# Patient Record
Sex: FEMALE | Race: WHITE | NOT HISPANIC OR LATINO | Employment: UNEMPLOYED | URBAN - METROPOLITAN AREA
[De-identification: names, ages, dates, MRNs, and addresses within clinical notes are randomized per-mention and may not be internally consistent; named-entity substitution may affect disease eponyms.]

---

## 2017-03-03 ENCOUNTER — HOSPITAL ENCOUNTER (EMERGENCY)
Facility: MEDICAL CENTER | Age: 32
End: 2017-03-03
Attending: EMERGENCY MEDICINE
Payer: MEDICAID

## 2017-03-03 VITALS
SYSTOLIC BLOOD PRESSURE: 124 MMHG | DIASTOLIC BLOOD PRESSURE: 80 MMHG | RESPIRATION RATE: 18 BRPM | HEIGHT: 65 IN | BODY MASS INDEX: 38.86 KG/M2 | HEART RATE: 74 BPM | TEMPERATURE: 97.2 F | WEIGHT: 233.25 LBS | OXYGEN SATURATION: 94 %

## 2017-03-03 DIAGNOSIS — F41.1 ANXIETY REACTION: ICD-10-CM

## 2017-03-03 DIAGNOSIS — R20.0 FACIAL NUMBNESS: ICD-10-CM

## 2017-03-03 LAB
ANION GAP SERPL CALC-SCNC: 7 MMOL/L (ref 0–11.9)
BASOPHILS # BLD AUTO: 0.7 % (ref 0–1.8)
BASOPHILS # BLD: 0.05 K/UL (ref 0–0.12)
BUN SERPL-MCNC: 10 MG/DL (ref 8–22)
CALCIUM SERPL-MCNC: 9 MG/DL (ref 8.5–10.5)
CHLORIDE SERPL-SCNC: 103 MMOL/L (ref 96–112)
CO2 SERPL-SCNC: 27 MMOL/L (ref 20–33)
CREAT SERPL-MCNC: 0.82 MG/DL (ref 0.5–1.4)
EOSINOPHIL # BLD AUTO: 0.11 K/UL (ref 0–0.51)
EOSINOPHIL NFR BLD: 1.5 % (ref 0–6.9)
ERYTHROCYTE [DISTWIDTH] IN BLOOD BY AUTOMATED COUNT: 43.1 FL (ref 35.9–50)
GFR SERPL CREATININE-BSD FRML MDRD: >60 ML/MIN/1.73 M 2
GLUCOSE SERPL-MCNC: 97 MG/DL (ref 65–99)
HCT VFR BLD AUTO: 41.9 % (ref 37–47)
HGB BLD-MCNC: 13.8 G/DL (ref 12–16)
IMM GRANULOCYTES # BLD AUTO: 0.01 K/UL (ref 0–0.11)
IMM GRANULOCYTES NFR BLD AUTO: 0.1 % (ref 0–0.9)
LYMPHOCYTES # BLD AUTO: 2.45 K/UL (ref 1–4.8)
LYMPHOCYTES NFR BLD: 33.8 % (ref 22–41)
MCH RBC QN AUTO: 30.2 PG (ref 27–33)
MCHC RBC AUTO-ENTMCNC: 32.9 G/DL (ref 33.6–35)
MCV RBC AUTO: 91.7 FL (ref 81.4–97.8)
MONOCYTES # BLD AUTO: 0.33 K/UL (ref 0–0.85)
MONOCYTES NFR BLD AUTO: 4.6 % (ref 0–13.4)
NEUTROPHILS # BLD AUTO: 4.3 K/UL (ref 2–7.15)
NEUTROPHILS NFR BLD: 59.3 % (ref 44–72)
NRBC # BLD AUTO: 0 K/UL
NRBC BLD AUTO-RTO: 0 /100 WBC
PLATELET # BLD AUTO: 209 K/UL (ref 164–446)
PMV BLD AUTO: 9.1 FL (ref 9–12.9)
POTASSIUM SERPL-SCNC: 3.9 MMOL/L (ref 3.6–5.5)
RBC # BLD AUTO: 4.57 M/UL (ref 4.2–5.4)
SODIUM SERPL-SCNC: 137 MMOL/L (ref 135–145)
WBC # BLD AUTO: 7.3 K/UL (ref 4.8–10.8)

## 2017-03-03 PROCEDURE — 85025 COMPLETE CBC W/AUTO DIFF WBC: CPT

## 2017-03-03 PROCEDURE — 36415 COLL VENOUS BLD VENIPUNCTURE: CPT

## 2017-03-03 PROCEDURE — 80048 BASIC METABOLIC PNL TOTAL CA: CPT

## 2017-03-03 PROCEDURE — 99284 EMERGENCY DEPT VISIT MOD MDM: CPT

## 2017-03-03 ASSESSMENT — PAIN SCALES - GENERAL: PAINLEVEL_OUTOF10: 0

## 2017-03-03 ASSESSMENT — LIFESTYLE VARIABLES: DO YOU DRINK ALCOHOL: NO

## 2017-03-03 NOTE — ED AVS SNAPSHOT
Myntra Access Code: [email or web address]  Expires: 4/2/2017  6:49 PM    Myntra  A secure, online tool to manage your health information     [employer]’s Myntra® is a secure, online tool that connects you to your personalized health information from the privacy of your home -- day or night - making it very easy for you to manage your healthcare. Once the activation process is completed, you can even access your medical information using the Myntra jonnie, which is available for free in the Apple Jonnie store or Google Play store.     Myntra provides the following levels of access (as shown below):   My Chart Features   [facility] Primary Care Doctor [facility]  Specialists [facility]  Urgent  Care Non-[facility]  Primary Care  Doctor   Email your healthcare team securely and privately 24/7 X X X X   Manage appointments: schedule your next appointment; view details of past/upcoming appointments X      Request prescription refills. X      View recent personal medical records, including lab and immunizations X X X X   View health record, including health history, allergies, medications X X X X   Read reports about your outpatient visits, procedures, consult and ER notes X X X X   See your discharge summary, which is a recap of your hospital and/or ER visit that includes your diagnosis, lab results, and care plan. X X       How to register for Myntra:  1. Go to  [email or web address].  2. Click on the Sign Up Now box, which takes you to the New Member Sign Up page. You will need to provide the following information:  a. Enter your Myntra Access Code exactly as it appears at the top of this page. (You will not need to use this code after you’ve completed the sign-up process. If you do not sign up before the expiration date, you must request a new code.)   b. Enter your date of birth.   c. Enter your home email address.   d. Click Submit, and follow the next screen’s instructions.  3. Create a Myntra ID. This will be your Myntra  login ID and cannot be changed, so think of one that is secure and easy to remember.  4. Create a [employer] password. You can change your password at any time.  5. Enter your Password Reset Question and Answer. This can be used at a later time if you forget your password.   6. Enter your e-mail address. This allows you to receive e-mail notifications when new information is available in [employer].  7. Click Sign Up. You can now view your health information.    For assistance activating your [employer] account, call [phone]

## 2017-03-03 NOTE — ED AVS SNAPSHOT
3/3/2017          Yasemin Bradley  [address]  [place] [address]    Dear Yasemin:    [facility] wants to ensure your discharge home is safe and you or your loved ones have had all your questions answered regarding your care after you leave the hospital.    You may receive a telephone call within two days of your discharge.  This call is to make certain you understand your discharge instructions as well as ensure we provided you with the best care possible during your stay with us.     The call will only last approximately 3-5 minutes and will be done by a nurse.    Once again, we want to ensure your discharge home is safe and that you have a clear understanding of any next steps in your care.  If you have any questions or concerns, please do not hesitate to contact us, we are here for you.  Thank you for choosing [facility] for your healthcare needs.    Sincerely,    Amandeep Lockwood    [facility]

## 2017-03-04 NOTE — DISCHARGE INSTRUCTIONS
Return immediately to the Emergency Department if you experience continuing or worsening symptoms or if you develop any new or worsening symptoms including but not limited to fever, chest pain, difficulty breathing, any numbness/weakness/tingling, abdominal pain or any other concerning symptoms.    Normal Exam, Adult  You were seen and examined today in our facility. Our caregiver found nothing wrong on the exam. If testing was done such as lab work or x-rays, they did not indicate enough wrong to suggest that treatment should be given. The caregiver then must decide after testing is finished if your concern is a physical problem or illness that needs treatment. Today no treatable problem was found. Even if reassurance was given, if you feel you are getting worse when you get home make sure you call back or return to our department.  For the protection of your privacy, test results can not be given over the phone. Make sure you receive the results of your test. Ask as to how these results are to be obtained if you have not been informed. It is your responsibility to obtain your test results.  Your condition can change over time. Sometimes it takes more than one visit to determine the cause of your symptoms. It is important that you monitor your condition for any changes.  SEEK IMMEDIATE MEDICAL CARE IF:  · You develop an oral temperature above 102° F (38.9° C), which lasts for more than 2 days, not controlled by medications. Only take over-the-counter or prescription medicines for pain, discomfort, or fever as directed by your caregiver.   · You develop a loss of appetite or start throwing up (vomiting).   · You develop a rash, cough, belly (abdominal) pain, earache, headache, or develop pain in neck, muscles, or joints.   · The problem or problems which brought you to our facility become worse or are a cause of more concern.   If we have told you today your exam and tests are normal, and a short while later you  feel this is not right, please seek medical attention so you may be rechecked.  Document Released: 04/01/2002 Document Revised: 03/11/2013 Document Reviewed: 07/24/2009  [employer]® Patient Information ©2013 [employer], [employer].

## 2017-03-04 NOTE — ED PROVIDER NOTES
"ED Provider Note    CHIEF COMPLAINT  Chief Complaint   Patient presents with   • Other     mouth and throat numbness after drinking enid tea from [employer], then started drinking water and feels better but L side of mouth feels numb       HPI  Yasemin Bradley is a 31 y.o. female who presents for evaluation of resolving mouth and throat \"numbness\" that's described as a burning sensation. She apparently had just began drinking some kind of a enid tea drink from [employer] when the symptoms began. She then stopped at the nearest [employer] and grabbed some water and reports that the symptoms improved significantly. She never had similar symptoms like this. She denies any swelling of her face or tongue. No shortness of breath or cough. No rash. She states that an area of her left cheek has decreased sensation compared to the other side. No other focal weakness or numbness or tingling. No other complaints. She reports that she began to feel anxious after the onset of the symptoms. She also notes that the symptoms were initiated by a metallic taste in her mouth only when she exhaled. The patient admits to vaping but states this never happens when she vapes.     REVIEW OF SYSTEMS  Negative for fever, rash, chest pain, dyspnea, abdominal pain, nausea, vomiting, diarrhea, headache, back pain. All other systems are negative.     PAST MEDICAL HISTORY  Past Medical History   Diagnosis Date   • PVC's (premature ventricular contractions)    • Dysmenorrhea    • ASTHMA    • Ovarian cyst    • Pregnancy    • PID (pelvic inflammatory disease)    • Narcotic abuse        FAMILY HISTORY  No family history on file.    SOCIAL HISTORY  Social History   Substance Use Topics   • Smoking status: Former Smoker -- 0.50 packs/day     Types: Cigarettes   • Smokeless tobacco: Not on file      Comment: quit 4wks ago   • Alcohol Use: No       SURGICAL HISTORY  Past Surgical History   Procedure Laterality Date   • Other        T & A  age 14  " "  • Primary c section  12/19/2010     Performed by CHARU FIELDS at LABOR AND DELIVERY       CURRENT MEDICATIONS  I personally reviewed the medication list in the charting documentation.     ALLERGIES  No Known Allergies    MEDICAL RECORD  I have reviewed patient's medical record and pertinent results are listed above.      PHYSICAL EXAM  VITAL SIGNS: /59 mmHg  Pulse 92  Temp(Src) 36.2 °C (97.2 °F) (Temporal)  Resp 16  Ht 1.651 m (5' 5\")  Wt 105.8 kg (233 lb 4 oz)  BMI 38.81 kg/m2  SpO2 98%   Constitutional: Well appearing patient in no acute distress.  Not toxic, nor ill in appearance.  HENT: Mucus membranes moist.    Eyes: No scleral icterus. Normal conjunctiva   Neck: Supple, comfortable, nonpainful range of motion.   Cardiovascular: Regular heart rate and rhythm.   Thorax & Lungs: Chest is nontender.  Lungs are clear to auscultation with good air movement bilaterally.  No wheeze, rhonchi, nor rales.   Skin: Warm, dry. No rash appreciated  Extremities/Musculoskeletal: No sign of trauma. No asymmetric calf tenderness, erythema or edema. Normal range of motion   Neurologic: AAOx4, Cranial nerves II-XII grossly intact except for subjectively decreased sensation involving a small area over the left cheek, PERRLA, EOMI, speech is normal, normal and symmetric motor and sensory functions upper and lower extremities bilaterally, gait is normal   Psychiatric: Normal affect appropriate for the clinical situation.    DIAGNOSTIC STUDIES / PROCEDURES    LABS  Results for orders placed or performed during the hospital encounter of 03/03/17   CBC WITH DIFFERENTIAL   Result Value Ref Range    WBC 7.3 4.8 - 10.8 K/uL    RBC 4.57 4.20 - 5.40 M/uL    Hemoglobin 13.8 12.0 - 16.0 g/dL    Hematocrit 41.9 37.0 - 47.0 %    MCV 91.7 81.4 - 97.8 fL    MCH 30.2 27.0 - 33.0 pg    MCHC 32.9 (L) 33.6 - 35.0 g/dL    RDW 43.1 35.9 - 50.0 fL    Platelet Count 209 164 - 446 K/uL    MPV 9.1 9.0 - 12.9 fL    Neutrophils-Polys 59.30 " 44.00 - 72.00 %    Lymphocytes 33.80 22.00 - 41.00 %    Monocytes 4.60 0.00 - 13.40 %    Eosinophils 1.50 0.00 - 6.90 %    Basophils 0.70 0.00 - 1.80 %    Immature Granulocytes 0.10 0.00 - 0.90 %    Nucleated RBC 0.00 /100 WBC    Neutrophils (Absolute) 4.30 2.00 - 7.15 K/uL    Lymphs (Absolute) 2.45 1.00 - 4.80 K/uL    Monos (Absolute) 0.33 0.00 - 0.85 K/uL    Eos (Absolute) 0.11 0.00 - 0.51 K/uL    Baso (Absolute) 0.05 0.00 - 0.12 K/uL    Immature Granulocytes (abs) 0.01 0.00 - 0.11 K/uL    NRBC (Absolute) 0.00 K/uL   BASIC METABOLIC PANEL   Result Value Ref Range    Sodium 137 135 - 145 mmol/L    Potassium 3.9 3.6 - 5.5 mmol/L    Chloride 103 96 - 112 mmol/L    Co2 27 20 - 33 mmol/L    Glucose 97 65 - 99 mg/dL    Bun 10 8 - 22 mg/dL    Creatinine 0.82 0.50 - 1.40 mg/dL    Calcium 9.0 8.5 - 10.5 mg/dL    Anion Gap 7.0 0.0 - 11.9   ESTIMATED GFR   Result Value Ref Range    GFR If African American >60 >60 mL/min/1.73 m 2    GFR If Non African American >60 >60 mL/min/1.73 m 2         COURSE & MEDICAL DECISION MAKING  I have reviewed any medical record information, laboratory studies and radiographic results as noted above.    Encounter Summary: This is a 31 y.o. female with a rather bizarre constellation of symptoms that began after drinking a enid tea drink, she had now resolved burning in her mouth and throat followed by decreased sensation over a small non-dermatomal region of her left cheek. No other focal neurologic complaints or findings, this certainly does not represent an acute CVA. The patient does report feeling anxious after this began, perhaps that accounts for some of the symptoms. In any event her exam is otherwise unremarkable, she looks quite well, will check basic blood work and if all is unremarkable she doesn't develop any new symptoms during her observation period here in the emergency department she'll be discharged to follow-up with her primary care provider.      DISPOSITION: Discharge home in  stable condition      FINAL IMPRESSION  1. Facial numbness    2. Anxiety reaction           This dictation was created using voice recognition software. The accuracy of the dictation is limited to the abilities of the software. I expect there may be some errors of grammar and possibly content. The nursing notes were reviewed and certain aspects of this information were incorporated into this note.    Electronically signed by: Rudolph Mosley, 3/3/2017 5:40 PM

## 2017-07-07 ENCOUNTER — APPOINTMENT (OUTPATIENT)
Dept: ADMISSIONS | Facility: MEDICAL CENTER | Age: 32
End: 2017-07-07
Attending: SPECIALIST
Payer: MEDICAID

## 2017-07-07 RX ORDER — OXYCODONE HYDROCHLORIDE 20 MG/1
1 TABLET ORAL EVERY 4 HOURS
COMMUNITY
End: 2019-11-13

## 2017-07-07 RX ORDER — OXYCODONE HCL 20 MG/1
20 TABLET, FILM COATED, EXTENDED RELEASE ORAL EVERY 12 HOURS
COMMUNITY
End: 2019-11-13

## 2017-07-07 RX ORDER — IBUPROFEN 800 MG/1
800 TABLET ORAL DAILY
COMMUNITY
End: 2019-11-13

## 2017-07-07 RX ORDER — GABAPENTIN 300 MG/1
300 CAPSULE ORAL 2 TIMES DAILY
COMMUNITY
End: 2019-11-13

## 2017-07-07 RX ORDER — ONDANSETRON 4 MG/1
4 TABLET, ORALLY DISINTEGRATING ORAL
COMMUNITY
End: 2019-11-13

## 2017-07-07 RX ORDER — ACETAMINOPHEN 500 MG
500-1000 TABLET ORAL
COMMUNITY
End: 2019-11-13

## 2017-07-10 ENCOUNTER — HOSPITAL ENCOUNTER (OUTPATIENT)
Facility: MEDICAL CENTER | Age: 32
End: 2017-07-10
Attending: SPECIALIST | Admitting: SPECIALIST
Payer: MEDICAID

## 2017-07-10 VITALS
DIASTOLIC BLOOD PRESSURE: 69 MMHG | RESPIRATION RATE: 16 BRPM | BODY MASS INDEX: 36.8 KG/M2 | HEIGHT: 65 IN | WEIGHT: 220.9 LBS | OXYGEN SATURATION: 95 % | SYSTOLIC BLOOD PRESSURE: 104 MMHG | TEMPERATURE: 97.9 F | HEART RATE: 59 BPM

## 2017-07-10 PROBLEM — N92.0 EXCESSIVE OR FREQUENT MENSTRUATION: Status: ACTIVE | Noted: 2017-07-10

## 2017-07-10 LAB
B-HCG FREE SERPL-ACNC: <5 MIU/ML
IHCGL IHCGL: NEGATIVE MIU/ML

## 2017-07-10 PROCEDURE — 160002 HCHG RECOVERY MINUTES (STAT): Performed by: SPECIALIST

## 2017-07-10 PROCEDURE — 88305 TISSUE EXAM BY PATHOLOGIST: CPT

## 2017-07-10 PROCEDURE — 500448 HCHG DRESSING, TELFA 3X4: Performed by: SPECIALIST

## 2017-07-10 PROCEDURE — 160009 HCHG ANES TIME/MIN: Performed by: SPECIALIST

## 2017-07-10 PROCEDURE — 501688 HCHG UTERINE MANIPULATOR RUMI: Performed by: SPECIALIST

## 2017-07-10 PROCEDURE — 160041 HCHG SURGERY MINUTES - EA ADDL 1 MIN LEVEL 4: Performed by: SPECIALIST

## 2017-07-10 PROCEDURE — 501577 HCHG TROCAR, STEP 11MM: Performed by: SPECIALIST

## 2017-07-10 PROCEDURE — 700101 HCHG RX REV CODE 250

## 2017-07-10 PROCEDURE — A9270 NON-COVERED ITEM OR SERVICE: HCPCS

## 2017-07-10 PROCEDURE — 501838 HCHG SUTURE GENERAL: Performed by: SPECIALIST

## 2017-07-10 PROCEDURE — 160035 HCHG PACU - 1ST 60 MINS PHASE I: Performed by: SPECIALIST

## 2017-07-10 PROCEDURE — 500886 HCHG PACK, LAPAROSCOPY: Performed by: SPECIALIST

## 2017-07-10 PROCEDURE — 700111 HCHG RX REV CODE 636 W/ 250 OVERRIDE (IP)

## 2017-07-10 PROCEDURE — 501666 HCHG TUBING, HYDRO THERMABLATOR: Performed by: SPECIALIST

## 2017-07-10 PROCEDURE — 502240 HCHG MISC OR SUPPLY RC 0272: Performed by: SPECIALIST

## 2017-07-10 PROCEDURE — 500854 HCHG NEEDLE, INSUFFLATION FOR STEP: Performed by: SPECIALIST

## 2017-07-10 PROCEDURE — 160036 HCHG PACU - EA ADDL 30 MINS PHASE I: Performed by: SPECIALIST

## 2017-07-10 PROCEDURE — 160029 HCHG SURGERY MINUTES - 1ST 30 MINS LEVEL 4: Performed by: SPECIALIST

## 2017-07-10 PROCEDURE — 84702 CHORIONIC GONADOTROPIN TEST: CPT

## 2017-07-10 PROCEDURE — 700102 HCHG RX REV CODE 250 W/ 637 OVERRIDE(OP)

## 2017-07-10 PROCEDURE — 160048 HCHG OR STATISTICAL LEVEL 1-5: Performed by: SPECIALIST

## 2017-07-10 RX ORDER — ONDANSETRON 2 MG/ML
4 INJECTION INTRAMUSCULAR; INTRAVENOUS EVERY 6 HOURS PRN
Status: DISCONTINUED | OUTPATIENT
Start: 2017-07-10 | End: 2017-07-10 | Stop reason: HOSPADM

## 2017-07-10 RX ORDER — KETOROLAC TROMETHAMINE 30 MG/ML
INJECTION, SOLUTION INTRAMUSCULAR; INTRAVENOUS
Status: COMPLETED
Start: 2017-07-10 | End: 2017-07-10

## 2017-07-10 RX ORDER — SODIUM CHLORIDE, SODIUM LACTATE, POTASSIUM CHLORIDE, CALCIUM CHLORIDE 600; 310; 30; 20 MG/100ML; MG/100ML; MG/100ML; MG/100ML
INJECTION, SOLUTION INTRAVENOUS CONTINUOUS
Status: DISCONTINUED | OUTPATIENT
Start: 2017-07-10 | End: 2017-07-10 | Stop reason: HOSPADM

## 2017-07-10 RX ORDER — OXYCODONE HCL 5 MG/5 ML
SOLUTION, ORAL ORAL
Status: COMPLETED
Start: 2017-07-10 | End: 2017-07-10

## 2017-07-10 RX ADMIN — HYDROMORPHONE HYDROCHLORIDE 0.4 MG: 1 INJECTION, SOLUTION INTRAMUSCULAR; INTRAVENOUS; SUBCUTANEOUS at 18:09

## 2017-07-10 RX ADMIN — HYDROMORPHONE HYDROCHLORIDE 0.3 MG: 1 INJECTION, SOLUTION INTRAMUSCULAR; INTRAVENOUS; SUBCUTANEOUS at 18:53

## 2017-07-10 RX ADMIN — OXYCODONE HYDROCHLORIDE 10 MG: 5 SOLUTION ORAL at 17:35

## 2017-07-10 RX ADMIN — KETOROLAC TROMETHAMINE 30 MG: 30 INJECTION, SOLUTION INTRAMUSCULAR at 18:11

## 2017-07-10 RX ADMIN — HYDROMORPHONE HYDROCHLORIDE 0.3 MG: 1 INJECTION, SOLUTION INTRAMUSCULAR; INTRAVENOUS; SUBCUTANEOUS at 18:54

## 2017-07-10 RX ADMIN — FENTANYL CITRATE 50 MCG: 50 INJECTION, SOLUTION INTRAMUSCULAR; INTRAVENOUS at 17:37

## 2017-07-10 ASSESSMENT — PAIN SCALES - GENERAL
PAINLEVEL_OUTOF10: 5
PAINLEVEL_OUTOF10: 8
PAINLEVEL_OUTOF10: 8
PAINLEVEL_OUTOF10: 5

## 2017-07-10 NOTE — H&P
Yasemin Bradley          YOB: 1985  Date of today's surgery: Monday, July 10, 2017  Facility: [facility]    ID: The patient is a very pleasant 32 year old primipara (para-1, and Yasemin has had one previous  section).    Chief Complaint: The patient complains of menorrhagia accompanied by dysmenorrhea.    History of Present Illness: The patient has for at least months had menorrhagia and dysmenorrhea.  She says that she has frequent and at times very heavy vaginal bleeding.  On May 17, 2017 I performed a transvaginal pelvic ultrasound (indication: menorrhagia) in the office using the [employer] ultrasound machine in this ultrasound revealed that the uterus was anteverted and abnormal dimensions and as the uterus was scanned in sagittal and perisagittal as well as transverse planes no evidence of any congenital uterine anomaly was seen and no evidence of uterine fibroids was seen and the endometrial stripe appear to be somewhat smooth and regular and measured 5.91 millimeters in thickness here the cervix appear normal and within the right adnexa was a well-defined round to oval echolucent area consistent with a functional or physiologic cyst of the right ovary which measured 29.9 millimeters by 21.6 millimeters by 31.5 millimeters.  The left ovary cannot be identified.  I discussed with her different options including the option of endometrial ablation.  Of note she tells me that she is definitely decided that she never wants to become pregnant again and that she never wants to have any more children ever again.  She would like for us to proceed with hysteroscopy with hydrothermal endometrial ablation and laparoscopic bilateral tubal ligation.  I have discussed with the patient and explained to the patient in detail and at length what hysteroscopy with hydrothermal endometrial ablation at laparoscopy with laparoscopic bilateral tubal ligation is and what hysteroscopy with  hydrothermal endometrial ablation and laparoscopy with laparoscopic bilateral tubal ligation involves, and I have discussed with her and explained to her in detail and at length the risks and benefits and alternatives of hysteroscopy with hysteroscopic the hydrothermal endometrial ablation at laparoscopy with laparoscopic bilateral tubal ligation.  After our discussions and after answering her questions she told me that she very much wishes for us to proceed with hysteroscopy with hysteroscopic hydrothermal endometrial ablation and laparoscopy with laparoscopic bilateral tubal ligation.    Past Medical History: The patient tells me that she has occasional PVCs and PACs.  She says that otherwise she has no other medical illnesses or conditions.    Past Surgical History: The patient has had a  section.  She has had a laparoscopy.  She has had lumbar surgery and lumbar nerve ablation.    Medications: The patient sees pain management and says that pain management prescribes many of the medications that she takes, including:  oxycodone 20 milligrams, in the patient says that she takes 6 of these per day.  Neurontin, 300 milligrams twice per day.  Zofran 4 milligrams orally as needed.  Ibuprofen 800 milligrams orally as needed.  Tylenol, about 1000 milligrams every other day.    Allergies: The patient says that she has no known drug allergies.    Social History: The patient says that she quit smoking about 3 years ago.  She says that she has not consumed any alcoholic beverages since May 2008.  She says she does not use any recreational drugs except for occasionally smoking marijuana.    Review of Systems  General: The patient denies any fevers, chills, sweats.  She says that she has been suffering from fatigue.  She says she has been unable to lose weight.  She says that she feels that she has no energy.  Pulmonary: The patient denies any coughing, wheezing, chest pain, shortness of breath.  Cardiovascular: The  patient denies any palpitations, dyspnea, chest pain.  Gastrointestinal: The patient says that she does have occasional nausea and she denies any vomiting, diarrhea, constipation, hematochezia, melena, history of hepatitis, history of jaundice.  Genitourinary: The patient complains of menorrhagia and dysmenorrhea  Musculoskeletal: The patient complains of low back pain.   Neurological: No headaches or syncope or seizures.     Physical Exam:   Vital Signs: The patient's vital signs are stable and she is afebrile.  General: The patient appears well developed and well nourished and relaxed and alert and comfortable and in no apparent distress.    HEENT :  Normo-cephalic, atraumatic, pupils equal, round, reactive to light and accommodation, extra ocular motions intact, pharynx clear; there is no thyromegaly. There is no cervical lymphadenopathy.  Chest: Heart regular rate and rhythm, with no murmurs or rubs or gallops; the lungs are clear to auscultation bilaterally.  Abdomen: The abdomen is soft and flat and non-tender and non-distended. There is no hepatomegaly. There is no splenomegaly.  There is a Pfannenstiel scar.  Pelvic: Speculum exam reveals no vulvar or vaginal or cervical lesions and no cervical or vaginal discharge for the vulva and vaginal mucosa appear well estrogenized.  Bimanual exam reveals that the uterus is anteverted and reveals no evidence of cervical motion tenderness and no evidence of uterine enlargement and no evidence of any tenderness to palpation of the uterine corpus and no evidence of any adnexal masses or tenderness either on the right or the left.  Extremities: No clubbing or cyanosis or edema.   Neurological: non-focal.     Assessment:   1.)  Menorrhagia (menometrorrhagia).  2.)  Dysmenorrhea  3.)  The patient has decided that she never wishes to become pregnant again and that she wishes to not have any more children.    Plan:   We will proceed with hysteroscopy with hysteroscopic  hydrothermal endometrial ablation, along with laparoscopy with laparoscopic bilateral tubal ligation.  I have discussed with the patient and explained to the patient in detail and at length what hysteroscopy with hysteroscopic hydrothermal endometrial ablation, along with laparoscopy with laparoscopic bilateral tubal ligation is and what hysteroscopy with hysteroscopic hydrothermal endometrial ablation, along with laparoscopy with laparoscopic bilateral tubal ligation involves, and I have discussed with her and explained to her in detail and at length the risks and benefits and alternatives of hysteroscopy with hysteroscopic hydrothermal endometrial ablation, along with laparoscopy with laparoscopic bilateral tubal ligation, and after our discussions and after answering her questions she told me that she very much wishes for us to proceed with hysteroscopy with hysteroscopic hydrothermal endometrial ablation, along with laparoscopy with laparoscopic bilateral tubal ligation.            ________________________  Jericho Du M.D.

## 2017-07-10 NOTE — OR NURSING
Patient allergies and NPO status verified, home medication reconciliation completed and belongings secured. Patient verbalizes understanding of pain scale, expected course of stay and plan of care. Surgical site verified with patient. . IV access established.   Nara Peraza

## 2017-07-10 NOTE — IP AVS SNAPSHOT
" Home Care Instructions                                                                                                                Name:Yasemin Bradley  Medical Record Number:[id number]  CSN: [id number]    YOB: 1985   Age: 32 y.o.  Sex: female  HT:1.651 m (5' 5\") WT: 100.2 kg (220 lb 14.4 oz)          Admit Date: 7/10/2017     Discharge Date:   Today's Date: 7/10/2017  Attending Doctor:  Jericho Du M.D.                  Allergies:  Review of patient's allergies indicates no known allergies.                Discharge Instructions         ACTIVITY: Rest and take it easy for the first 24 hours.  A responsible adult is recommended to remain with you during that time.  It is normal to feel sleepy.  We encourage you to not do anything that requires balance, judgment or coordination.    MILD FLU-LIKE SYMPTOMS ARE NORMAL. YOU MAY EXPERIENCE GENERALIZED MUSCLE ACHES, THROAT IRRITATION, HEADACHE AND/OR SOME NAUSEA.    FOR 24 HOURS DO NOT:  Drive, operate machinery or run household appliances.  Drink beer or alcoholic beverages.   Make important decisions or sign legal documents.    SPECIAL INSTRUCTIONS: *See Attached Instruction Sheet regarding Hysteroscopy    DIET: To avoid nausea, slowly advance diet as tolerated, avoiding spicy or greasy foods for the first day.  Add more substantial food to your diet according to your physician's instructions.  Babies can be fed formula or breast milk as soon as they are hungry.  INCREASE FLUIDS AND FIBER TO AVOID CONSTIPATION.    SURGICAL DRESSING/BATHING: May shower tomorrow, but no hot tubs, baths or swimming until approved by your physician.      FOLLOW-UP APPOINTMENT:  A follow-up appointment should be arranged with your doctor, call to schedule.    You should CALL YOUR PHYSICIAN if you develop:  Fever greater than 101 degrees F.  Pain not relieved by medication, or persistent nausea or vomiting.  Excessive bleeding (blood soaking through dressing) or unexpected " drainage from the wound.  Extreme redness or swelling around the incision site, drainage of pus or foul smelling drainage.  Inability to urinate or empty your bladder within 8 hours.  Problems with breathing or chest pain.    You should call 911 if you develop problems with breathing or chest pain.  If you are unable to contact your doctor or surgical center, you should go to the nearest emergency room or urgent care center.  Physician's telephone #: *Dr Du [phone]    If any questions arise, call your doctor.  If your doctor is not available, please feel free to call the Surgical Center at [phone].  The Center is open Monday through Friday from 7AM to 7PM.  You can also call the [employer] HOTLINE open 24 hours/day, 7 days/week and speak to a nurse at [phone], or toll free at [phone].    A registered nurse may call you a few days after your surgery to see how you are doing after your procedure.    MEDICATIONS: Resume taking daily medication.  Take prescribed pain medication with food.  If no medication is prescribed, you may take non-aspirin pain medication if needed.  PAIN MEDICATION CAN BE VERY CONSTIPATING.  Take a stool softener or laxative such as senokot, pericolace, or milk of magnesia if needed.    Prescription given for *Has at home*.  Last pain medication given at 5:30 Pm, next dose due at 9:30 Pm.      If your physician has prescribed pain medication that includes Acetaminophen (Tylenol), do not take additional Acetaminophen (Tylenol) while taking the prescribed medication.    Depression / Suicide Risk    As you are discharged from this [facility] facility, it is important to learn how to keep safe from harming yourself.    Recognize the warning signs:  · Abrupt changes in personality, positive or negative- including increase in energy   · Giving away possessions  · Change in eating patterns- significant weight changes-  positive or negative  · Change in sleeping patterns- unable  to sleep or sleeping all the time   · Unwillingness or inability to communicate  · Depression  · Unusual sadness, discouragement and loneliness  · Talk of wanting to die  · Neglect of personal appearance   · Rebelliousness- reckless behavior  · Withdrawal from people/activities they love  · Confusion- inability to concentrate     If you or a loved one observes any of these behaviors or has concerns about self-harm, here's what you can do:  · Talk about it- your feelings and reasons for harming yourself  · Remove any means that you might use to hurt yourself (examples: pills, rope, extension cords, firearm)  · Get professional help from the community (Mental Health, Substance Abuse, psychological counseling)  · Do not be alone:Call your Safe Contact- someone whom you trust who will be there for you.  · Call your local CRISIS HOTLINE 856-6990 or 316-237-7088  · Call your local Children's Mobile Crisis Response Team [place] (642) 871-4854 or www.OfferSavvy  · Call the toll free National Suicide Prevention Hotlines   · National Suicide Prevention Lifeline 176-843-KNBH (0613)  · National Hope Line Network 800-SUICIDE (015-9636)       Medication List      CONTINUE taking these medications        Instructions    Morning Afternoon Evening Bedtime    acetaminophen 500 MG Tabs   Commonly known as:  TYLENOL        Take 500-1,000 mg by mouth 1 time daily as needed.   Dose:  500-1000 mg                        gabapentin 300 MG Caps   Commonly known as:  NEURONTIN        Take 300 mg by mouth 2 Times a Day.   Dose:  300 mg                        ibuprofen 800 MG Tabs   Commonly known as:  MOTRIN        Take 800 mg by mouth every day.   Dose:  800 mg                        MIRALAX PO        Take  by mouth every day.                        ondansetron 4 MG Tbdp   Commonly known as:  ZOFRAN ODT        Take 4 mg by mouth 1 time daily as needed.   Dose:  4 mg                        * oxyCODONE CR 20 MG T12a   Commonly known  as:  OXYCONTIN        Take 20 mg by mouth every 12 hours.   Dose:  20 mg                        * Oxycodone HCl 20 MG Tabs        Take 1 Tab by mouth every 4 hours.   Dose:  1 Tab                        SENOKOT PO        Take  by mouth. Uses a special senna product daily or as needed                        * Notice:  This list has 2 medication(s) that are the same as other medications prescribed for you. Read the directions carefully, and ask your doctor or other care provider to review them with you.            Medication Information     Above and/or attached are the medications your physician expects you to take upon discharge. Review all of your home medications and newly ordered medications with your doctor and/or pharmacist. Follow medication instructions as directed by your doctor and/or pharmacist. Please keep your medication list with you and share with your physician. Update the information when medications are discontinued, doses are changed, or new medications (including over-the-counter products) are added; and carry medication information at all times in the event of emergency situations.        Resources     Quit Smoking / Tobacco Use:    I understand the use of any tobacco products increases my chance of suffering from future heart disease or stroke and could cause other illnesses which may shorten my life. Quitting the use of tobacco products is the single most important thing I can do to improve my health. For further information on smoking / tobacco cessation call a Toll Free Quit Line at 1-331.181.7483 (*[employer] [place]) or 1-179.651.6676 (American Lung Association) or you can access the web based program at www.lungusa.org.    [place] Tobacco Users Help Line:  (336) 347-8430       Toll Free: 1-318.575.4218  Quit Tobacco Program [facility] ([phone]    Crisis Hotline:    National Crisis Hotline:  5-592-OGSQMNP or 1-623.204.7820    [place] Crisis Hotline:     9-045-808-8433 or 131-583-8932    Discharge Survey:   Thank you for choosing [facility]. We hope we did everything we could to make your hospital stay a pleasant one. You may be receiving a survey and we would appreciate your time and participation in answering the questions. Your input is very valuable to us in our efforts to improve our service to our patients and their families.            Signatures     My signature on this form indicates that:    1. I acknowledge receipt and understanding of these Home Care Instruction.  2. My questions regarding this information have been answered to my satisfaction.  3. I have formulated a plan with my discharge nurse to obtain my prescribed medications for home.    __________________________________      __________________________________                   Patient Signature                                 Guardian/Responsible Adult Signature      __________________________________                 __________       ________                       Nurse Signature                                               Date                 Time

## 2017-07-10 NOTE — IP AVS SNAPSHOT
7/10/2017    Yasemin Michaela Mirna  [address]  [place] [address]    Dear Yasemin:    [facility] wants to ensure your discharge home is safe and you or your loved ones have had all of your questions answered regarding your care after you leave the hospital.    Below is a list of resources and contact information should you have any questions regarding your hospital stay, follow-up instructions, or active medical symptoms.    Questions or Concerns Regarding… Contact   Medical Questions Related to Your Discharge  (7 days a week, 8am-5pm) Contact a Nurse Care Coordinator   [phone]   Medical Questions Not Related to Your Discharge  (24 hours a day / 7 days a week)  Contact the Nurse Health Line   [phone]    Medications or Discharge Instructions Refer to your discharge packet   or contact your [facility] Primary Care Provider   [phone]   Follow-up Appointment(s) Schedule your appointment via Storyvine   or contact Scheduling [phone]   Billing Review your statement via [employer]  or contact Billing [phone]   Medical Records Review your records via [employer]vine   or contact Medical Records [phone]     You may receive a telephone call within two days of discharge. This call is to make certain you understand your discharge instructions and have the opportunity to have any questions answered. You can also easily access your medical information, test results and upcoming appointments via the [employer]vine free online health management tool. You can learn more and sign up at [employer]/Storyvine. For assistance setting up your Storyvine account, please call [phone].    Once again, we want to ensure your discharge home is safe and that you have a clear understanding of any next steps in your care. If you have any questions or concerns, please do not hesitate to contact us, we are here for you. Thank you for choosing [facility] for your healthcare needs.    Sincerely,    Your [facility] Healthcare Team

## 2017-07-11 NOTE — OR NURSING
1723 Received from OR.  Abdomen soft, band aid CDI.  Masha pad dry.  Oral air way in place with equal breath sounds noted      1735 Iv pain medication given see mar. 8/10    1737 Oral pain medication given see mar.  8/10    1809 Iv pain medication given    1811 Toradol     1853 iv pain medication given see mar.      1915 Pt voided with out difficulty.  Minimal bleeding noted on masha pad..  Steady gait.     1940 Called Dr. Du Pt c/o UTI and Dr. Du called in a RX to [facility]    1945 Dc to car via [employer].  Pt has all belongings.

## 2017-07-11 NOTE — DISCHARGE INSTRUCTIONS
ACTIVITY: Rest and take it easy for the first 24 hours.  A responsible adult is recommended to remain with you during that time.  It is normal to feel sleepy.  We encourage you to not do anything that requires balance, judgment or coordination.    MILD FLU-LIKE SYMPTOMS ARE NORMAL. YOU MAY EXPERIENCE GENERALIZED MUSCLE ACHES, THROAT IRRITATION, HEADACHE AND/OR SOME NAUSEA.    FOR 24 HOURS DO NOT:  Drive, operate machinery or run household appliances.  Drink beer or alcoholic beverages.   Make important decisions or sign legal documents.    SPECIAL INSTRUCTIONS: *See Attached Instruction Sheet regarding Hysteroscopy    DIET: To avoid nausea, slowly advance diet as tolerated, avoiding spicy or greasy foods for the first day.  Add more substantial food to your diet according to your physician's instructions.  Babies can be fed formula or breast milk as soon as they are hungry.  INCREASE FLUIDS AND FIBER TO AVOID CONSTIPATION.    SURGICAL DRESSING/BATHING: May shower tomorrow, but no hot tubs, baths or swimming until approved by your physician.      FOLLOW-UP APPOINTMENT:  A follow-up appointment should be arranged with your doctor, call to schedule.    You should CALL YOUR PHYSICIAN if you develop:  Fever greater than 101 degrees F.  Pain not relieved by medication, or persistent nausea or vomiting.  Excessive bleeding (blood soaking through dressing) or unexpected drainage from the wound.  Extreme redness or swelling around the incision site, drainage of pus or foul smelling drainage.  Inability to urinate or empty your bladder within 8 hours.  Problems with breathing or chest pain.    You should call 911 if you develop problems with breathing or chest pain.  If you are unable to contact your doctor or surgical center, you should go to the nearest emergency room or urgent care center.  Physician's telephone #: *Dr Du [phone]    If any questions arise, call your doctor.  If your doctor is not available, please  feel free to call the Surgical Center at [phone].  The Center is open Monday through Friday from 7AM to 7PM.  You can also call the HEALTH HOTLINE open 24 hours/day, 7 days/week and speak to a nurse at [phone], or toll free at [phone].    A registered nurse may call you a few days after your surgery to see how you are doing after your procedure.    MEDICATIONS: Resume taking daily medication.  Take prescribed pain medication with food.  If no medication is prescribed, you may take non-aspirin pain medication if needed.  PAIN MEDICATION CAN BE VERY CONSTIPATING.  Take a stool softener or laxative such as senokot, pericolace, or milk of magnesia if needed.    Prescription given for *Has at home*.  Last pain medication given at 5:30 Pm, next dose due at 9:30 Pm.      If your physician has prescribed pain medication that includes Acetaminophen (Tylenol), do not take additional Acetaminophen (Tylenol) while taking the prescribed medication.    Depression / Suicide Risk    As you are discharged from this [facility] facility, it is important to learn how to keep safe from harming yourself.    Recognize the warning signs:  · Abrupt changes in personality, positive or negative- including increase in energy   · Giving away possessions  · Change in eating patterns- significant weight changes-  positive or negative  · Change in sleeping patterns- unable to sleep or sleeping all the time   · Unwillingness or inability to communicate  · Depression  · Unusual sadness, discouragement and loneliness  · Talk of wanting to die  · Neglect of personal appearance   · Rebelliousness- reckless behavior  · Withdrawal from people/activities they love  · Confusion- inability to concentrate     If you or a loved one observes any of these behaviors or has concerns about self-harm, here's what you can do:  · Talk about it- your feelings and reasons for harming yourself  · Remove any means that you might use to hurt  yourself (examples: pills, rope, extension cords, firearm)  · Get professional help from the community (Mental Health, Substance Abuse, psychological counseling)  · Do not be alone:Call your Safe Contact- someone whom you trust who will be there for you.  · Call your local CRISIS HOTLINE 749-6997 or 106-105-9321  · Call your local Children's Mobile Crisis Response Team [place] (115) 931-7811 or www.LearnBoost  · Call the toll free National Suicide Prevention Hotlines   · National Suicide Prevention Lifeline 337-503-FJBY (3669)  · National Hope Line Network 800-SUICIDE (738-4694)

## 2017-07-11 NOTE — OR SURGEON
Immediate Post-Operative Note      PreOp Diagnosis:   1.)  Menorrhagia (menometrorrhagia).  2.)  Dysmenorrhea  3.)  The patient has decided that she never wishes to become pregnant again and that she wishes to not have any more children.    PostOp Diagnosis:   1.)  Menorrhagia (menometrorrhagia).  2.)  Dysmenorrhea  3.)  The patient has decided that she never wishes to become pregnant again and that she wishes to not have any more children.    Procedure(s):  HYSTEROSCOPY THERMAL ABLATION endometrial  - Wound Class: Clean Contaminated  TUBAL COAGULATION LAPAROSCOPIC BILATERAL - Wound Class: Clean Contaminated  DILATION AND CURETTAGE - Wound Class: Clean Contaminated    Surgeon(s):  Jericho Du M.D.    Anesthesiologist/Type of Anesthesia:  Anesthesiologist: Jamie Armas M.D./General    Surgical Staff:  Circulator: Teresa Chen R.N.  Relief Scrub: Felicia Zendejas  Scrub Person: Arthur Barrett    Specimen:   Endometrial curetting    Estimated Blood Loss:   Less than 30 cc's     Findings:   There is a small left ovarian cyst which when incised and drained yields clear to straw colored fluid.   Otherwise both ovaries are normal.   Both Fallopian tubes are normal.   The uterus appears normal.   At hysteroscopy excellent views of the intrauterine cavity are obtained and during hysteroscopy no evidence of endometrial polyps is seen and no evidence of submucosal fibroids is seen and no evidence of any congenital uterine anomaly is seen.   When hysteroscopy is continued following endometrial ablation the entire intrauterine cavity is found to be nicely and thoroughly ablated.     Complications:   None.         7/10/2017 5:25 PM Jericho Du

## 2017-07-28 NOTE — OP REPORT
Dictated Postoperative Note    Date of procedure:    Monday, July 10, 2017    Preoperative diagnosis:   1.)  Menorrhagia (menometrorrhagia).  2.)  Dysmenorrhea  3.)  The patient has decided that she never wishes to become pregnant again and that she wishes to not have any more children.    Postoperative diagnosis:  1.)  Menorrhagia (menometrorrhagia).  2.)  Dysmenorrhea  3.)  The patient has decided that she never wishes to become pregnant again and that she wishes to not have any more children.    Procedure:  1.) Laparoscopy with laparoscopic bilateral tubal ligation.  2.) Hysteroscopy, D&C, and hydrothermal endometrial ablation.    Surgeon:  Jericho Du M.D.    Anesthesia:  General anesthesia (general endotracheal tube anesthesia).    Anesthesiologist:   Jamie Armas M.D.    Findings:  At laparoscopy a small left ovarian cyst is seen and when this small left ovarian cyst is incised and drained clear to straw-colored fluid is seen.  Otherwise both ovaries are normal.  Both fallopian tubes are normal.  At laparoscopy the uterus appears normal.  At hysteroscopy excellent views of the entire intrauterine cavity are obtained, and during hysteroscopy no evidence of endometrial polyps is seen and no evidence of any submucosal fibroids is seen and no evidence of any congenital uterine anomaly is seen.  When hysteroscopy is continued following hydrothermal endometrial ablation, the entire intrauterine cavity is well visualized and found to be nicely and thoroughly ablated.    Specimens:   Endometrial curettings    Complications:   None    Estimated blood loss:  Less than 30 mL    Description of procedure:   After the appropriate consents had been obtained the patient is taken to the operating room and given general anesthesia. She was prepped and draped in the dorsal lithotomy position and the bladder was emptied with a catheter. A speculum exam is performed and reveals that there are no vulvar or vaginal or  cervical lesions. The cervix is well visualized. The cervix appears to be somewhat nulliparous. The anterior aspect of the cervix is grasped with a single-tooth tenaculum. The cervix was dilated with Hanks and then Heger dilators. The uterus is sounded to approximately 8 cm. An 8 cm Sri uterine manipulator was then advanced through the endocervical canal into the intrauterine cavity, and the balloon at the tip of the Sri uterine manipulator is inflated with a few cc of water. The single-tooth tenaculum was removed from the cervix. A gauze sponges then placed in the vaginal vault posterior to the Sri uterine manipulator and left in place as the speculum was then removed. The 's gloves are then changed. Next, attention is directed to the abdomen where a small (approximately 1-1-1/2 cm) horizontal infraumbilical incision is made with a scalpel. A Veress needle was advanced through this incision into the peritoneal cavity, and proper placement into the peritoneal cavity is then verified with the Dooley hanging drop technique. The peritoneal cavity is then insufflated with approximately 2-3 L of carbon dioxide gas. The Veress needle is removed and a 10-12 mm port was then introduced through the infraumbilical incision into the peritoneal cavity utilizing the VersaStep trocar system. The central portion of this port is removed and the 10 mm 0° laparoscope was inserted through the remaining sleeve and proper entry into the peritoneal cavity is verified visually with the laparoscope. The patient was placed in Trendelenburg position. A 5 mm port was placed suprapubically under direct laparoscopic visualization utilizing the VersaStep trocar system. The uterus is mobilized with the uterine manipulator and findings are as noted above and pictures are taken. The Kleppinger bipolar forceps were placed through the suprapubic port and used to grasp the right fallopian tube. The right fallopian tube was clearly  identified and followed to its distal fimbriated end. A picture of the right fallopian tube is taken. After the distal fimbriated end of the right fallopian tube had been clearly identified a portion of the right fallopian tube in the mid ampullary region is grasped with a Kleppinger bipolar forceps and thoroughly cauterized, and thorough cauterization is assured with the use of the acoustic ammeter. Adjacent areas of this region of the right fallopian tube were also similarly thoroughly cauterized, again with thorough cauterization assured each time with the use of the acoustic ammeter. A picture of the right fallopian tube was then taken. Next attention is directed to the left fallopian tube. The left fallopian tube was clearly identified and followed to its distal fimbriated end and a picture of the left fallopian tube was then taken. After the distal fimbriated end of the left fallopian tube and the clearly identified the left fallopian tube is grasped in its mid-ampullary region with the Kleppinger bipolar forceps, and this area of the left fallopian tube is thoroughly cauterized and thorough cauterization is assured with the use of the acoustic ammeter. Adjacent areas of this region of the left fallopian tube are also similarly thoroughly cauterized, again with thorough cauterization assured each time with the use of the acoustic ammeter. A picture of the left fallopian tube was then taken. The patient is then taken out of Trendelenburg position. There is allowed to evacuate the peritoneal cavity as the laparoscope was removed and then both ports are removed. The fascia underneath the infraumbilical incision is identified with the use of S retractors and reapproximated with a placement of a simple interrupted suture using Vicryl. Skin incisions were reapproximated with the placement of multiple interrupted buried sutures of 4-0 Monocryl placed in the dermis.  The patient is placed in lithotomy position using  the yellow fin stirrups. The Sri uterine manipulator is removed and the vaginal gauze sponges removed. A speculum exam is performed and the cervix is visualized. The anterior aspect of the cervix is grasped with a single-tooth tenaculum and the cervix is again dilated to a Heger #8. The hysteroscope was advanced through the endocervical canal into the intrauterine cavity and hysteroscopy is performed. The entire intrauterine cavity was well visualized. Pictures were taken. The hysteroscope was removed. A sharp curette is introduced into the intrauterine cavity and the entire intrauterine cavity is curetted and curettings are submitted on a Telfa pad. The sharp curette is removed and the hysteroscope was reinserted through the endocervical canal into the intrauterine cavity and hysteroscopy is repeated. Next, hydrothermal endometrial ablation is performed. After the usual warmup period of 90 seconds, hydrothermal endometrial ablation is performed and continued for 10 minutes. Then, after the usual 90 second cooldown period, hysteroscopy is continued and pictures are taken. The hysteroscope was removed. The single-tooth tenaculum was removed from the cervix. Pressure was placed against the cervix with a gauze sponge. The cervix is examined and some bleeding is seen coming from the site of attachment of the single-tooth tenaculum and this is controlled with the application of silver nitrate. The cervix and then examined and this time no bleeding was seen coming from the cervix, either from the site of attachment of the single-tooth tenaculum or from the external cervical os. The speculum is then removed. The procedure is terminated with final lap and needle counts reported to be correct ×2 at the end of the procedure. The patient tolerated the procedure well and is then sent to post anesthesia recovery in stable condition.  It should be noted that this dictation was created with the use of voice recognition software  (Dragon naturally speaking). Although efforts have been made to assure accuracy, it is still possible that there is good have been made with voice recognition. It should also be noted that this dictation was composed with the use of voice recognition software because the hospital dictation system is still down.    ________________________  Jericho Du M.D.

## 2019-11-13 ENCOUNTER — HOSPITAL ENCOUNTER (OUTPATIENT)
Facility: MEDICAL CENTER | Age: 34
End: 2019-11-14
Attending: EMERGENCY MEDICINE | Admitting: HOSPITALIST
Payer: MEDICAID

## 2019-11-13 ENCOUNTER — APPOINTMENT (OUTPATIENT)
Dept: RADIOLOGY | Facility: MEDICAL CENTER | Age: 34
End: 2019-11-13
Attending: EMERGENCY MEDICINE
Payer: MEDICAID

## 2019-11-13 DIAGNOSIS — K52.9 COLITIS, ACUTE: ICD-10-CM

## 2019-11-13 LAB
ALBUMIN SERPL BCP-MCNC: 4.5 G/DL (ref 3.2–4.9)
ALBUMIN/GLOB SERPL: 1.4 G/DL
ALP SERPL-CCNC: 65 U/L (ref 30–99)
ALT SERPL-CCNC: 15 U/L (ref 2–50)
ANION GAP SERPL CALC-SCNC: 15 MMOL/L (ref 0–11.9)
APPEARANCE UR: CLEAR
AST SERPL-CCNC: 17 U/L (ref 12–45)
BASOPHILS # BLD AUTO: 0.4 % (ref 0–1.8)
BASOPHILS # BLD: 0.05 K/UL (ref 0–0.12)
BILIRUB SERPL-MCNC: 0.9 MG/DL (ref 0.1–1.5)
BILIRUB UR QL STRIP.AUTO: NEGATIVE
BUN SERPL-MCNC: 11 MG/DL (ref 8–22)
CALCIUM SERPL-MCNC: 9.8 MG/DL (ref 8.4–10.2)
CHLORIDE SERPL-SCNC: 102 MMOL/L (ref 96–112)
CO2 SERPL-SCNC: 21 MMOL/L (ref 20–33)
COLOR UR: YELLOW
CREAT SERPL-MCNC: 0.62 MG/DL (ref 0.5–1.4)
EOSINOPHIL # BLD AUTO: 0.07 K/UL (ref 0–0.51)
EOSINOPHIL NFR BLD: 0.6 % (ref 0–6.9)
ERYTHROCYTE [DISTWIDTH] IN BLOOD BY AUTOMATED COUNT: 42.2 FL (ref 35.9–50)
GLOBULIN SER CALC-MCNC: 3.2 G/DL (ref 1.9–3.5)
GLUCOSE SERPL-MCNC: 94 MG/DL (ref 65–99)
GLUCOSE UR STRIP.AUTO-MCNC: NEGATIVE MG/DL
HCG SERPL QL: NEGATIVE
HCT VFR BLD AUTO: 47.3 % (ref 37–47)
HGB BLD-MCNC: 16 G/DL (ref 12–16)
IMM GRANULOCYTES # BLD AUTO: 0.05 K/UL (ref 0–0.11)
IMM GRANULOCYTES NFR BLD AUTO: 0.4 % (ref 0–0.9)
KETONES UR STRIP.AUTO-MCNC: NEGATIVE MG/DL
LACTATE BLD-SCNC: 1 MMOL/L (ref 0.5–2)
LEUKOCYTE ESTERASE UR QL STRIP.AUTO: NEGATIVE
LYMPHOCYTES # BLD AUTO: 1.92 K/UL (ref 1–4.8)
LYMPHOCYTES NFR BLD: 15.5 % (ref 22–41)
MCH RBC QN AUTO: 30.5 PG (ref 27–33)
MCHC RBC AUTO-ENTMCNC: 33.8 G/DL (ref 33.6–35)
MCV RBC AUTO: 90.3 FL (ref 81.4–97.8)
MICRO URNS: NORMAL
MONOCYTES # BLD AUTO: 0.54 K/UL (ref 0–0.85)
MONOCYTES NFR BLD AUTO: 4.4 % (ref 0–13.4)
NEUTROPHILS # BLD AUTO: 9.78 K/UL (ref 2–7.15)
NEUTROPHILS NFR BLD: 78.7 % (ref 44–72)
NITRITE UR QL STRIP.AUTO: NEGATIVE
NRBC # BLD AUTO: 0 K/UL
NRBC BLD-RTO: 0 /100 WBC
PH UR STRIP.AUTO: 6.5 [PH] (ref 5–8)
PLATELET # BLD AUTO: 226 K/UL (ref 164–446)
PMV BLD AUTO: 9.4 FL (ref 9–12.9)
POTASSIUM SERPL-SCNC: 4 MMOL/L (ref 3.6–5.5)
PROT SERPL-MCNC: 7.7 G/DL (ref 6–8.2)
PROT UR QL STRIP: NEGATIVE MG/DL
RBC # BLD AUTO: 5.24 M/UL (ref 4.2–5.4)
RBC UR QL AUTO: NEGATIVE
SODIUM SERPL-SCNC: 138 MMOL/L (ref 135–145)
SP GR UR STRIP.AUTO: 1.01
WBC # BLD AUTO: 12.4 K/UL (ref 4.8–10.8)

## 2019-11-13 PROCEDURE — 700117 HCHG RX CONTRAST REV CODE 255: Performed by: EMERGENCY MEDICINE

## 2019-11-13 PROCEDURE — 84703 CHORIONIC GONADOTROPIN ASSAY: CPT

## 2019-11-13 PROCEDURE — 74177 CT ABD & PELVIS W/CONTRAST: CPT

## 2019-11-13 PROCEDURE — 700105 HCHG RX REV CODE 258: Performed by: EMERGENCY MEDICINE

## 2019-11-13 PROCEDURE — A9270 NON-COVERED ITEM OR SERVICE: HCPCS | Performed by: EMERGENCY MEDICINE

## 2019-11-13 PROCEDURE — 700111 HCHG RX REV CODE 636 W/ 250 OVERRIDE (IP): Performed by: EMERGENCY MEDICINE

## 2019-11-13 PROCEDURE — G0378 HOSPITAL OBSERVATION PER HR: HCPCS

## 2019-11-13 PROCEDURE — 85025 COMPLETE CBC W/AUTO DIFF WBC: CPT

## 2019-11-13 PROCEDURE — 36415 COLL VENOUS BLD VENIPUNCTURE: CPT

## 2019-11-13 PROCEDURE — 700102 HCHG RX REV CODE 250 W/ 637 OVERRIDE(OP): Performed by: EMERGENCY MEDICINE

## 2019-11-13 PROCEDURE — 96375 TX/PRO/DX INJ NEW DRUG ADDON: CPT

## 2019-11-13 PROCEDURE — A9270 NON-COVERED ITEM OR SERVICE: HCPCS | Performed by: HOSPITALIST

## 2019-11-13 PROCEDURE — 700111 HCHG RX REV CODE 636 W/ 250 OVERRIDE (IP): Performed by: HOSPITALIST

## 2019-11-13 PROCEDURE — 96365 THER/PROPH/DIAG IV INF INIT: CPT | Mod: XU

## 2019-11-13 PROCEDURE — 96376 TX/PRO/DX INJ SAME DRUG ADON: CPT

## 2019-11-13 PROCEDURE — 81003 URINALYSIS AUTO W/O SCOPE: CPT

## 2019-11-13 PROCEDURE — 83605 ASSAY OF LACTIC ACID: CPT

## 2019-11-13 PROCEDURE — 700105 HCHG RX REV CODE 258: Performed by: HOSPITALIST

## 2019-11-13 PROCEDURE — 99219 PR INITIAL OBSERVATION CARE,LEVL II: CPT | Performed by: HOSPITALIST

## 2019-11-13 PROCEDURE — 99285 EMERGENCY DEPT VISIT HI MDM: CPT

## 2019-11-13 PROCEDURE — 700102 HCHG RX REV CODE 250 W/ 637 OVERRIDE(OP): Performed by: HOSPITALIST

## 2019-11-13 PROCEDURE — 80053 COMPREHEN METABOLIC PANEL: CPT

## 2019-11-13 RX ORDER — SODIUM CHLORIDE 9 MG/ML
1000 INJECTION, SOLUTION INTRAVENOUS ONCE
Status: COMPLETED | OUTPATIENT
Start: 2019-11-13 | End: 2019-11-13

## 2019-11-13 RX ORDER — POLYETHYLENE GLYCOL 3350 17 G/17G
1 POWDER, FOR SOLUTION ORAL
Status: DISCONTINUED | OUTPATIENT
Start: 2019-11-13 | End: 2019-11-14 | Stop reason: HOSPADM

## 2019-11-13 RX ORDER — PROMETHAZINE HYDROCHLORIDE 25 MG/1
12.5-25 SUPPOSITORY RECTAL EVERY 4 HOURS PRN
Status: DISCONTINUED | OUTPATIENT
Start: 2019-11-13 | End: 2019-11-14 | Stop reason: HOSPADM

## 2019-11-13 RX ORDER — ONDANSETRON 4 MG/1
4 TABLET, ORALLY DISINTEGRATING ORAL EVERY 4 HOURS PRN
Status: DISCONTINUED | OUTPATIENT
Start: 2019-11-13 | End: 2019-11-14 | Stop reason: HOSPADM

## 2019-11-13 RX ORDER — OXYCODONE HYDROCHLORIDE 5 MG/1
5 TABLET ORAL
Status: DISCONTINUED | OUTPATIENT
Start: 2019-11-13 | End: 2019-11-14 | Stop reason: HOSPADM

## 2019-11-13 RX ORDER — IBUPROFEN 200 MG
800 TABLET ORAL EVERY 6 HOURS PRN
COMMUNITY
End: 2020-07-09

## 2019-11-13 RX ORDER — MORPHINE SULFATE 4 MG/ML
4 INJECTION, SOLUTION INTRAMUSCULAR; INTRAVENOUS ONCE
Status: COMPLETED | OUTPATIENT
Start: 2019-11-13 | End: 2019-11-13

## 2019-11-13 RX ORDER — ACETAMINOPHEN 325 MG/1
650 TABLET ORAL EVERY 6 HOURS PRN
Status: DISCONTINUED | OUTPATIENT
Start: 2019-11-13 | End: 2019-11-14 | Stop reason: HOSPADM

## 2019-11-13 RX ORDER — HYDROMORPHONE HYDROCHLORIDE 1 MG/ML
0.5 INJECTION, SOLUTION INTRAMUSCULAR; INTRAVENOUS; SUBCUTANEOUS
Status: DISCONTINUED | OUTPATIENT
Start: 2019-11-13 | End: 2019-11-14 | Stop reason: HOSPADM

## 2019-11-13 RX ORDER — SODIUM CHLORIDE, SODIUM LACTATE, POTASSIUM CHLORIDE, CALCIUM CHLORIDE 600; 310; 30; 20 MG/100ML; MG/100ML; MG/100ML; MG/100ML
INJECTION, SOLUTION INTRAVENOUS CONTINUOUS
Status: DISCONTINUED | OUTPATIENT
Start: 2019-11-13 | End: 2019-11-14 | Stop reason: HOSPADM

## 2019-11-13 RX ORDER — PROMETHAZINE HYDROCHLORIDE 25 MG/1
12.5-25 TABLET ORAL EVERY 4 HOURS PRN
Status: DISCONTINUED | OUTPATIENT
Start: 2019-11-13 | End: 2019-11-14 | Stop reason: HOSPADM

## 2019-11-13 RX ORDER — ACETAMINOPHEN 500 MG
1000 TABLET ORAL EVERY 6 HOURS PRN
COMMUNITY
End: 2020-07-09

## 2019-11-13 RX ORDER — AMOXICILLIN 250 MG
2 CAPSULE ORAL 2 TIMES DAILY
Status: DISCONTINUED | OUTPATIENT
Start: 2019-11-13 | End: 2019-11-14 | Stop reason: HOSPADM

## 2019-11-13 RX ORDER — PROCHLORPERAZINE EDISYLATE 5 MG/ML
5-10 INJECTION INTRAMUSCULAR; INTRAVENOUS EVERY 4 HOURS PRN
Status: DISCONTINUED | OUTPATIENT
Start: 2019-11-13 | End: 2019-11-14 | Stop reason: HOSPADM

## 2019-11-13 RX ORDER — ONDANSETRON 2 MG/ML
4 INJECTION INTRAMUSCULAR; INTRAVENOUS EVERY 4 HOURS PRN
Status: DISCONTINUED | OUTPATIENT
Start: 2019-11-13 | End: 2019-11-14 | Stop reason: HOSPADM

## 2019-11-13 RX ORDER — DIAZEPAM 5 MG/ML
5 INJECTION, SOLUTION INTRAMUSCULAR; INTRAVENOUS EVERY 6 HOURS PRN
Status: DISCONTINUED | OUTPATIENT
Start: 2019-11-13 | End: 2019-11-14 | Stop reason: HOSPADM

## 2019-11-13 RX ORDER — BISACODYL 10 MG
10 SUPPOSITORY, RECTAL RECTAL
Status: DISCONTINUED | OUTPATIENT
Start: 2019-11-13 | End: 2019-11-14 | Stop reason: HOSPADM

## 2019-11-13 RX ORDER — ONDANSETRON 2 MG/ML
4 INJECTION INTRAMUSCULAR; INTRAVENOUS ONCE
Status: COMPLETED | OUTPATIENT
Start: 2019-11-13 | End: 2019-11-13

## 2019-11-13 RX ORDER — OXYCODONE HYDROCHLORIDE 5 MG/1
10 TABLET ORAL
Status: DISCONTINUED | OUTPATIENT
Start: 2019-11-13 | End: 2019-11-14 | Stop reason: HOSPADM

## 2019-11-13 RX ADMIN — DIAZEPAM 5 MG: 5 INJECTION, SOLUTION INTRAMUSCULAR; INTRAVENOUS at 20:41

## 2019-11-13 RX ADMIN — HYDROMORPHONE HYDROCHLORIDE 0.5 MG: 1 INJECTION, SOLUTION INTRAMUSCULAR; INTRAVENOUS; SUBCUTANEOUS at 19:27

## 2019-11-13 RX ADMIN — IOHEXOL 100 ML: 350 INJECTION, SOLUTION INTRAVENOUS at 14:11

## 2019-11-13 RX ADMIN — MORPHINE SULFATE 4 MG: 4 INJECTION INTRAVENOUS at 13:29

## 2019-11-13 RX ADMIN — SODIUM CHLORIDE 1000 ML: 9 INJECTION, SOLUTION INTRAVENOUS at 13:10

## 2019-11-13 RX ADMIN — MORPHINE SULFATE 4 MG: 4 INJECTION INTRAVENOUS at 15:48

## 2019-11-13 RX ADMIN — LIDOCAINE HYDROCHLORIDE 30 ML: 20 SOLUTION OROPHARYNGEAL at 13:40

## 2019-11-13 RX ADMIN — ONDANSETRON 4 MG: 2 INJECTION INTRAMUSCULAR; INTRAVENOUS at 13:09

## 2019-11-13 RX ADMIN — HYDROMORPHONE HYDROCHLORIDE 0.5 MG: 1 INJECTION, SOLUTION INTRAMUSCULAR; INTRAVENOUS; SUBCUTANEOUS at 23:41

## 2019-11-13 RX ADMIN — CEFTRIAXONE SODIUM 2 G: 2 INJECTION, POWDER, FOR SOLUTION INTRAMUSCULAR; INTRAVENOUS at 15:21

## 2019-11-13 RX ADMIN — SODIUM CHLORIDE, POTASSIUM CHLORIDE, SODIUM LACTATE AND CALCIUM CHLORIDE: 600; 310; 30; 20 INJECTION, SOLUTION INTRAVENOUS at 18:15

## 2019-11-13 RX ADMIN — SODIUM CHLORIDE 1000 ML: 9 INJECTION, SOLUTION INTRAVENOUS at 17:21

## 2019-11-13 RX ADMIN — ACETAMINOPHEN 650 MG: 325 TABLET, FILM COATED ORAL at 20:40

## 2019-11-13 ASSESSMENT — LIFESTYLE VARIABLES
HAVE PEOPLE ANNOYED YOU BY CRITICIZING YOUR DRINKING: NO
TOTAL SCORE: 0
TOTAL SCORE: 0
AVERAGE NUMBER OF DAYS PER WEEK YOU HAVE A DRINK CONTAINING ALCOHOL: 0
HOW MANY TIMES IN THE PAST YEAR HAVE YOU HAD 5 OR MORE DRINKS IN A DAY: 0
ALCOHOL_USE: NO
EVER HAD A DRINK FIRST THING IN THE MORNING TO STEADY YOUR NERVES TO GET RID OF A HANGOVER: NO
EVER FELT BAD OR GUILTY ABOUT YOUR DRINKING: NO
ON A TYPICAL DAY WHEN YOU DRINK ALCOHOL HOW MANY DRINKS DO YOU HAVE: 0
TOTAL SCORE: 0
CONSUMPTION TOTAL: NEGATIVE
HAVE YOU EVER FELT YOU SHOULD CUT DOWN ON YOUR DRINKING: NO

## 2019-11-13 ASSESSMENT — COGNITIVE AND FUNCTIONAL STATUS - GENERAL
DAILY ACTIVITIY SCORE: 24
SUGGESTED CMS G CODE MODIFIER DAILY ACTIVITY: CH
MOBILITY SCORE: 24
SUGGESTED CMS G CODE MODIFIER MOBILITY: CH

## 2019-11-13 ASSESSMENT — ENCOUNTER SYMPTOMS
DIARRHEA: 1
CHILLS: 0
DIZZINESS: 0
MYALGIAS: 0
FLANK PAIN: 0
WEIGHT LOSS: 0
MUSCULOSKELETAL NEGATIVE: 1
BRUISES/BLEEDS EASILY: 0
NAUSEA: 1
ABDOMINAL PAIN: 1
NEUROLOGICAL NEGATIVE: 1
FEVER: 0
LOSS OF CONSCIOUSNESS: 0
CONSTITUTIONAL NEGATIVE: 1
SHORTNESS OF BREATH: 0
PSYCHIATRIC NEGATIVE: 1
DEPRESSION: 0
BACK PAIN: 0
WEAKNESS: 0
CARDIOVASCULAR NEGATIVE: 1
NERVOUS/ANXIOUS: 0
COUGH: 0
RESPIRATORY NEGATIVE: 1
VOMITING: 1

## 2019-11-13 ASSESSMENT — PATIENT HEALTH QUESTIONNAIRE - PHQ9
SUM OF ALL RESPONSES TO PHQ9 QUESTIONS 1 AND 2: 0
2. FEELING DOWN, DEPRESSED, IRRITABLE, OR HOPELESS: NOT AT ALL
1. LITTLE INTEREST OR PLEASURE IN DOING THINGS: NOT AT ALL
2. FEELING DOWN, DEPRESSED, IRRITABLE, OR HOPELESS: NOT AT ALL
SUM OF ALL RESPONSES TO PHQ9 QUESTIONS 1 AND 2: 0
1. LITTLE INTEREST OR PLEASURE IN DOING THINGS: NOT AT ALL

## 2019-11-13 NOTE — ED NOTES
Med rec updated and complete  Allergies reviewed  Pt reports no prescription medications or vitamins.  Pt reports no antibiotics in the last 2 weeks

## 2019-11-13 NOTE — ED TRIAGE NOTES
"Chief Complaint   Patient presents with   • Abdominal Pain     started this am   • Nausea/Vomiting/Diarrhea     /95   Pulse 83   Temp 37.1 °C (98.7 °F) (Oral)   Resp 18   Ht 1.651 m (5' 5\")   Wt 103.2 kg (227 lb 8.2 oz)   SpO2 96%   BMI 37.86 kg/m²     Pt reports has been worked up for Colitis  "

## 2019-11-13 NOTE — ED PROVIDER NOTES
CHIEF COMPLAINT  Chief Complaint   Patient presents with   • Abdominal Pain     started this am   • Nausea/Vomiting/Diarrhea       HPI  Yasemin Bradley is a 34 y.o. female who presents to the emergency department with complaint of severe left-sided abdominal pain.  She is denying pain approximately 2 days prior, the pain is sharp in nature, there is no radiation to the back, groin, chest.  The pain is 10/10 increases with movement decreased with rest.  She was recently diagnosed with colitis and was supposed to follow-up with her GI physician but unfortunately she has an able to make an appointment.  She returns today with subjective fevers, severe pain, nausea and vomiting.  She denies any vaginal bleeding, vaginal discharge, dysuria, hematochezia, melena or hematemesis, cough, fever.  REVIEW OF SYSTEMS  Positives as above. Pertinent negatives include dysuria, vaginal bleed, vaginal discharge all other review of systems are negative    PAST MEDICAL HISTORY  Past Medical History:   Diagnosis Date   • Arrhythmia     PVCs and PACs, has had holter in the past   • ASTHMA     in the past, no problem now   • Cold April 2017    upper respiratory infection   • Colitis    • Dysmenorrhea    • Fibromyalgia    • Narcotic abuse (HCC)    • Ovarian cyst    • PID (pelvic inflammatory disease)    • Pregnancy    • PVC's (premature ventricular contractions)    • Urinary bladder disorder     frequent UTI's       FAMILY HISTORY  Noncontributory    SOCIAL HISTORY  Social History     Socioeconomic History   • Marital status: Single     Spouse name: Not on file   • Number of children: Not on file   • Years of education: Not on file   • Highest education level: Not on file   Occupational History   • Not on file   Social Needs   • Financial resource strain: Not on file   • Food insecurity:     Worry: Not on file     Inability: Not on file   • Transportation needs:     Medical: Not on file     Non-medical: Not on file   Tobacco Use   •  Smoking status: Former Smoker     Packs/day: 0.50     Types: Cigarettes   • Tobacco comment: quit 4wks ago   Substance and Sexual Activity   • Alcohol use: No   • Drug use: No     Types: Oral     Comment: hx of opiate addiction, clean since 12/12/12   • Sexual activity: Not on file   Lifestyle   • Physical activity:     Days per week: Not on file     Minutes per session: Not on file   • Stress: Not on file   Relationships   • Social connections:     Talks on phone: Not on file     Gets together: Not on file     Attends Nondenominational service: Not on file     Active member of club or organization: Not on file     Attends meetings of clubs or organizations: Not on file     Relationship status: Not on file   • Intimate partner violence:     Fear of current or ex partner: Not on file     Emotionally abused: Not on file     Physically abused: Not on file     Forced sexual activity: Not on file   Other Topics Concern   • Not on file   Social History Narrative   • Not on file       SURGICAL HISTORY  Past Surgical History:   Procedure Laterality Date   • HYSTEROSCOPY THERMAL ABLATION N/A 7/10/2017    Procedure: HYSTEROSCOPY THERMAL ABLATION endometrial ;  Surgeon: Jericho Fields M.D.;  Location: SURGERY SAME DAY [facility];  Service:    • TUBAL COAGULATION LAPAROSCOPIC BILATERAL Bilateral 7/10/2017    Procedure: TUBAL COAGULATION LAPAROSCOPIC BILATERAL;  Surgeon: Jericho Fields M.D.;  Location: SURGERY SAME DAY [facility];  Service:    • DILATION AND CURETTAGE N/A 7/10/2017    Procedure: DILATION AND CURETTAGE;  Surgeon: Jericho Fields M.D.;  Location: SURGERY SAME DAY [facility];  Service:    • PRIMARY C SECTION  12/19/2010    Performed by JERICHO FIELDS at LABOR AND DELIVERY   • OTHER       T & A  age 14        CURRENT MEDICATIONS  Home Medications    **Home medications have not yet been reviewed for this encounter**         ALLERGIES  No Known Allergies    PHYSICAL EXAM  VITAL SIGNS: /89   Pulse 80    "Temp 36.6 °C (97.9 °F) (Temporal)   Resp 16   Ht 1.651 m (5' 5\")   Wt 103.2 kg (227 lb 8.2 oz)   SpO2 97%   BMI 37.86 kg/m²      Constitutional: Well developed, Well nourished, mild acute distress, Non-toxic appearance.   Eyes: PERRLA, EOMI, Conjunctiva normal, No discharge.   Cardiovascular: Normal heart rate, Normal rhythm, No murmurs, No rubs, No gallops, and intact distal pulses.   Thorax & Lungs:  No respiratory distress, no rales, no rhonchi, No wheezing, No chest wall tenderness.   Abdomen: Bowel sounds normal, Soft, left upper quadrant tenderness with peritoneal signs, No guarding, No rebound, No pulsatile masses.   Skin: Warm, Dry, No erythema, No rash.   Extremities: Full range of motion, no deformity, no edema.  Neurologic: Alert & oriented x 3, No focal deficits noted, acting appropriately on exam.  Psychiatric: Affect normal for clinical presentation.    Results for orders placed or performed during the hospital encounter of 11/13/19   CBC WITH DIFFERENTIAL   Result Value Ref Range    WBC 12.4 (H) 4.8 - 10.8 K/uL    RBC 5.24 4.20 - 5.40 M/uL    Hemoglobin 16.0 12.0 - 16.0 g/dL    Hematocrit 47.3 (H) 37.0 - 47.0 %    MCV 90.3 81.4 - 97.8 fL    MCH 30.5 27.0 - 33.0 pg    MCHC 33.8 33.6 - 35.0 g/dL    RDW 42.2 35.9 - 50.0 fL    Platelet Count 226 164 - 446 K/uL    MPV 9.4 9.0 - 12.9 fL    Neutrophils-Polys 78.70 (H) 44.00 - 72.00 %    Lymphocytes 15.50 (L) 22.00 - 41.00 %    Monocytes 4.40 0.00 - 13.40 %    Eosinophils 0.60 0.00 - 6.90 %    Basophils 0.40 0.00 - 1.80 %    Immature Granulocytes 0.40 0.00 - 0.90 %    Nucleated RBC 0.00 /100 WBC    Neutrophils (Absolute) 9.78 (H) 2.00 - 7.15 K/uL    Lymphs (Absolute) 1.92 1.00 - 4.80 K/uL    Monos (Absolute) 0.54 0.00 - 0.85 K/uL    Eos (Absolute) 0.07 0.00 - 0.51 K/uL    Baso (Absolute) 0.05 0.00 - 0.12 K/uL    Immature Granulocytes (abs) 0.05 0.00 - 0.11 K/uL    NRBC (Absolute) 0.00 K/uL   COMP METABOLIC PANEL   Result Value Ref Range    Sodium 138 135 " - 145 mmol/L    Potassium 4.0 3.6 - 5.5 mmol/L    Chloride 102 96 - 112 mmol/L    Co2 21 20 - 33 mmol/L    Anion Gap 15.0 (H) 0.0 - 11.9    Glucose 94 65 - 99 mg/dL    Bun 11 8 - 22 mg/dL    Creatinine 0.62 0.50 - 1.40 mg/dL    Calcium 9.8 8.4 - 10.2 mg/dL    AST(SGOT) 17 12 - 45 U/L    ALT(SGPT) 15 2 - 50 U/L    Alkaline Phosphatase 65 30 - 99 U/L    Total Bilirubin 0.9 0.1 - 1.5 mg/dL    Albumin 4.5 3.2 - 4.9 g/dL    Total Protein 7.7 6.0 - 8.2 g/dL    Globulin 3.2 1.9 - 3.5 g/dL    A-G Ratio 1.4 g/dL   URINALYSIS CULTURE, IF INDICATED   Result Value Ref Range    Color Yellow     Character Clear     Specific Gravity 1.010 <1.035    Ph 6.5 5.0 - 8.0    Glucose Negative Negative mg/dL    Ketones Negative Negative mg/dL    Protein Negative Negative mg/dL    Bilirubin Negative Negative    Nitrite Negative Negative    Leukocyte Esterase Negative Negative    Occult Blood Negative Negative    Micro Urine Req see below    HCG QUAL SERUM   Result Value Ref Range    Beta-Hcg Qualitative Serum Negative Negative   LACTIC ACID   Result Value Ref Range    Lactic Acid 1.0 0.5 - 2.0 mmol/L   ESTIMATED GFR   Result Value Ref Range    GFR If African American >60 >60 mL/min/1.73 m 2    GFR If Non African American >60 >60 mL/min/1.73 m 2       RADIOLOGY/PROCEDURES  CT-ABDOMEN-PELVIS WITH   Final Result      Infectious/inflammatory descending colitis            COURSE & MEDICAL DECISION MAKING  Pertinent Labs & Imaging studies reviewed. (See chart for details)  This is a 34-year-old female presents with infectious colitis.  The patient does have history of subjective fevers, leukocytosis, peritoneal signs on examination.  She received IV fluid secondary to her n.p.o. status, morphine 4 mg IV and Zofran 4 mg IV.  On reevaluation after CT scan revealing evidence of inflammatory/infectious colitis, the patient continues have significant abdominal discomfort and peritoneal signs.  For this reason, I do believe the patient requires  hospitalization for observation of her inflammatory/infectious colitis.  She is received ceftriaxone 2 g IV once again normal saline infusion of fluid.  She is complaining of severe pain at 1520 requiring 4 more milligrams of morphine IV.  I discussed the patient with Dr. Monsivais who is graciously accepted admission to the hospital peer    New Prescriptions    No medications on file       FINAL IMPRESSION  Inflammatory/infectious colitis of the descending colon  Severe abdominal pain         Electronically signed by: Keanu Calvert, 11/13/2019 1:17 PM

## 2019-11-14 VITALS
DIASTOLIC BLOOD PRESSURE: 80 MMHG | HEART RATE: 73 BPM | TEMPERATURE: 98.4 F | OXYGEN SATURATION: 98 % | RESPIRATION RATE: 18 BRPM | BODY MASS INDEX: 37.91 KG/M2 | HEIGHT: 65 IN | WEIGHT: 227.51 LBS | SYSTOLIC BLOOD PRESSURE: 124 MMHG

## 2019-11-14 LAB
BASOPHILS # BLD AUTO: 0.6 % (ref 0–1.8)
BASOPHILS # BLD: 0.06 K/UL (ref 0–0.12)
EOSINOPHIL # BLD AUTO: 0.21 K/UL (ref 0–0.51)
EOSINOPHIL NFR BLD: 2.3 % (ref 0–6.9)
ERYTHROCYTE [DISTWIDTH] IN BLOOD BY AUTOMATED COUNT: 44.3 FL (ref 35.9–50)
HCT VFR BLD AUTO: 39.9 % (ref 37–47)
HGB BLD-MCNC: 13.3 G/DL (ref 12–16)
IMM GRANULOCYTES # BLD AUTO: 0.03 K/UL (ref 0–0.11)
IMM GRANULOCYTES NFR BLD AUTO: 0.3 % (ref 0–0.9)
LYMPHOCYTES # BLD AUTO: 2.46 K/UL (ref 1–4.8)
LYMPHOCYTES NFR BLD: 26.4 % (ref 22–41)
MCH RBC QN AUTO: 30.9 PG (ref 27–33)
MCHC RBC AUTO-ENTMCNC: 33.3 G/DL (ref 33.6–35)
MCV RBC AUTO: 92.6 FL (ref 81.4–97.8)
MONOCYTES # BLD AUTO: 0.53 K/UL (ref 0–0.85)
MONOCYTES NFR BLD AUTO: 5.7 % (ref 0–13.4)
NEUTROPHILS # BLD AUTO: 6.04 K/UL (ref 2–7.15)
NEUTROPHILS NFR BLD: 64.7 % (ref 44–72)
NRBC # BLD AUTO: 0 K/UL
NRBC BLD-RTO: 0 /100 WBC
PLATELET # BLD AUTO: 176 K/UL (ref 164–446)
PMV BLD AUTO: 9.5 FL (ref 9–12.9)
RBC # BLD AUTO: 4.31 M/UL (ref 4.2–5.4)
WBC # BLD AUTO: 9.3 K/UL (ref 4.8–10.8)

## 2019-11-14 PROCEDURE — A9270 NON-COVERED ITEM OR SERVICE: HCPCS | Performed by: HOSPITALIST

## 2019-11-14 PROCEDURE — G0378 HOSPITAL OBSERVATION PER HR: HCPCS

## 2019-11-14 PROCEDURE — 99217 PR OBSERVATION CARE DISCHARGE: CPT | Performed by: INTERNAL MEDICINE

## 2019-11-14 PROCEDURE — 700102 HCHG RX REV CODE 250 W/ 637 OVERRIDE(OP): Performed by: HOSPITALIST

## 2019-11-14 PROCEDURE — 700105 HCHG RX REV CODE 258: Performed by: HOSPITALIST

## 2019-11-14 PROCEDURE — 700111 HCHG RX REV CODE 636 W/ 250 OVERRIDE (IP): Performed by: HOSPITALIST

## 2019-11-14 PROCEDURE — 85025 COMPLETE CBC W/AUTO DIFF WBC: CPT

## 2019-11-14 PROCEDURE — 80053 COMPREHEN METABOLIC PANEL: CPT

## 2019-11-14 PROCEDURE — 96376 TX/PRO/DX INJ SAME DRUG ADON: CPT

## 2019-11-14 PROCEDURE — 36415 COLL VENOUS BLD VENIPUNCTURE: CPT

## 2019-11-14 RX ORDER — ONDANSETRON 4 MG/1
4 TABLET, ORALLY DISINTEGRATING ORAL EVERY 6 HOURS PRN
Qty: 10 TAB | Refills: 0 | Status: SHIPPED | OUTPATIENT
Start: 2019-11-14 | End: 2021-10-14

## 2019-11-14 RX ORDER — OXYCODONE HYDROCHLORIDE 5 MG/1
5 TABLET ORAL EVERY 6 HOURS PRN
Qty: 18 TAB | Refills: 0 | Status: SHIPPED | OUTPATIENT
Start: 2019-11-14 | End: 2019-11-17

## 2019-11-14 RX ADMIN — ONDANSETRON 4 MG: 2 INJECTION INTRAMUSCULAR; INTRAVENOUS at 08:18

## 2019-11-14 RX ADMIN — DIAZEPAM 5 MG: 5 INJECTION, SOLUTION INTRAMUSCULAR; INTRAVENOUS at 08:28

## 2019-11-14 RX ADMIN — SENNOSIDES, DOCUSATE SODIUM 2 TABLET: 50; 8.6 TABLET, FILM COATED ORAL at 05:06

## 2019-11-14 RX ADMIN — HYDROMORPHONE HYDROCHLORIDE 0.5 MG: 1 INJECTION, SOLUTION INTRAMUSCULAR; INTRAVENOUS; SUBCUTANEOUS at 08:28

## 2019-11-14 RX ADMIN — SODIUM CHLORIDE, POTASSIUM CHLORIDE, SODIUM LACTATE AND CALCIUM CHLORIDE 1000 ML: 600; 310; 30; 20 INJECTION, SOLUTION INTRAVENOUS at 04:19

## 2019-11-14 RX ADMIN — OXYCODONE HYDROCHLORIDE 10 MG: 5 TABLET ORAL at 04:18

## 2019-11-14 NOTE — CARE PLAN
Problem: Safety  Goal: Will remain free from falls  Outcome: PROGRESSING AS EXPECTED  Note:   Pt educated to use call light before getting up. And verbalized understanding     Problem: Infection  Goal: Will remain free from infection  Outcome: PROGRESSING SLOWER THAN EXPECTED

## 2019-11-14 NOTE — ASSESSMENT & PLAN NOTE
Multiple fibroids and has had multiple procedures without relief of menorrhagia.  She is scheduled to have a hysterectomy next week, this may need to be put off due to the colitis but I urged the patient to have this current bout treated and then follow-up later as an outpatient.

## 2019-11-14 NOTE — CARE PLAN
Problem: Communication  Goal: The ability to communicate needs accurately and effectively will improve  Outcome: PROGRESSING AS EXPECTED  Note:   Pt encouraged to use call light for needs.     Problem: Pain Management  Goal: Pain level will decrease to patient's comfort goal  Outcome: PROGRESSING AS EXPECTED  Note:   Pt with 6/10 cramping pain in mid/upper left abdomen. Pt requests to be given only IV pain meds. Pt requests pain med be given later this evening. Will continue to monitor.

## 2019-11-14 NOTE — H&P
Hospital Medicine History & Physical Note    Date of Service  11/13/2019    Primary Care Physician  MARIA G Taylor.    Consultants  None.    Code Status  Full     Chief Complaint  Nausea and vomiting    History of Presenting Illness  34 y.o. female who presented 11/13/2019 with intractable nausea, vomiting, diarrhea and abdominal cramping.  This is been going on over 48 hours.  She has had this previously and couple weeks ago was treated at [place] and discharged with referral to GI.  Patient went to her primary care provider to get a referral but has not gotten an appointment yet.  She felt well in the meanwhile but then over the past 48 hours has had return of symptoms after once again eating a salad the night before.  Patient states she has been unable to hold anything down and has had severe diarrhea as well.  Today she was feeling very dehydrated and was having severe painful cramping of her stomach.  Denies any fevers or chills.    Review of Systems  Review of Systems   Constitutional: Negative.  Negative for chills, fever, malaise/fatigue and weight loss.   HENT: Negative.    Respiratory: Negative.  Negative for cough and shortness of breath.    Cardiovascular: Negative.  Negative for chest pain and leg swelling.   Gastrointestinal: Positive for abdominal pain, diarrhea, nausea and vomiting.   Genitourinary: Negative.  Negative for dysuria and flank pain.   Musculoskeletal: Negative.  Negative for back pain and myalgias.   Neurological: Negative.  Negative for dizziness, loss of consciousness and weakness.   Endo/Heme/Allergies: Negative.  Does not bruise/bleed easily.   Psychiatric/Behavioral: Negative.  Negative for depression. The patient is not nervous/anxious.    All other systems reviewed and are negative.      Past Medical History   has a past medical history of Arrhythmia, ASTHMA, Cold (April 2017), Colitis, Dysmenorrhea, Fibromyalgia, Narcotic abuse ([facility]), Ovarian cyst, PID (pelvic  inflammatory disease), Pregnancy, PVC's (premature ventricular contractions), and Urinary bladder disorder. She also has no past medical history of CAD (coronary artery disease), Cancer (HCC), Congestive heart failure (HCC), COPD, Diabetes, Hypertension, Liver disease, Psychiatric disorder, Renal disorder, Seizure disorder (HCC), or Stroke (HCC).    Surgical History   has a past surgical history that includes other; primary c section (12/19/2010); hysteroscopy thermal ablation (N/A, 7/10/2017); tubal coagulation laparoscopic bilateral (Bilateral, 7/10/2017); and dilation and curettage (N/A, 7/10/2017).     Family History  family history includes No Known Problems in her father and mother.     Social History   reports that she has quit smoking. Her smoking use included cigarettes. She smoked 0.50 packs per day. She does not have any smokeless tobacco history on file. She reports that she does not drink alcohol or use drugs.    Allergies  No Known Allergies    Medications  Prior to Admission Medications   Prescriptions Last Dose Informant Patient Reported? Taking?   acetaminophen (TYLENOL) 500 MG Tab 11/13/2019 at 0100 Patient Yes Yes   Sig: Take 1,000 mg by mouth every 6 hours as needed for Moderate Pain.   ibuprofen (MOTRIN) 200 MG Tab > 2 days at unknown Patient Yes Yes   Sig: Take 800 mg by mouth every 6 hours as needed for Mild Pain.      Facility-Administered Medications: None       Physical Exam  Temp:  [36.6 °C (97.8 °F)-37.1 °C (98.7 °F)] 36.6 °C (97.8 °F)  Pulse:  [51-83] 60  Resp:  [16-18] 18  BP: ()/(55-95) 115/66  SpO2:  [93 %-99 %] 96 %    Physical Exam  Vitals signs and nursing note reviewed.   Constitutional:       General: She is not in acute distress.     Appearance: She is well-developed. She is not diaphoretic.   HENT:      Right Ear: External ear normal.      Left Ear: External ear normal.      Nose: Nose normal.      Mouth/Throat:      Mouth: Mucous membranes are dry.      Pharynx: No  oropharyngeal exudate or posterior oropharyngeal erythema.   Eyes:      General: No scleral icterus.        Right eye: No discharge.         Left eye: No discharge.      Conjunctiva/sclera: Conjunctivae normal.   Neck:      Vascular: No JVD.      Trachea: No tracheal deviation.   Cardiovascular:      Rate and Rhythm: Normal rate and regular rhythm.      Heart sounds: Normal heart sounds.   Pulmonary:      Effort: Pulmonary effort is normal. No respiratory distress.      Breath sounds: Normal breath sounds. No stridor. No wheezing or rales.   Chest:      Chest wall: No tenderness.   Abdominal:      General: Bowel sounds are normal. There is no distension.      Palpations: Abdomen is soft.      Tenderness: There is no tenderness.   Musculoskeletal:         General: No tenderness.   Skin:     General: Skin is warm and dry.      Coloration: Skin is not pale.   Neurological:      Mental Status: She is alert.      Cranial Nerves: No cranial nerve deficit.      Motor: No abnormal muscle tone.   Psychiatric:         Mood and Affect: Mood normal.         Behavior: Behavior normal.         Laboratory:  Recent Labs     11/13/19  1307   WBC 12.4*   RBC 5.24   HEMOGLOBIN 16.0   HEMATOCRIT 47.3*   MCV 90.3   MCH 30.5   MCHC 33.8   RDW 42.2   PLATELETCT 226   MPV 9.4     Recent Labs     11/13/19  1307   SODIUM 138   POTASSIUM 4.0   CHLORIDE 102   CO2 21   GLUCOSE 94   BUN 11   CREATININE 0.62   CALCIUM 9.8     Recent Labs     11/13/19  1307   ALTSGPT 15   ASTSGOT 17   ALKPHOSPHAT 65   TBILIRUBIN 0.9   GLUCOSE 94         No results for input(s): NTPROBNP in the last 72 hours.      No results for input(s): TROPONINT in the last 72 hours.    Urinalysis:    Recent Labs     11/13/19  1208   SPECGRAVITY 1.010   GLUCOSEUR Negative   KETONES Negative   NITRITE Negative   LEUKESTERAS Negative        Imaging:  CT-ABDOMEN-PELVIS WITH   Final Result      Infectious/inflammatory descending colitis            Assessment/Plan:  I anticipate this  patient is appropriate for observation status at this time.    Colitis, acute  Assessment & Plan  This has been recurrent, she is pending GI evaluation.  Could be acute gastroenteritis as it is accompanied by both diarrhea and vomiting.  Full liquid diet.  IV fluids normal saline 125 cc/h as she has dry mucous membranes and appears clinically dehydrated though her labs are normal.  Antiemetics as needed.  Does not have risk factors for C. Difficile.  After resuscitation recommend urgent follow-up with GI and consideration of colonoscopy.    Excessive or frequent menstruation- (present on admission)  Assessment & Plan  Multiple fibroids and has had multiple procedures without relief of menorrhagia.  She is scheduled to have a hysterectomy next week, this may need to be put off due to the colitis but I urged the patient to have this current bout treated and then follow-up later as an outpatient.        VTE prophylaxis: scd

## 2019-11-14 NOTE — ASSESSMENT & PLAN NOTE
This has been recurrent, she is pending GI evaluation.  Could be acute gastroenteritis as it is accompanied by both diarrhea and vomiting.  Full liquid diet.  IV fluids normal saline 125 cc/h as she has dry mucous membranes and appears clinically dehydrated though her labs are normal.  Antiemetics as needed.  Does not have risk factors for C. Difficile.  After resuscitation recommend urgent follow-up with GI and consideration of colonoscopy.  Pain management with IV Dilaudid or oral oxycodone as needed.  Valium 5mg IV as needed for intestinal spasms.

## 2019-11-14 NOTE — DISCHARGE INSTRUCTIONS
Discharge Instructions per Marce Amor M.D.    Follow up with GI as soon as possible    DIET: regular    ACTIVITY: as tolerated    DIAGNOSIS: colitis    Return to ER if symptoms worsen    Discharge Instructions    Discharged to home by car with relative. Discharged via walking, hospital escort: Yes.  Special equipment needed: Not Applicable    Be sure to schedule a follow-up appointment with your primary care doctor or any specialists as instructed.     Discharge Plan:   Influenza Vaccine Indication: Patient Refuses    I understand that a diet low in cholesterol, fat, and sodium is recommended for good health. Unless I have been given specific instructions below for another diet, I accept this instruction as my diet prescription.   Other diet: Regular diet    Special Instructions:       Colitis  Introduction  Colitis is inflammation of the colon. Colitis may last a short time (acute) or it may last a long time (chronic).  What are the causes?  This condition may be caused by:  · Viruses.  · Bacteria.  · Reactions to medicine.  · Certain autoimmune diseases, such as Crohn disease or ulcerative colitis.  What are the signs or symptoms?  Symptoms of this condition include:  · Diarrhea.  · Passing bloody or tarry stool.  · Pain.  · Fever.  · Vomiting.  · Tiredness (fatigue).  · Weight loss.  · Bloating.  · Sudden increase in abdominal pain.  · Having fewer bowel movements than usual.  How is this diagnosed?  This condition is diagnosed with a stool test or a blood test. You may also have other tests, including X-rays, a CT scan, or a colonoscopy.  How is this treated?  Treatment may include:  · Resting the bowel. This involves not eating or drinking for a period of time.  · Fluids that are given through an IV tube.  · Medicine for pain and diarrhea.  · Antibiotic medicines.  · Cortisone medicines.  · Surgery.  Follow these instructions at home:  Eating and drinking  · Follow instructions from your health care  provider about eating or drinking restrictions.  · Drink enough fluid to keep your urine clear or pale yellow.  · Work with a dietitian to determine which foods cause your condition to flare up.  · Avoid foods that cause flare-ups.  · Eat a well-balanced diet.  Medicines  · Take over-the-counter and prescription medicines only as told by your health care provider.  · If you were prescribed an antibiotic medicine, take it as told by your health care provider. Do not stop taking the antibiotic even if you start to feel better.  General instructions  · Keep all follow-up visits as told by your health care provider. This is important.  Contact a health care provider if:  · Your symptoms do not go away.  · You develop new symptoms.  Get help right away if:  · You have a fever that does not go away with treatment.  · You develop chills.  · You have extreme weakness, fainting, or dehydration.  · You have repeated vomiting.  · You develop severe pain in your abdomen.  · You pass bloody or tarry stool.  This information is not intended to replace advice given to you by your health care provider. Make sure you discuss any questions you have with your health care provider.  Document Released: 01/25/2006 Document Revised: 05/25/2017 Document Reviewed: 04/11/2016  © 2017 Elsevier        · Is patient discharged on Warfarin / Coumadin?   No     Depression / Suicide Risk    As you are discharged from this [facility] facility, it is important to learn how to keep safe from harming yourself.    Recognize the warning signs:  · Abrupt changes in personality, positive or negative- including increase in energy   · Giving away possessions  · Change in eating patterns- significant weight changes-  positive or negative  · Change in sleeping patterns- unable to sleep or sleeping all the time   · Unwillingness or inability to communicate  · Depression  · Unusual sadness, discouragement and loneliness  · Talk of wanting to die  · Neglect of  personal appearance   · Rebelliousness- reckless behavior  · Withdrawal from people/activities they love  · Confusion- inability to concentrate     If you or a loved one observes any of these behaviors or has concerns about self-harm, here's what you can do:  · Talk about it- your feelings and reasons for harming yourself  · Remove any means that you might use to hurt yourself (examples: pills, rope, extension cords, firearm)  · Get professional help from the community (Mental Health, Substance Abuse, psychological counseling)  · Do not be alone:Call your Safe Contact- someone whom you trust who will be there for you.  · Call your local CRISIS HOTLINE 079-5266 or 797-552-8582  · Call your local Children's Mobile Crisis Response Team [place] (683) 532-0076 or www.[employer]  · Call the toll free National Suicide Prevention Hotlines   · National Suicide Prevention Lifeline 450-772-GQZA (2526)  · National Hope Line Network 800-SUICIDE (008-7065)

## 2019-11-14 NOTE — DISCHARGE SUMMARY
Discharge Summary    CHIEF COMPLAINT ON ADMISSION  Chief Complaint   Patient presents with   • Abdominal Pain     started this am   • Nausea/Vomiting/Diarrhea       Reason for Admission  Abdominal Pain     Admission Date  11/13/2019    CODE STATUS  Full Code    HPI & HOSPITAL COURSE  This is a 34 y.o. female here with abdominal pain and recurrent colitis. She had recently been treated at [facility] for colitis and recommended to have outpatient GI follow up but had not been able to see them yet. She then presented here with worsened abdominal pain and CT scan showed her known distal colon colitis. She was treated with iv fluids and pain control with improvement in her symptoms to a bearable level. I did call GI consultants myself as she had seen them last in 2016 and she is placed on a cancellation appointment list for an appointment as soon as possible.  She also has an appointment set for Feb 5, 2020 at 10 AM with Dr. Mckay and was given the information for this.       Therefore, she is discharged in good and stable condition to home with close outpatient follow-up.      Discharge Date  11/14/2019    FOLLOW UP ITEMS POST DISCHARGE  Follow up with GI for colonoscopy as soon as possible, referral was sent for GI consultants     DISCHARGE DIAGNOSES  Active Problems:    Excessive or frequent menstruation POA: Yes    Colitis, acute POA: Yes  Resolved Problems:    * No resolved hospital problems. *      FOLLOW UP  No future appointments.  Lakshmi Akins A.P.R.NElzbieta  [address] [facility] [id number] [phone]          [facility]  [address] [place] [id number] [phone]  Schedule an appointment as soon as possible for a visit        MEDICATIONS ON DISCHARGE     Medication List      START taking these medications      Instructions   ondansetron 4 MG Tbdp  Commonly known as:  ZOFRAN ODT   Take 1 Tab by mouth every 6 hours as needed for Nausea.  Dose:  4 mg      oxyCODONE immediate-release 5 MG Tabs  Commonly known as:  ROXICODONE   Take 1 Tab by mouth every 6 hours as needed for Severe Pain for up to 3 days.  Dose:  5 mg        CONTINUE taking these medications      Instructions   acetaminophen 500 MG Tabs  Commonly known as:  TYLENOL   Take 1,000 mg by mouth every 6 hours as needed for Moderate Pain.  Dose:  1,000 mg     ibuprofen 200 MG Tabs  Commonly known as:  MOTRIN   Take 800 mg by mouth every 6 hours as needed for Mild Pain.  Dose:  800 mg            Allergies  No Known Allergies    DIET  Orders Placed This Encounter   Procedures   • Diet Order Full Liquid     Standing Status:   Standing     Number of Occurrences:   1     Order Specific Question:   Diet:     Answer:   Full Liquid [11]       ACTIVITY  As tolerated.  Weight bearing as tolerated    CONSULTATIONS  none    PROCEDURES  none    LABORATORY  Lab Results   Component Value Date    SODIUM 138 11/13/2019    POTASSIUM 4.0 11/13/2019    CHLORIDE 102 11/13/2019    CO2 21 11/13/2019    GLUCOSE 94 11/13/2019    BUN 11 11/13/2019    CREATININE 0.62 11/13/2019    CREATININE 0.8 05/05/2009        Lab Results   Component Value Date    WBC 9.3 11/14/2019    HEMOGLOBIN 13.3 11/14/2019    HEMATOCRIT 39.9 11/14/2019    PLATELETCT 176 11/14/2019        Total time of the discharge process exceeds 27 minutes.

## 2019-11-15 LAB
ALBUMIN SERPL BCP-MCNC: 3.5 G/DL (ref 3.2–4.9)
ALBUMIN/GLOB SERPL: 1.6 G/DL
ALP SERPL-CCNC: 49 U/L (ref 30–99)
ALT SERPL-CCNC: 10 U/L (ref 2–50)
ANION GAP SERPL CALC-SCNC: 10 MMOL/L (ref 0–11.9)
AST SERPL-CCNC: 12 U/L (ref 12–45)
BILIRUB SERPL-MCNC: 0.6 MG/DL (ref 0.1–1.5)
BUN SERPL-MCNC: 8 MG/DL (ref 8–22)
CALCIUM SERPL-MCNC: 8.5 MG/DL (ref 8.4–10.2)
CHLORIDE SERPL-SCNC: 105 MMOL/L (ref 96–112)
CO2 SERPL-SCNC: 25 MMOL/L (ref 20–33)
CREAT SERPL-MCNC: 0.61 MG/DL (ref 0.5–1.4)
GLOBULIN SER CALC-MCNC: 2.2 G/DL (ref 1.9–3.5)
GLUCOSE SERPL-MCNC: 99 MG/DL (ref 65–99)
POTASSIUM SERPL-SCNC: 4 MMOL/L (ref 3.6–5.5)
PROT SERPL-MCNC: 5.7 G/DL (ref 6–8.2)
SODIUM SERPL-SCNC: 140 MMOL/L (ref 135–145)

## 2019-11-20 DIAGNOSIS — Z01.812 PRE-PROCEDURAL LABORATORY EXAMINATION: ICD-10-CM

## 2019-11-20 LAB
ANION GAP SERPL CALC-SCNC: 11 MMOL/L (ref 0–11.9)
BUN SERPL-MCNC: 9 MG/DL (ref 8–22)
CALCIUM SERPL-MCNC: 9.4 MG/DL (ref 8.5–10.5)
CHLORIDE SERPL-SCNC: 103 MMOL/L (ref 96–112)
CO2 SERPL-SCNC: 27 MMOL/L (ref 20–33)
CREAT SERPL-MCNC: 0.72 MG/DL (ref 0.5–1.4)
ERYTHROCYTE [DISTWIDTH] IN BLOOD BY AUTOMATED COUNT: 43.8 FL (ref 35.9–50)
GLUCOSE SERPL-MCNC: 92 MG/DL (ref 65–99)
HCG SERPL QL: NEGATIVE
HCT VFR BLD AUTO: 46.5 % (ref 37–47)
HGB BLD-MCNC: 15.4 G/DL (ref 12–16)
MCH RBC QN AUTO: 31.4 PG (ref 27–33)
MCHC RBC AUTO-ENTMCNC: 33.1 G/DL (ref 33.6–35)
MCV RBC AUTO: 94.7 FL (ref 81.4–97.8)
PLATELET # BLD AUTO: 228 K/UL (ref 164–446)
PMV BLD AUTO: 9.7 FL (ref 9–12.9)
POTASSIUM SERPL-SCNC: 4 MMOL/L (ref 3.6–5.5)
RBC # BLD AUTO: 4.91 M/UL (ref 4.2–5.4)
SODIUM SERPL-SCNC: 141 MMOL/L (ref 135–145)
WBC # BLD AUTO: 6.8 K/UL (ref 4.8–10.8)

## 2019-11-20 PROCEDURE — 80048 BASIC METABOLIC PNL TOTAL CA: CPT

## 2019-11-20 PROCEDURE — 84703 CHORIONIC GONADOTROPIN ASSAY: CPT

## 2019-11-20 PROCEDURE — 85027 COMPLETE CBC AUTOMATED: CPT

## 2019-11-20 PROCEDURE — 36415 COLL VENOUS BLD VENIPUNCTURE: CPT

## 2019-11-20 RX ORDER — POLYETHYLENE GLYCOL 3350 17 G/17G
17 POWDER, FOR SOLUTION ORAL 2 TIMES DAILY
COMMUNITY
End: 2020-07-09

## 2019-11-21 ENCOUNTER — PATIENT OUTREACH (OUTPATIENT)
Dept: HEALTH INFORMATION MANAGEMENT | Facility: OTHER | Age: 34
End: 2019-11-21

## 2019-11-21 ENCOUNTER — ANESTHESIA EVENT (OUTPATIENT)
Dept: SURGERY | Facility: MEDICAL CENTER | Age: 34
DRG: 743 | End: 2019-11-21
Payer: MEDICAID

## 2019-11-21 ENCOUNTER — ANESTHESIA (OUTPATIENT)
Dept: SURGERY | Facility: MEDICAL CENTER | Age: 34
DRG: 743 | End: 2019-11-21
Payer: MEDICAID

## 2019-11-21 ENCOUNTER — HOSPITAL ENCOUNTER (INPATIENT)
Facility: MEDICAL CENTER | Age: 34
LOS: 1 days | DRG: 743 | End: 2019-11-22
Attending: SPECIALIST | Admitting: SPECIALIST
Payer: MEDICAID

## 2019-11-21 DIAGNOSIS — G89.18 POST-OP PAIN: ICD-10-CM

## 2019-11-21 LAB — PATHOLOGY CONSULT NOTE: NORMAL

## 2019-11-21 PROCEDURE — 500854 HCHG NEEDLE, INSUFFLATION FOR STEP: Performed by: SPECIALIST

## 2019-11-21 PROCEDURE — 700111 HCHG RX REV CODE 636 W/ 250 OVERRIDE (IP): Performed by: ANESTHESIOLOGY

## 2019-11-21 PROCEDURE — 88307 TISSUE EXAM BY PATHOLOGIST: CPT

## 2019-11-21 PROCEDURE — 160009 HCHG ANES TIME/MIN: Performed by: SPECIALIST

## 2019-11-21 PROCEDURE — 700111 HCHG RX REV CODE 636 W/ 250 OVERRIDE (IP): Performed by: SPECIALIST

## 2019-11-21 PROCEDURE — 501577 HCHG TROCAR, STEP 11MM: Performed by: SPECIALIST

## 2019-11-21 PROCEDURE — 700102 HCHG RX REV CODE 250 W/ 637 OVERRIDE(OP): Performed by: ANESTHESIOLOGY

## 2019-11-21 PROCEDURE — 160029 HCHG SURGERY MINUTES - 1ST 30 MINS LEVEL 4: Performed by: SPECIALIST

## 2019-11-21 PROCEDURE — 501330 HCHG SET, CYSTO IRRIG TUBING: Performed by: SPECIALIST

## 2019-11-21 PROCEDURE — 500002 HCHG ADHESIVE, DERMABOND: Performed by: SPECIALIST

## 2019-11-21 PROCEDURE — 500886 HCHG PACK, LAPAROSCOPY: Performed by: SPECIALIST

## 2019-11-21 PROCEDURE — 501838 HCHG SUTURE GENERAL: Performed by: SPECIALIST

## 2019-11-21 PROCEDURE — A4338 INDWELLING CATHETER LATEX: HCPCS | Performed by: SPECIALIST

## 2019-11-21 PROCEDURE — 502240 HCHG MISC OR SUPPLY RC 0272: Performed by: SPECIALIST

## 2019-11-21 PROCEDURE — 700104 HCHG RX REV CODE 254: Performed by: SPECIALIST

## 2019-11-21 PROCEDURE — 770006 HCHG ROOM/CARE - MED/SURG/GYN SEMI*

## 2019-11-21 PROCEDURE — 700101 HCHG RX REV CODE 250: Performed by: ANESTHESIOLOGY

## 2019-11-21 PROCEDURE — 160041 HCHG SURGERY MINUTES - EA ADDL 1 MIN LEVEL 4: Performed by: SPECIALIST

## 2019-11-21 PROCEDURE — A9270 NON-COVERED ITEM OR SERVICE: HCPCS | Performed by: SPECIALIST

## 2019-11-21 PROCEDURE — 700105 HCHG RX REV CODE 258: Performed by: SPECIALIST

## 2019-11-21 PROCEDURE — A9270 NON-COVERED ITEM OR SERVICE: HCPCS | Performed by: ANESTHESIOLOGY

## 2019-11-21 PROCEDURE — 501579 HCHG TROCAR, STEP 5MM: Performed by: SPECIALIST

## 2019-11-21 PROCEDURE — 160036 HCHG PACU - EA ADDL 30 MINS PHASE I: Performed by: SPECIALIST

## 2019-11-21 PROCEDURE — 160002 HCHG RECOVERY MINUTES (STAT): Performed by: SPECIALIST

## 2019-11-21 PROCEDURE — 0TJB8ZZ INSPECTION OF BLADDER, VIA NATURAL OR ARTIFICIAL OPENING ENDOSCOPIC: ICD-10-PCS | Performed by: SPECIALIST

## 2019-11-21 PROCEDURE — 0UT94ZZ RESECTION OF UTERUS, PERCUTANEOUS ENDOSCOPIC APPROACH: ICD-10-PCS | Performed by: SPECIALIST

## 2019-11-21 PROCEDURE — 160048 HCHG OR STATISTICAL LEVEL 1-5: Performed by: SPECIALIST

## 2019-11-21 PROCEDURE — 700101 HCHG RX REV CODE 250: Performed by: SPECIALIST

## 2019-11-21 PROCEDURE — 160035 HCHG PACU - 1ST 60 MINS PHASE I: Performed by: SPECIALIST

## 2019-11-21 PROCEDURE — 700102 HCHG RX REV CODE 250 W/ 637 OVERRIDE(OP): Performed by: SPECIALIST

## 2019-11-21 PROCEDURE — 0UT74ZZ RESECTION OF BILATERAL FALLOPIAN TUBES, PERCUTANEOUS ENDOSCOPIC APPROACH: ICD-10-PCS | Performed by: SPECIALIST

## 2019-11-21 RX ORDER — ACETAMINOPHEN 500 MG
1000 TABLET ORAL EVERY 6 HOURS
Status: DISCONTINUED | OUTPATIENT
Start: 2019-11-21 | End: 2019-11-22 | Stop reason: HOSPADM

## 2019-11-21 RX ORDER — HYDROMORPHONE HYDROCHLORIDE 1 MG/ML
0.4 INJECTION, SOLUTION INTRAMUSCULAR; INTRAVENOUS; SUBCUTANEOUS
Status: DISCONTINUED | OUTPATIENT
Start: 2019-11-21 | End: 2019-11-21 | Stop reason: HOSPADM

## 2019-11-21 RX ORDER — LIDOCAINE HYDROCHLORIDE 20 MG/ML
INJECTION, SOLUTION EPIDURAL; INFILTRATION; INTRACAUDAL; PERINEURAL PRN
Status: DISCONTINUED | OUTPATIENT
Start: 2019-11-21 | End: 2019-11-21 | Stop reason: SURG

## 2019-11-21 RX ORDER — BUPIVACAINE HYDROCHLORIDE AND EPINEPHRINE 2.5; 5 MG/ML; UG/ML
INJECTION, SOLUTION EPIDURAL; INFILTRATION; INTRACAUDAL; PERINEURAL
Status: COMPLETED
Start: 2019-11-21 | End: 2019-11-21

## 2019-11-21 RX ORDER — HYDROMORPHONE HYDROCHLORIDE 1 MG/ML
0.1 INJECTION, SOLUTION INTRAMUSCULAR; INTRAVENOUS; SUBCUTANEOUS
Status: DISCONTINUED | OUTPATIENT
Start: 2019-11-21 | End: 2019-11-21 | Stop reason: HOSPADM

## 2019-11-21 RX ORDER — BUPIVACAINE HYDROCHLORIDE AND EPINEPHRINE 2.5; 5 MG/ML; UG/ML
INJECTION, SOLUTION EPIDURAL; INFILTRATION; INTRACAUDAL; PERINEURAL
Status: DISCONTINUED | OUTPATIENT
Start: 2019-11-21 | End: 2019-11-21 | Stop reason: HOSPADM

## 2019-11-21 RX ORDER — HALOPERIDOL 5 MG/ML
1 INJECTION INTRAMUSCULAR EVERY 6 HOURS PRN
Status: DISCONTINUED | OUTPATIENT
Start: 2019-11-21 | End: 2019-11-22 | Stop reason: HOSPADM

## 2019-11-21 RX ORDER — ONDANSETRON 2 MG/ML
4 INJECTION INTRAMUSCULAR; INTRAVENOUS EVERY 4 HOURS PRN
Status: DISCONTINUED | OUTPATIENT
Start: 2019-11-21 | End: 2019-11-22 | Stop reason: HOSPADM

## 2019-11-21 RX ORDER — MIDAZOLAM HYDROCHLORIDE 1 MG/ML
INJECTION INTRAMUSCULAR; INTRAVENOUS PRN
Status: DISCONTINUED | OUTPATIENT
Start: 2019-11-21 | End: 2019-11-21 | Stop reason: SURG

## 2019-11-21 RX ORDER — DIPHENHYDRAMINE HYDROCHLORIDE 50 MG/ML
25 INJECTION INTRAMUSCULAR; INTRAVENOUS EVERY 6 HOURS PRN
Status: DISCONTINUED | OUTPATIENT
Start: 2019-11-21 | End: 2019-11-22 | Stop reason: HOSPADM

## 2019-11-21 RX ORDER — HYDROMORPHONE HYDROCHLORIDE 2 MG/ML
INJECTION, SOLUTION INTRAMUSCULAR; INTRAVENOUS; SUBCUTANEOUS PRN
Status: DISCONTINUED | OUTPATIENT
Start: 2019-11-21 | End: 2019-11-21 | Stop reason: SURG

## 2019-11-21 RX ORDER — KETOROLAC TROMETHAMINE 30 MG/ML
30 INJECTION, SOLUTION INTRAMUSCULAR; INTRAVENOUS EVERY 6 HOURS
Status: DISCONTINUED | OUTPATIENT
Start: 2019-11-21 | End: 2019-11-22 | Stop reason: HOSPADM

## 2019-11-21 RX ORDER — ONDANSETRON 2 MG/ML
INJECTION INTRAMUSCULAR; INTRAVENOUS PRN
Status: DISCONTINUED | OUTPATIENT
Start: 2019-11-21 | End: 2019-11-21 | Stop reason: SURG

## 2019-11-21 RX ORDER — HYDROMORPHONE HYDROCHLORIDE 1 MG/ML
0.2 INJECTION, SOLUTION INTRAMUSCULAR; INTRAVENOUS; SUBCUTANEOUS
Status: DISCONTINUED | OUTPATIENT
Start: 2019-11-21 | End: 2019-11-21 | Stop reason: HOSPADM

## 2019-11-21 RX ORDER — DIPHENHYDRAMINE HYDROCHLORIDE 50 MG/ML
12.5 INJECTION INTRAMUSCULAR; INTRAVENOUS
Status: DISCONTINUED | OUTPATIENT
Start: 2019-11-21 | End: 2019-11-21 | Stop reason: HOSPADM

## 2019-11-21 RX ORDER — BISACODYL 10 MG
10 SUPPOSITORY, RECTAL RECTAL
Status: DISCONTINUED | OUTPATIENT
Start: 2019-11-21 | End: 2019-11-22 | Stop reason: HOSPADM

## 2019-11-21 RX ORDER — ONDANSETRON 2 MG/ML
4 INJECTION INTRAMUSCULAR; INTRAVENOUS
Status: DISCONTINUED | OUTPATIENT
Start: 2019-11-21 | End: 2019-11-21 | Stop reason: HOSPADM

## 2019-11-21 RX ORDER — POLYETHYLENE GLYCOL 3350 17 G/17G
1 POWDER, FOR SOLUTION ORAL 2 TIMES DAILY PRN
Status: DISCONTINUED | OUTPATIENT
Start: 2019-11-21 | End: 2019-11-22 | Stop reason: HOSPADM

## 2019-11-21 RX ORDER — OXYCODONE HYDROCHLORIDE 10 MG/1
10 TABLET ORAL
Status: DISCONTINUED | OUTPATIENT
Start: 2019-11-21 | End: 2019-11-22 | Stop reason: HOSPADM

## 2019-11-21 RX ORDER — ROCURONIUM BROMIDE 10 MG/ML
INJECTION, SOLUTION INTRAVENOUS PRN
Status: DISCONTINUED | OUTPATIENT
Start: 2019-11-21 | End: 2019-11-21 | Stop reason: SURG

## 2019-11-21 RX ORDER — ENEMA 19; 7 G/133ML; G/133ML
1 ENEMA RECTAL
Status: DISCONTINUED | OUTPATIENT
Start: 2019-11-21 | End: 2019-11-22 | Stop reason: HOSPADM

## 2019-11-21 RX ORDER — HYDROMORPHONE HYDROCHLORIDE 4 MG/1
4 TABLET ORAL
Status: DISCONTINUED | OUTPATIENT
Start: 2019-11-21 | End: 2019-11-22 | Stop reason: HOSPADM

## 2019-11-21 RX ORDER — SCOLOPAMINE TRANSDERMAL SYSTEM 1 MG/1
1 PATCH, EXTENDED RELEASE TRANSDERMAL
Status: DISCONTINUED | OUTPATIENT
Start: 2019-11-21 | End: 2019-11-22 | Stop reason: HOSPADM

## 2019-11-21 RX ORDER — SODIUM CHLORIDE, SODIUM LACTATE, POTASSIUM CHLORIDE, CALCIUM CHLORIDE 600; 310; 30; 20 MG/100ML; MG/100ML; MG/100ML; MG/100ML
INJECTION, SOLUTION INTRAVENOUS CONTINUOUS
Status: DISCONTINUED | OUTPATIENT
Start: 2019-11-21 | End: 2019-11-21 | Stop reason: HOSPADM

## 2019-11-21 RX ORDER — DEXAMETHASONE SODIUM PHOSPHATE 4 MG/ML
4 INJECTION, SOLUTION INTRA-ARTICULAR; INTRALESIONAL; INTRAMUSCULAR; INTRAVENOUS; SOFT TISSUE
Status: DISCONTINUED | OUTPATIENT
Start: 2019-11-21 | End: 2019-11-22 | Stop reason: HOSPADM

## 2019-11-21 RX ORDER — OXYCODONE HCL 5 MG/5 ML
5 SOLUTION, ORAL ORAL
Status: COMPLETED | OUTPATIENT
Start: 2019-11-21 | End: 2019-11-21

## 2019-11-21 RX ORDER — MEPERIDINE HYDROCHLORIDE 25 MG/ML
12.5 INJECTION INTRAMUSCULAR; INTRAVENOUS; SUBCUTANEOUS
Status: DISCONTINUED | OUTPATIENT
Start: 2019-11-21 | End: 2019-11-21 | Stop reason: HOSPADM

## 2019-11-21 RX ORDER — SODIUM CHLORIDE, SODIUM LACTATE, POTASSIUM CHLORIDE, CALCIUM CHLORIDE 600; 310; 30; 20 MG/100ML; MG/100ML; MG/100ML; MG/100ML
INJECTION, SOLUTION INTRAVENOUS CONTINUOUS
Status: ACTIVE | OUTPATIENT
Start: 2019-11-21 | End: 2019-11-21

## 2019-11-21 RX ORDER — OXYCODONE HCL 5 MG/5 ML
10 SOLUTION, ORAL ORAL
Status: COMPLETED | OUTPATIENT
Start: 2019-11-21 | End: 2019-11-21

## 2019-11-21 RX ORDER — MORPHINE SULFATE 10 MG/ML
6 INJECTION, SOLUTION INTRAMUSCULAR; INTRAVENOUS
Status: DISCONTINUED | OUTPATIENT
Start: 2019-11-21 | End: 2019-11-22 | Stop reason: HOSPADM

## 2019-11-21 RX ORDER — HALOPERIDOL 5 MG/ML
1 INJECTION INTRAMUSCULAR
Status: DISCONTINUED | OUTPATIENT
Start: 2019-11-21 | End: 2019-11-21 | Stop reason: HOSPADM

## 2019-11-21 RX ORDER — AMOXICILLIN 250 MG
1 CAPSULE ORAL
Status: DISCONTINUED | OUTPATIENT
Start: 2019-11-21 | End: 2019-11-22 | Stop reason: HOSPADM

## 2019-11-21 RX ORDER — SIMETHICONE 80 MG
80 TABLET,CHEWABLE ORAL EVERY 8 HOURS PRN
Status: DISCONTINUED | OUTPATIENT
Start: 2019-11-21 | End: 2019-11-22 | Stop reason: HOSPADM

## 2019-11-21 RX ORDER — CEFAZOLIN SODIUM 1 G/3ML
INJECTION, POWDER, FOR SOLUTION INTRAMUSCULAR; INTRAVENOUS PRN
Status: DISCONTINUED | OUTPATIENT
Start: 2019-11-21 | End: 2019-11-21 | Stop reason: SURG

## 2019-11-21 RX ORDER — DOCUSATE SODIUM 100 MG/1
100 CAPSULE, LIQUID FILLED ORAL 2 TIMES DAILY
Status: DISCONTINUED | OUTPATIENT
Start: 2019-11-21 | End: 2019-11-22 | Stop reason: HOSPADM

## 2019-11-21 RX ORDER — LABETALOL HYDROCHLORIDE 5 MG/ML
5 INJECTION, SOLUTION INTRAVENOUS
Status: DISCONTINUED | OUTPATIENT
Start: 2019-11-21 | End: 2019-11-21 | Stop reason: HOSPADM

## 2019-11-21 RX ORDER — HYDRALAZINE HYDROCHLORIDE 20 MG/ML
5 INJECTION INTRAMUSCULAR; INTRAVENOUS
Status: DISCONTINUED | OUTPATIENT
Start: 2019-11-21 | End: 2019-11-21 | Stop reason: HOSPADM

## 2019-11-21 RX ORDER — AMOXICILLIN 250 MG
1 CAPSULE ORAL NIGHTLY
Status: DISCONTINUED | OUTPATIENT
Start: 2019-11-21 | End: 2019-11-22 | Stop reason: HOSPADM

## 2019-11-21 RX ADMIN — FENTANYL CITRATE 50 MCG: 50 INJECTION, SOLUTION INTRAMUSCULAR; INTRAVENOUS at 08:38

## 2019-11-21 RX ADMIN — FENTANYL CITRATE 50 MCG: 50 INJECTION, SOLUTION INTRAMUSCULAR; INTRAVENOUS at 08:34

## 2019-11-21 RX ADMIN — CEFAZOLIN 2 G: 330 INJECTION, POWDER, FOR SOLUTION INTRAMUSCULAR; INTRAVENOUS at 07:43

## 2019-11-21 RX ADMIN — OXYCODONE HYDROCHLORIDE 10 MG: 10 TABLET ORAL at 23:38

## 2019-11-21 RX ADMIN — DIPHENHYDRAMINE HYDROCHLORIDE 12.5 MG: 50 INJECTION INTRAMUSCULAR; INTRAVENOUS at 11:02

## 2019-11-21 RX ADMIN — SIMETHICONE CHEW TAB 80 MG 80 MG: 80 TABLET ORAL at 20:04

## 2019-11-21 RX ADMIN — FENTANYL CITRATE 100 MCG: 50 INJECTION, SOLUTION INTRAMUSCULAR; INTRAVENOUS at 07:36

## 2019-11-21 RX ADMIN — OXYCODONE HYDROCHLORIDE 10 MG: 10 TABLET ORAL at 17:17

## 2019-11-21 RX ADMIN — HYDROMORPHONE HYDROCHLORIDE 0.4 MG: 1 INJECTION, SOLUTION INTRAMUSCULAR; INTRAVENOUS; SUBCUTANEOUS at 13:30

## 2019-11-21 RX ADMIN — FENTANYL CITRATE 50 MCG: 50 INJECTION, SOLUTION INTRAMUSCULAR; INTRAVENOUS at 08:11

## 2019-11-21 RX ADMIN — MIDAZOLAM 2 MG: 1 INJECTION INTRAMUSCULAR; INTRAVENOUS at 07:30

## 2019-11-21 RX ADMIN — HYDROMORPHONE HYDROCHLORIDE 4 MG: 4 TABLET ORAL at 20:04

## 2019-11-21 RX ADMIN — ACETAMINOPHEN 1000 MG: 500 TABLET ORAL at 17:24

## 2019-11-21 RX ADMIN — KETOROLAC TROMETHAMINE 30 MG: 30 INJECTION, SOLUTION INTRAMUSCULAR at 23:38

## 2019-11-21 RX ADMIN — FENTANYL CITRATE 50 MCG: 50 INJECTION, SOLUTION INTRAMUSCULAR; INTRAVENOUS at 10:56

## 2019-11-21 RX ADMIN — HYDROMORPHONE HYDROCHLORIDE 0.4 MG: 1 INJECTION, SOLUTION INTRAMUSCULAR; INTRAVENOUS; SUBCUTANEOUS at 13:00

## 2019-11-21 RX ADMIN — MEPERIDINE HYDROCHLORIDE 12.5 MG: 25 INJECTION INTRAMUSCULAR; INTRAVENOUS; SUBCUTANEOUS at 11:00

## 2019-11-21 RX ADMIN — SODIUM CHLORIDE, POTASSIUM CHLORIDE, SODIUM LACTATE AND CALCIUM CHLORIDE: 600; 310; 30; 20 INJECTION, SOLUTION INTRAVENOUS at 10:25

## 2019-11-21 RX ADMIN — SODIUM CHLORIDE, POTASSIUM CHLORIDE, SODIUM LACTATE AND CALCIUM CHLORIDE: 600; 310; 30; 20 INJECTION, SOLUTION INTRAVENOUS at 06:33

## 2019-11-21 RX ADMIN — HYDROMORPHONE HYDROCHLORIDE 0.2 MG: 1 INJECTION, SOLUTION INTRAMUSCULAR; INTRAVENOUS; SUBCUTANEOUS at 13:05

## 2019-11-21 RX ADMIN — OXYCODONE HYDROCHLORIDE 10 MG: 5 SOLUTION ORAL at 10:56

## 2019-11-21 RX ADMIN — KETOROLAC TROMETHAMINE 30 MG: 30 INJECTION, SOLUTION INTRAMUSCULAR at 17:17

## 2019-11-21 RX ADMIN — HYDROMORPHONE HYDROCHLORIDE 0.2 MG: 1 INJECTION, SOLUTION INTRAMUSCULAR; INTRAVENOUS; SUBCUTANEOUS at 15:10

## 2019-11-21 RX ADMIN — ROCURONIUM BROMIDE 10 MG: 10 INJECTION, SOLUTION INTRAVENOUS at 09:05

## 2019-11-21 RX ADMIN — ROCURONIUM BROMIDE 10 MG: 10 INJECTION, SOLUTION INTRAVENOUS at 08:35

## 2019-11-21 RX ADMIN — FENTANYL CITRATE 100 MCG: 50 INJECTION, SOLUTION INTRAMUSCULAR; INTRAVENOUS at 10:19

## 2019-11-21 RX ADMIN — ONDANSETRON 4 MG: 2 INJECTION INTRAMUSCULAR; INTRAVENOUS at 10:25

## 2019-11-21 RX ADMIN — HYDROMORPHONE HYDROCHLORIDE 0.4 MG: 1 INJECTION, SOLUTION INTRAMUSCULAR; INTRAVENOUS; SUBCUTANEOUS at 15:00

## 2019-11-21 RX ADMIN — ROCURONIUM BROMIDE 40 MG: 10 INJECTION, SOLUTION INTRAVENOUS at 07:36

## 2019-11-21 RX ADMIN — HYDROMORPHONE HYDROCHLORIDE 0.4 MG: 1 INJECTION, SOLUTION INTRAMUSCULAR; INTRAVENOUS; SUBCUTANEOUS at 12:55

## 2019-11-21 RX ADMIN — HYDROMORPHONE HYDROCHLORIDE 1 MG: 2 INJECTION, SOLUTION INTRAMUSCULAR; INTRAVENOUS; SUBCUTANEOUS at 10:21

## 2019-11-21 RX ADMIN — PROPOFOL 150 MG: 10 INJECTION, EMULSION INTRAVENOUS at 07:36

## 2019-11-21 RX ADMIN — ONDANSETRON 4 MG: 2 INJECTION INTRAMUSCULAR; INTRAVENOUS at 17:12

## 2019-11-21 RX ADMIN — FENTANYL CITRATE 50 MCG: 50 INJECTION, SOLUTION INTRAMUSCULAR; INTRAVENOUS at 11:30

## 2019-11-21 RX ADMIN — MEPERIDINE HYDROCHLORIDE 12.5 MG: 25 INJECTION INTRAMUSCULAR; INTRAVENOUS; SUBCUTANEOUS at 11:05

## 2019-11-21 RX ADMIN — FENTANYL CITRATE 50 MCG: 50 INJECTION, SOLUTION INTRAMUSCULAR; INTRAVENOUS at 08:49

## 2019-11-21 RX ADMIN — ACETAMINOPHEN 1000 MG: 500 TABLET ORAL at 23:38

## 2019-11-21 RX ADMIN — LIDOCAINE HYDROCHLORIDE 40 MG: 20 INJECTION, SOLUTION EPIDURAL; INFILTRATION; INTRACAUDAL at 07:36

## 2019-11-21 RX ADMIN — SODIUM CHLORIDE, POTASSIUM CHLORIDE, SODIUM LACTATE AND CALCIUM CHLORIDE: 600; 310; 30; 20 INJECTION, SOLUTION INTRAVENOUS at 08:45

## 2019-11-21 ASSESSMENT — COGNITIVE AND FUNCTIONAL STATUS - GENERAL
MOBILITY SCORE: 24
SUGGESTED CMS G CODE MODIFIER MOBILITY: CH
DAILY ACTIVITIY SCORE: 24
SUGGESTED CMS G CODE MODIFIER DAILY ACTIVITY: CH

## 2019-11-21 ASSESSMENT — COPD QUESTIONNAIRES
DO YOU EVER COUGH UP ANY MUCUS OR PHLEGM?: NO/ONLY WITH OCCASIONAL COLDS OR INFECTIONS
DURING THE PAST 4 WEEKS HOW MUCH DID YOU FEEL SHORT OF BREATH: SOME OF THE TIME
COPD SCREENING SCORE: 1
HAVE YOU SMOKED AT LEAST 100 CIGARETTES IN YOUR ENTIRE LIFE: NO/DON'T KNOW
IN THE PAST 12 MONTHS DO YOU DO LESS THAN YOU USED TO BECAUSE OF YOUR BREATHING PROBLEMS: DISAGREE/UNSURE

## 2019-11-21 ASSESSMENT — PAIN SCALES - GENERAL: PAIN_LEVEL: 5

## 2019-11-21 NOTE — ANESTHESIA POSTPROCEDURE EVALUATION
Patient: Yasemin Bradley    Procedure Summary     Date:  11/21/19 Room / Location:  [facility] ROOM 23 / SURGERY SAME DAY [facility]    Anesthesia Start:  0730 Anesthesia Stop:  1039    Procedures:       HYSTERECTOMY, LAPAROSCOPIC (N/A Abdomen)      SALPINGECTOMY (Bilateral Abdomen)      CYSTOSCOPY (N/A Bladder) Diagnosis:  (ENDOMETRIOSIS, MENORRHAGIA, DYSMENORRHEA)    Surgeon:  Jericho Du M.D. Responsible Provider:  Thomas Mack M.D.    Anesthesia Type:  general ASA Status:  2          Final Anesthesia Type: general  Last vitals  BP   Blood Pressure: 109/69    Temp   36.4 °C (97.5 °F)    Pulse   Pulse: 63   Resp   (!) 0    SpO2   100 %      Anesthesia Post Evaluation    Patient location during evaluation: PACU  Patient participation: complete - patient participated  Level of consciousness: awake and alert  Pain score: 5    Airway patency: patent  Anesthetic complications: no  Cardiovascular status: hemodynamically stable  Respiratory status: acceptable  Hydration status: euvolemic    PONV: none           Nurse Pain Score: 5 (NPRS)

## 2019-11-21 NOTE — ANESTHESIA PREPROCEDURE EVALUATION
Relevant Problems   No relevant active problems       Physical Exam    Airway   Mallampati: II  TM distance: <3 FB  Neck ROM: full       Cardiovascular - normal exam  Rhythm: regular  Rate: normal  (-) murmur     Dental - normal exam           Pulmonary - normal exam  Breath sounds clear to auscultation     Abdominal    Neurological - normal exam                 Anesthesia Plan    ASA 2       Plan - general       Airway plan will be ETT        Induction: intravenous    Postoperative Plan: Postoperative administration of opioids is intended.    Pertinent diagnostic labs and testing reviewed    Informed Consent:    Anesthetic plan and risks discussed with patient.    Use of blood products discussed with: patient whom consented to blood products.

## 2019-11-21 NOTE — ANESTHESIA TIME REPORT
Anesthesia Start and Stop Event Times     Date Time Event    11/21/2019 0708 Ready for Procedure     0730 Anesthesia Start     1039 Anesthesia Stop        Responsible Staff  11/21/19    Name Role Begin End    Thomas Mack M.D. Anesth 0730 1039        Preop Diagnosis (Free Text):  Pre-op Diagnosis     ENDOMETRIOSIS, MENORRHAGIA, DYSMENORRHEA        Preop Diagnosis (Codes):    Post op Diagnosis  Menorrhagia      Premium Reason  Non-Premium    Comments:

## 2019-11-21 NOTE — H&P
Yasemin Bradley          YOB: 1985  Date of today's surgery:   Facility: [facility]    ID: The patient is a very pleasant 34-year-old primipara (para-1, and she has had 1 previous  section).    Chief Complaint: The patient complains of menorrhagia and dysmenorrhea.    History of Present Illness: The patient underwent a laparoscopy with labs comic bilateral tubal ligation and hysteroscopy, D&C, and hydrothermal endometrial ablation on July 10, 2017.  Despite having had endometrial ablation she is having menorrhagia and dysmenorrhea.  She wishes to proceed with hysterectomy.  Of note she also complains of premenstrual syndrome like symptoms including mood lability and irritability and that she puts it “feeling bloated” during the day surrounding the onset of her menses.  She has in the past taken Lupron.  She wishes to proceed with hysterectomy.  She scheduled today to have a total laparoscopic hysterectomy, bilateral salpingectomy, followed by cystoscopy.  I have discussed with her and explained to her in detail and at length what total laparoscopic hysterectomy, bilateral salpingectomy, followed by cystoscopy, is, and what total endoscopic hysterectomy, bilateral salpingectomy, followed by cystoscopy, involves, and I have discussed with her and explained to her in detail and at length the risks and benefits and alternatives of total laparoscopic hysterectomy, bilateral salpingectomy, followed by cystoscopy.  After our discussions and after answering her questions she told me that she very much wishes for us to proceed with total laparoscopic hysterectomy, bilateral salpingectomy, followed by cystoscopy.    Past Medical History: The patient tells me that in the past she had had occasional PVCs and PACs.  She says she has had arthritis and low back pain.  She says she has no other medical illnesses or conditions.    Past Surgical History: The  patient had a tonsillectomy and 1999.  She had a  in .  On Monday, July 10, 2017 she had a laparoscopy with laparoscopic bilateral tubal ligation and hysteroscopy, D&C, and hydrothermal endometrial ablation.  She says also that in the past she has had lumbar surgery and lumbar nerve ablation.    Medications: The patient says that she take Zoloft 100 milligrams daily.    Allergies: The patient says that she has no known drug allergies.    Social History: The patient quit smoking several years ago.  She denies consuming alcoholic beverages.  She denies the use of recreational drugs except for having occasional use marijuana.    Family History: The patient says that her mother has diabetes.    Review of Systems  General: The patient denies any fevers, chills, sweats.  Pulmonary: The patient denies any coughing, wheezing, chest pain, shortness of breath.  Cardiovascular: The patient denies any palpitations, dyspnea, chest pain.  Gastrointestinal: The patient denies any nausea, vomiting, diarrhea, constipation, hematochezia, melena, history of hepatitis, history of jaundice.  Genitourinary: The patient complains of menorrhagia and dysmenorrhea  Musculoskeletal: The patient does complain of low back pain.  She says she has no other arthralgias or myalgias.   Neurological: No headaches or syncope or seizures.     Physical Exam:   Vital Signs: The patient's vital signs are stable and she is afebrile.  General: The patient appears well developed and well nourished and relaxed and alert and comfortable and in no apparent distress.    HEENT :  Normo-cephalic, atraumatic, pupils equal, round, reactive to light and accommodation, extra ocular motions intact, pharynx clear; there is no thyromegaly. There is no cervical lymphadenopathy.  Chest: Heart regular rate and rhythm, with no murmurs or rubs or gallops; the lungs are clear to auscultation bilaterally.  Abdomen: The abdomen is obese and soft and non-tender and  non-distended. There is no hepatomegaly. There is no splenomegaly.  There is a Pfannenstiel scar.  Pelvic: Speculum exam reveals no vulvar or vaginal or cervical lesions and no cervical or vaginal discharge.  Bimanual exam reveals perhaps diffuse pelvic tenderness but no evidence of specific cervical motion tenderness and no evidence of specific tenderness to palpation of the uterine corpus and no adnexal masses or tenderness either on the right or the left and no evidence of uterine enlargement.  Extremities: No clubbing or cyanosis or edema.   Neurological: non-focal.     Assessment:   1.)  Menorrhagia despite previous endometrial ablation.  2.)  Dysmenorrhea.  3.)  It is possible that the patient has endometriosis.  4.)  Premenstrual syndrome.    Plan:   We will proceed today with total endoscopic hysterectomy, bilateral salpingectomy, followed by cystoscopy.  Please see above.            ________________________  Jericho Du M.D.

## 2019-11-21 NOTE — OP REPORT
DATE OF SERVICE:  11/21/2019    PREOPERATIVE DIAGNOSES:  1.  Menorrhagia despite previous endometrial ablation.  2.  Dysmenorrhea.  3.  It is possible that the patient has endometriosis.  4.  Premenstrual syndrome.    POSTOPERATIVE DIAGNOSES:  1.  Menorrhagia despite previous endometrial ablation.  2.  Dysmenorrhea.  3.  It is possible that the patient has endometriosis.  4.  Premenstrual syndrome.    PROCEDURES:  Total laparoscopic hysterectomy, bilateral salpingectomy,   followed by cystoscopy.    SURGEON:  Jericho Du MD    ASSISTANT:  Anitha Reis MD    ANESTHESIA:  General endotracheal tube anesthesia.    ANESTHESIOLOGIST:  Thomas Mack MD    FINDINGS:  Speculum exam under anesthesia reveals that there are no vulvar,   vaginal, or cervical lesions.  The cervix is well visualized and appears   nulliparous.  At laparoscopy, evidence of previous bilateral tubal ligation is   seen.  Serosal surfaces of the uterus are normal.  Both ovaries are normal.    Both ovarian fossae are normal.  The cul-de-sac is normal.  The liver edge   appears normal.  When cystoscopy is performed immediately following total   laparoscopic hysterectomy, both ureteral ostia are clearly identified and   bilateral vigorous jets of blue urine are seen indicating that both ureters   are patent.  During cystoscopy, the dome of the bladder was examined and found   to be normal.    SPECIMENS:  Uterus with both distal fallopian tubes.    COMPLICATIONS:  None.    ESTIMATED BLOOD LOSS:  Less than 100 mL    DESCRIPTION OF PROCEDURE:  After the appropriate consents have been obtained,   the patient was taken to the operating room and given general anesthesia.  She   was prepped and draped in the dorsal lithotomy position and a Patel catheter   was noted to be in place and draining urine.  Speculum exam was performed and   reveals that there are no vulvar, vaginal, or cervical lesions.  The cervix   was well visualized and found to be  nulliparous.  The anterior aspect of the   cervix was grasped with a single-tooth tenaculum.  The cervix was dilated with   Hanks dilators.  A Vicryl suture was placed at the 12 o'clock position of the   cervix in usual fashion.  A second Vicryl suture was placed in the cervix,   this one at the 6 o'clock position of the cervix in the usual fashion.  A   medium VCare uterine manipulator was advanced through the endocervical canal   into the intrauterine cavity and the balloon at the tip of the VCare uterine   manipulator was inflated with several milliliters of air.  The single-tooth   tenaculum was removed from the cervix.  The needle on the Vicryl suture at the   12 o'clock position of the cervix was cut off and set aside and the 2 ends of   the Vicryl suture at the 12 o'clock position of the cervix were placed   through 2 adjacent holes on one side of the inner cup of the VCare uterine   manipulator.  The two ends of the Vicryl suture at the 6 o'clock position of   the cervix were placed through 2 adjacent holes on the opposite side of the   inner cup of the VCare uterine manipulator and the needle on the Vicryl suture   at the 6 o'clock position of the cervix was cut off and set aside.  The   speculum was removed.  The inner cup of the VCare uterine manipulator was   advanced along the shaft of the VCare uterine manipulator into the vaginal   vault and pressed against the cervix.  The two ends of the Vicryl suture at   the 12 o'clock position of the cervix were tied several times and excess   Vicryl suture was cut off.  The two ends of the Vicryl suture at the 6 o'clock   position of the cervix were tied several times and excess Vicryl suture was   cut off.  The inner cup of the VCare uterine manipulator was thus nicely   secured against the cervix in this way.  The blue portion of the VCare uterine   manipulator was advanced along the shaft of the VCare uterine manipulator   into the vaginal vault and secured  in the usual fashion.  The 's   gloves were changed.  Attention was then directed to the abdomen where a small   (approximately 1 cm) horizontal infraumbilical incision was made with a   scalpel after the overlying skin and subcutaneous tissues have been   infiltrated with local anesthetic.  A Veress needle was advanced through this   incision into the peritoneal cavity and proper placement in the peritoneal   cavity was verified with a palmar hanging drop technique.  The peritoneal   cavity was then insufflated with approximately 2-3 liters of carbon dioxide   gas.  The Veress needle was removed and a 10-12 mm port was introduced through   the infraumbilical incision into the peritoneal cavity utilizing the   VersaStep trocar system.  The central portion of this port was removed and the   10 mm 0-degree laparoscope was inserted through the remaining sleeve and   proper entry in the peritoneal cavity was verified visually with laparoscope.    The patient was placed in Trendelenburg position.  The uterus was mobilized   and findings are as noted above.  A 10-12 mm port was placed in left lower   quadrant under direct laparoscopic visualization after the overlying skin and   subcutaneous tissues have been infiltrated with local anesthetic and utilizing   the VersaStep trocar technique.  A 10-12 mm port was placed in the right   lower quadrant in the usual fashion after the overlying skin and subcutaneous   tissues have been infiltrated with local anesthetic and utilizing the   VersaStep trocar technique.  A 10-12 mm port was placed suprapubically under   direct laparoscopic visualization after the overlying skin and subcutaneous   tissues have been infiltrated with local anesthetic and utilizing the   VersaStep trocar technique.  The patient was placed in further Trendelenburg   position and the uterus was further manipulated and again findings are as   noted above and pictures were taken.  The 10 mm 0-degree  laparoscope was   exchanged for the 10 mm 30-degree laparoscope.  This laparoscope during the   procedure was placed not only through the infraumbilical port, but at times   through the right lower quadrant port and other times through the left lower   quadrant port.  The 5 mm laparoscopic LigaSure bipolar cutting device with the   monopolar cautery element was used during this procedure.  The LigaSure   instrument was used to thoroughly cauterize, seal, and cut the right proximal   round ligament.  The LigaSure instrument was used to thoroughly cauterize,   seal, and cut the right proper ovarian ligament.  LigaSure instrument was used   to serially thoroughly cauterize, seal, and cut the mesosalpinx under the   proximal remnant of the right fallopian tube.  The tissue in between the   incision in the right proximal round ligament and the right proper ovarian   ligament was thoroughly cauterized, sealed, and cut with LigaSure instrument.    The most proximal portion of the right broad ligament including anterior and   posterior leaves including ipsilateral ascending branches, the uterine vessels   were serially thoroughly cauterized, sealed, and cut with the LigaSure   instrument.  Remnants of anterior and posterior leaves of the right broad   ligament were dissected out and found to nicely expose uterine vessels on the   right, which once exposed were thoroughly cauterized and sealed with the   LigaSure instrument.  Attention was then directed to the left side of the   uterus.  The left proximal round ligament was thoroughly cauterized, sealed,   and cut with LigaSure instrument.  The left proper ovarian ligament was   thoroughly cauterized, sealed, and cut with LigaSure instrument.  The tissue   in between the incision in the left proximal round ligament and left proper   ovarian ligament was thoroughly cauterized, sealed, and cut with LigaSure   instrument.  Most proximal portion of the left broad ligament  including   anterior and posterior leaves and including ipsilateral ascending branches of   uterine vessels were serially thoroughly cauterized, sealed, and cut with   LigaSure instrument.  Remnants of anterior and posterior leaves of the   laparotomy were dissected out bluntly and once dissected out bluntly, the   uterine vessels on the left were nicely exposed and once nicely exposed   thoroughly cauterized, sealed, and cut with LigaSure instrument.  The serosa   overlying the anterior lower uterine segment was incised from left to right   using a monopolar cautery with monopolar cautery instrument with the LigaSure   instrument.  The bladder was then dissected away in the usual fashion bluntly   and also at times with monopolar cautery and also with the bipolar cautery   with the LigaSure instrument.  Uterosacral cardinal ligament complexes were   thoroughly cauterized, sealed, and cut, bilaterally at their insertions into   the uterus with the LigaSure instrument.  The bladder was further dissected   away anteriorly, nicely and thoroughly.  At this time, the junction between   the cervix and vagina was nicely appreciated, thanks to the inner lip of the   inner cup of the VCare uterine manipulator and the tissue overlying this inner   lip of the inner cup of the VCare uterine manipulator circumferentially   incised at times with monopolar cautery and other times with the bipolar   cautery with the LigaSure instrument.  Once this process was completed, the   uterus has no more attachments to the rest of the body and was left in the   vaginal vault at this time, so as to maintain pneumoperitoneum.  The vaginal   cuff was then closed in the usual fashion using intracorporeal suturing   techniques and extracorporeal knot tying technique with 0 Vicryl and CT-1   needles.  First, both the angle sutures were placed.  The angle suture on the   right was placed first.  Using intracorporeal suturing technique and    extracorporeal knot technique, a figure-of-eight suture was placed on the   right angle of the vaginal cuff and the needle was cut off and the sutures   were then brought out through the right lower quadrant port and   tension/traction was placed on these sutures to place tension/traction on the   right angle of the vaginal cuff in order to enhance exposure of the vaginal   cuff, so as to facilitate closure of the vaginal cuff.  The same was then   performed on the left.  A figure-of-eight suture was placed in the left   vaginal cuff using intracorporeal suturing technique and extracorporeal knot   tying technique and once needles cut off, the 2 ends of the suture were   brought out through the left lower quadrant port and tension was   tension/traction was placed on these sutures to place tension/traction on the   left angle vaginal cuff in order to enhance exposure of the vaginal cuff, so   as to facilitate closure of the vaginal cuff.  Then, several interrupted   figure-of-eight sutures were placed along the length of the vaginal cuff in   the usual fashion between the 2 angle sutures and the vaginal cuff was closed   nicely in this way.  Excellent hemostasis was observed.  Next, the distal   right fallopian tube was resected (left salpingectomy was performed) in the   usual fashion using the LigaSure instrument and excellent hemostasis was   observed and distal right fallopian tube was submitted as a specimen.  It   should be noted that immediately after closure of the vaginal cuff, the   anesthesiologist at my request gave the patient indigo carmine intravenously.    It should be noted that prior to finishing laparoscopy blue dye was seen in   the Patel catheter and it should be noted that at no point during laparoscopy   was any blue dye seen in the operative field.  Next, the left distal fallopian   tube was resected (left salpingectomy was performed) in the usual fashion   using the LigaSure instrument and  excellent hemostasis was observed.  The left   fallopian tube was submitted as a specimen.  The pelvis was copiously   irrigated and drained.  Hemostasis was noted to be excellent.  Both ovaries   are examined and noted to be normal.  Both ovarian fossae were examined and   noted to be normal.  The liver edge was examined and noted to be normal.  The   patient was taken out of Trendelenburg position and the laparoscope was   removed and air was allowed to evacuate the peritoneal cavity and all ports   were removed.  The fascia underneath the infraumbilical incision was   identified with the use of S retractors and reapproximated with placement of   simple interrupted suture using Vicryl.  The fascia underneath the suprapubic   incision was identified with the use of S retractors and reapproximated with   placement of simple interrupted suture using Vicryl.  Skin incisions were   reapproximated with placement of multiple interrupted buried sutures of 4-0   Monocryl placed in the dermis.  The uterus was removed from the vaginal vault   and submitted as a specimen.  Urine in the Patel catheter was noted to be   blue.  The Patel catheter bulb was deflated and the Patel catheter was   removed.  The cystoscope was inserted through the urethra into the bladder and   cystoscopy was performed.  The right ureteral ostia was identified.  The left   ureteral ostia was then identified.  A vigorous jet of blue urine was seen   coming from the left ureteral ostia indicating that the left ureter was   patent.  Attention was then directed to the right ureteral ostia, which was   again clearly identified.  A vigorous jet of blue urine was seen coming from   the right ureteral ostia indicating the right ureter was patent.  Thus,   bilateral vigorous ureteral jets of urine (blue urine) are seen during   cystoscopy indicating that both ureters are patent.  The dome of the bladder   was examined and noted to be normal.  The cystoscope  was removed.  The Patel   catheter was replaced.  Bimanual exam was performed and the vaginal cuff was   palpated along its entire length and found to be nicely reapproximated along   its entire length.  The procedure was terminated.  Final lap and needle counts   reported to be correct x2 at the end of the procedure.  The patient tolerated   the procedure well and sent to postanesthesia recovery in stable condition.       ____________________________________     Jericho Du MD    MED / NTS    DD:  11/21/2019 11:53:33  DT:  11/21/2019 13:05:45    D#:  [id number]  Job#:  [id number]    cc: Thomas Mack MD, Anitha Reis MD

## 2019-11-21 NOTE — ANESTHESIA PROCEDURE NOTES
Airway  Date/Time: 11/21/2019 7:40 AM  Performed by: Thomas Mack M.D.  Authorized by: Thomas Mack M.D.     Location:  OR  Urgency:  Elective  Indications for Airway Management:  Anesthesia  Spontaneous Ventilation: absent    Sedation Level:  Deep  Preoxygenated: Yes    Patient Position:  Sniffing  Mask Difficulty Assessment:  1 - vent by mask  Final Airway Type:  Endotracheal airway  Final Endotracheal Airway:  ETT  Cuffed: Yes    Technique Used for Successful ETT Placement:  Direct laryngoscopy  Insertion Site:  Oral  Blade Type:  Linda  Laryngoscope Blade/Videolaryngoscope Blade Size:  3  ETT Size (mm):  7.0  Measured from:  Teeth  ETT to Teeth (cm):  24  Placement Verified by: auscultation and capnometry    Cormack-Lehane Classification:  Grade IIb - view of arytenoids or posterior of glottis only  Number of Attempts at Approach:  1

## 2019-11-21 NOTE — OR SURGEON
Immediate Post OP Note    PreOp Diagnosis:   1.)  Menorrhagia despite previous endometrial ablation.  2.)  Dysmenorrhea.  3.)  It is possible that the patient has endometriosis.  4.)  Premenstrual syndrome.    PostOp Diagnosis:   1.)  Menorrhagia despite previous endometrial ablation.  2.)  Dysmenorrhea.  3.)  It is possible that the patient has endometriosis.  4.)  Premenstrual syndrome.    Procedure(s):  HYSTERECTOMY, LAPAROSCOPIC - Wound Class: Clean Contaminated  SALPINGECTOMY - Wound Class: Clean Contaminated  CYSTOSCOPY - Wound Class: Clean Contaminated    Surgeon(s):  DANIEL Cho M.D.    Anesthesiologist/Type of Anesthesia:  Anesthesiologist: Thomas Mack M.D./General    Surgical Staff:  Circulator: Olimpia Dooley R.N.; Patti Pritchett R.N.  Relief Circulator: Romana Dc R.N.  Scrub Person: Jaguar Tejada; Mitali Carbajal    Specimens removed if any:  ID Type Source Tests Collected by Time Destination   A : Cervix, uterus, bilateral fallopian tubes Tissue Other PATHOLOGY SPECIMEN Jericho Du M.D. 11/21/2019  9:40 AM        Estimated Blood Loss:   Less than 100 cc's     Findings:   Speculum exam under anesthesia reveals that there are no vulvar or vaginal or cervical lesions. The cervix appears nulliparous.   At laparoscopy bilateral evidence of previous bilateral tubal ligation is noted.   Serosal surfaces of the uterus appear normal.   Both ovaries are normal.   Both ovarian fossae are normal.   The cul-de-sac is normal.   The liver edge is normal.   At cystoscopy both ureteral ostia are easily identified.   Bilateral ureteral jets of urine are seen indicating that both ureters are normal.   The dome of the bladder is inspected and is found to be normal.     Complications:   None.         11/21/2019 10:46 AM Jericho Du M.D.

## 2019-11-21 NOTE — OR NURSING
"1035-Received from OR via [facility]. S/P-LAVH, oral airway in place. bandaids to lap stabs clean and dry. Patel cath at bedside, urine bright blue.  Bedside report received from RN. And Anesthesiologist.    1049-airway removed    1056-fent. And oxycodone 10mg Given for pain. Tearful and shivering.    1100-shivering, complaining of pain. Demerol 12.5mg given.    1105-repeat dose of demerol 12.5mg for shivering.    1130-repeat dose of fent. 50mcg. Given for unrelieved pain. Heat packs placed on abd. Fiance' at bedside.    1200-resting soundly with eyes closed.    1255-drowsy but waking up. Complaining of abd. Pain. Dilaudid 0.4mg given. Mask changed to oxymask.    1300-yelled out \"i'm in a tremendous amount of pain\"! remedicated with 0.4mg of dilaudid.     1305-denies relief. Re medicated with dilaudid .2mg iv dilaudid.     1315-eyes closed, mouth open, snorrhing.      1400-sleeping soundly.    1415-remains asleep. Report off to Cedric Najera.    1445-Resumed care of patient.     1500-medicated for pain.     1510-re medicated for pain.    1538-called Report to CEDRIC Curiel.     1605-trans. To room in stable condition on [facility] with transport. Paula' carrying personal belongings.     "

## 2019-11-21 NOTE — ANESTHESIA QCDR
2019 [facility] Clinical Data Registry (for Quality Improvement)     Postoperative nausea/vomiting risk protocol (Adult = 18 yrs and Pediatric 3-17 yrs)- (430 and 463)  General inhalation anesthetic (NOT TIVA) with PONV risk factors: Yes  Provision of anti-emetic therapy with at least 2 different classes of agents: Yes   Patient DID NOT receive anti-emetic therapy and reason is documented in Medical Record:  N/A    Multimodal Pain Management- (AQI59)  Patient undergoing Elective Surgery (i.e. Outpatient, or ASC, or Prescheduled Surgery prior to Hospital Admission): Yes  Use of Multimodal Pain Management, two or more drugs and/or interventions, NOT including systemic opioids: Yes   Exception: Documented allergy to multiple classes of analgesics:  N/A    PACU assessment of acute postoperative pain prior to Anesthesia Care End- Applies to Patients Age = 18- (ABG7)  Initial PACU pain score is which of the following: < 7/10  Patient unable to report pain score: N/A    Post-anesthetic transfer of care checklist/protocol to PACU/ICU- (426 and 427)  Upon conclusion of case, patient transferred to which of the following locations: PACU/Non-ICU  Use of transfer checklist/protocol: Yes  Exclusion: Service Performed in Patient Hospital Room (and thus did not require transfer): N/A    PACU Reintubation- (AQI31)  General anesthesia requiring endotracheal intubation (ETT) along with subsequent extubation in OR or PACU: Yes  Required reintubation in the PACU: No   Extubation was a planned trial documented in the medical record prior to removal of the original airway device:  N/A    Unplanned admission to ICU related to anesthesia service up through end of PACU care- (MD51)  Unplanned admission to ICU (not initially anticipated at anesthesia start time): No

## 2019-11-22 VITALS
WEIGHT: 225.97 LBS | BODY MASS INDEX: 37.65 KG/M2 | TEMPERATURE: 98.1 F | HEIGHT: 65 IN | HEART RATE: 96 BPM | RESPIRATION RATE: 18 BRPM | OXYGEN SATURATION: 94 % | DIASTOLIC BLOOD PRESSURE: 68 MMHG | SYSTOLIC BLOOD PRESSURE: 101 MMHG

## 2019-11-22 PROBLEM — N94.6 DYSMENORRHEA: Status: ACTIVE | Noted: 2019-11-22

## 2019-11-22 PROBLEM — N80.9 ENDOMETRIOSIS: Status: ACTIVE | Noted: 2019-11-22

## 2019-11-22 LAB
BASOPHILS # BLD AUTO: 0.2 % (ref 0–1.8)
BASOPHILS # BLD: 0.03 K/UL (ref 0–0.12)
EOSINOPHIL # BLD AUTO: 0.02 K/UL (ref 0–0.51)
EOSINOPHIL NFR BLD: 0.1 % (ref 0–6.9)
ERYTHROCYTE [DISTWIDTH] IN BLOOD BY AUTOMATED COUNT: 43.2 FL (ref 35.9–50)
HCT VFR BLD AUTO: 40.1 % (ref 37–47)
HGB BLD-MCNC: 13.4 G/DL (ref 12–16)
IMM GRANULOCYTES # BLD AUTO: 0.1 K/UL (ref 0–0.11)
IMM GRANULOCYTES NFR BLD AUTO: 0.5 % (ref 0–0.9)
LYMPHOCYTES # BLD AUTO: 1.49 K/UL (ref 1–4.8)
LYMPHOCYTES NFR BLD: 7.7 % (ref 22–41)
MCH RBC QN AUTO: 31.5 PG (ref 27–33)
MCHC RBC AUTO-ENTMCNC: 33.4 G/DL (ref 33.6–35)
MCV RBC AUTO: 94.1 FL (ref 81.4–97.8)
MONOCYTES # BLD AUTO: 0.89 K/UL (ref 0–0.85)
MONOCYTES NFR BLD AUTO: 4.6 % (ref 0–13.4)
NEUTROPHILS # BLD AUTO: 16.83 K/UL (ref 2–7.15)
NEUTROPHILS NFR BLD: 86.9 % (ref 44–72)
NRBC # BLD AUTO: 0 K/UL
NRBC BLD-RTO: 0 /100 WBC
PLATELET # BLD AUTO: 216 K/UL (ref 164–446)
PMV BLD AUTO: 9.7 FL (ref 9–12.9)
RBC # BLD AUTO: 4.26 M/UL (ref 4.2–5.4)
WBC # BLD AUTO: 19.4 K/UL (ref 4.8–10.8)

## 2019-11-22 PROCEDURE — 85025 COMPLETE CBC W/AUTO DIFF WBC: CPT

## 2019-11-22 PROCEDURE — 700102 HCHG RX REV CODE 250 W/ 637 OVERRIDE(OP): Performed by: SPECIALIST

## 2019-11-22 PROCEDURE — 36415 COLL VENOUS BLD VENIPUNCTURE: CPT

## 2019-11-22 PROCEDURE — 700112 HCHG RX REV CODE 229: Performed by: SPECIALIST

## 2019-11-22 PROCEDURE — 700111 HCHG RX REV CODE 636 W/ 250 OVERRIDE (IP): Performed by: SPECIALIST

## 2019-11-22 PROCEDURE — A9270 NON-COVERED ITEM OR SERVICE: HCPCS | Performed by: SPECIALIST

## 2019-11-22 RX ORDER — IBUPROFEN 800 MG/1
800 TABLET ORAL EVERY 8 HOURS PRN
Qty: 30 TAB | Refills: 0 | Status: ON HOLD | OUTPATIENT
Start: 2019-11-22 | End: 2021-03-16

## 2019-11-22 RX ORDER — OXYCODONE AND ACETAMINOPHEN 10; 325 MG/1; MG/1
1-2 TABLET ORAL EVERY 4 HOURS PRN
Qty: 28 TAB | Refills: 0 | Status: SHIPPED | OUTPATIENT
Start: 2019-11-22 | End: 2019-11-29

## 2019-11-22 RX ADMIN — SIMETHICONE CHEW TAB 80 MG 80 MG: 80 TABLET ORAL at 06:28

## 2019-11-22 RX ADMIN — ONDANSETRON 4 MG: 2 INJECTION INTRAMUSCULAR; INTRAVENOUS at 02:53

## 2019-11-22 RX ADMIN — KETOROLAC TROMETHAMINE 30 MG: 30 INJECTION, SOLUTION INTRAMUSCULAR at 06:23

## 2019-11-22 RX ADMIN — DOCUSATE SODIUM 100 MG: 100 CAPSULE, LIQUID FILLED ORAL at 06:23

## 2019-11-22 RX ADMIN — HYDROMORPHONE HYDROCHLORIDE 4 MG: 4 TABLET ORAL at 02:49

## 2019-11-22 RX ADMIN — HYDROMORPHONE HYDROCHLORIDE 4 MG: 4 TABLET ORAL at 08:12

## 2019-11-22 RX ADMIN — ACETAMINOPHEN 1000 MG: 500 TABLET ORAL at 06:23

## 2019-11-22 ASSESSMENT — LIFESTYLE VARIABLES
EVER FELT BAD OR GUILTY ABOUT YOUR DRINKING: NO
TOTAL SCORE: 0
AVERAGE NUMBER OF DAYS PER WEEK YOU HAVE A DRINK CONTAINING ALCOHOL: 0
HAVE YOU EVER FELT YOU SHOULD CUT DOWN ON YOUR DRINKING: NO
CONSUMPTION TOTAL: NEGATIVE
HAVE PEOPLE ANNOYED YOU BY CRITICIZING YOUR DRINKING: NO
TOTAL SCORE: 0
EVER HAD A DRINK FIRST THING IN THE MORNING TO STEADY YOUR NERVES TO GET RID OF A HANGOVER: NO
ON A TYPICAL DAY WHEN YOU DRINK ALCOHOL HOW MANY DRINKS DO YOU HAVE: 0
HOW MANY TIMES IN THE PAST YEAR HAVE YOU HAD 5 OR MORE DRINKS IN A DAY: 0
TOTAL SCORE: 0
ALCOHOL_USE: NO
DOES PATIENT WANT TO STOP DRINKING: NO

## 2019-11-22 ASSESSMENT — PATIENT HEALTH QUESTIONNAIRE - PHQ9
1. LITTLE INTEREST OR PLEASURE IN DOING THINGS: NOT AT ALL
2. FEELING DOWN, DEPRESSED, IRRITABLE, OR HOPELESS: NOT AT ALL
SUM OF ALL RESPONSES TO PHQ9 QUESTIONS 1 AND 2: 0

## 2019-11-22 NOTE — DISCHARGE INSTRUCTIONS
ACTIVITY: Rest and take it easy for the first 24 hours.  A responsible adult is recommended to remain with you during that time.  It is normal to feel sleepy.  We encourage you to not do anything that requires balance, judgment or coordination.    MILD FLU-LIKE SYMPTOMS ARE NORMAL. YOU MAY EXPERIENCE GENERALIZED MUSCLE ACHES, THROAT IRRITATION, HEADACHE AND/OR SOME NAUSEA.    FOR 24 HOURS DO NOT:  Drive, operate machinery or run household appliances.  Drink beer or alcoholic beverages.   Make important decisions or sign legal documents.    SPECIAL INSTRUCTIONS: See attached instruction sheet    DIET: To avoid nausea, slowly advance diet as tolerated, avoiding spicy or greasy foods for the first day.  Add more substantial food to your diet according to your physician's instructions.  Babies can be fed formula or breast milk as soon as they are hungry.  INCREASE FLUIDS AND FIBER TO AVOID CONSTIPATION.    SURGICAL DRESSING/BATHING: May remove bandaids in 24-48 hours. Ok to shower but no tub baths or hot tubs until ok with your Dr.    FOLLOW-UP APPOINTMENT:  A follow-up appointment should be arranged with your doctor; call to schedule.    You should CALL YOUR PHYSICIAN if you develop:  Fever greater than 101 degrees F.  Pain not relieved by medication, or persistent nausea or vomiting.  Excessive bleeding (blood soaking through dressing) or unexpected drainage from the wound.  Extreme redness or swelling around the incision site, drainage of pus or foul smelling drainage.  Inability to urinate or empty your bladder within 8 hours.  Problems with breathing or chest pain.    You should call 911 if you develop problems with breathing or chest pain.  If you are unable to contact your doctor or surgical center, you should go to the nearest emergency room or urgent care center.  Physician's telephone #: [phone]    If any questions arise, call your doctor.  If your doctor is not available, please feel free to call the  Surgical Center at [phone].  The Center is open Monday through Friday from 7AM to 7PM.  You can also call the HEALTH HOTLINE open 24 hours/day, 7 days/week and speak to a nurse at [phone], or toll free at [phone].    A registered nurse may call you a few days after your surgery to see how you are doing after your procedure.    MEDICATIONS: Resume taking daily medication.  Take prescribed pain medication with food.  If no medication is prescribed, you may take non-aspirin pain medication if needed.  PAIN MEDICATION CAN BE VERY CONSTIPATING.  Take a stool softener or laxative such as senokot, pericolace, or milk of magnesia if needed.    Prescription given for Pain  Last pain medication given at 0812 AM .    If your physician has prescribed pain medication that includes Acetaminophen (Tylenol), do not take additional Acetaminophen (Tylenol) while taking the prescribed medication.    Depression / Suicide Risk    As you are discharged from this [facility] facility, it is important to learn how to keep safe from harming yourself.    Recognize the warning signs:  · Abrupt changes in personality, positive or negative- including increase in energy   · Giving away possessions  · Change in eating patterns- significant weight changes-  positive or negative  · Change in sleeping patterns- unable to sleep or sleeping all the time   · Unwillingness or inability to communicate  · Depression  · Unusual sadness, discouragement and loneliness  · Talk of wanting to die  · Neglect of personal appearance   · Rebelliousness- reckless behavior  · Withdrawal from people/activities they love  · Confusion- inability to concentrate     If you or a loved one observes any of these behaviors or has concerns about self-harm, here's what you can do:  · Talk about it- your feelings and reasons for harming yourself  · Remove any means that you might use to hurt yourself (examples: pills, rope, extension cords, firearm)  · Get  professional help from the community (Mental Health, Substance Abuse, psychological counseling)  · Do not be alone:Call your Safe Contact- someone whom you trust who will be there for you.  · Call your local CRISIS HOTLINE 830-5975 or 667-634-2537  · Call your local Children's Mobile Crisis Response Team [place] (064) 122-9965 or www.BugHerd  · Call the toll free National Suicide Prevention Hotlines   · National Suicide Prevention Lifeline 970-597-RKGD (2493)  · [place] [facility] Line Network 800-SUICIDE (475-6674)        Discharge Instructions    Discharged to home by car with relative. Discharged via wheelchair, hospital escort: Yes.  Special equipment needed: Not Applicable    Be sure to schedule a follow-up appointment with your primary care doctor or any specialists as instructed.     Discharge Plan:   Diet Plan: Discussed  Activity Level: Discussed  Confirmed Follow up Appointment: Patient to Call and Schedule Appointment  Confirmed Symptoms Management: Discussed  Medication Reconciliation Updated: Yes  Influenza Vaccine Indication: Patient Refuses    I understand that a diet low in cholesterol, fat, and sodium is recommended for good health. Unless I have been given specific instructions below for another diet, I accept this instruction as my diet prescription.   Other diet: Clear liquid to regular diet as tolerated     Special Instructions: None    · Is patient discharged on Warfarin / Coumadin?   No     Depression / Suicide Risk    As you are discharged from this [facility] facility, it is important to learn how to keep safe from harming yourself.    Recognize the warning signs:  · Abrupt changes in personality, positive or negative- including increase in energy   · Giving away possessions  · Change in eating patterns- significant weight changes-  positive or negative  · Change in sleeping patterns- unable to sleep or sleeping all the time   · Unwillingness or inability to  communicate  · Depression  · Unusual sadness, discouragement and loneliness  · Talk of wanting to die  · Neglect of personal appearance   · Rebelliousness- reckless behavior  · Withdrawal from people/activities they love  · Confusion- inability to concentrate     If you or a loved one observes any of these behaviors or has concerns about self-harm, here's what you can do:  · Talk about it- your feelings and reasons for harming yourself  · Remove any means that you might use to hurt yourself (examples: pills, rope, extension cords, firearm)  · Get professional help from the community (Mental Health, Substance Abuse, psychological counseling)  · Do not be alone:Call your Safe Contact- someone whom you trust who will be there for you.  · Call your local CRISIS HOTLINE 007-1790 or 311-575-8949  · Call your local Children's Mobile Crisis Response Team [place] (619) 717-6365 or www.Yhat  · Call the toll free National Suicide Prevention Hotlines   · National Suicide Prevention Lifeline 241-898-PRST (1470)  · National Hope Line Network 800-SUICIDE (136-2528)

## 2019-11-22 NOTE — CARE PLAN
Problem: Communication  Goal: The ability to communicate needs accurately and effectively will improve  Outcome: PROGRESSING AS EXPECTED  Note:   Discussed plan of care with patient. All patient's needs met at this time.All patient questions answered at this time.        Problem: Safety  Goal: Will remain free from injury  Outcome: PROGRESSING AS EXPECTED  Note:     This RN reinforced patient using call light appropriately. Patient does call appropriately.Patient wearing non skid socks, patient's bed locked and in lowest position. Call light and belongings in reach. Rounding in place to check on patient's well being

## 2019-11-22 NOTE — PROGRESS NOTES
Received report from night shift nurse.   Assumed care of patient.   Patient A&O x  4  .   Patient denies SOB or Chest pain.   Patient's respirations are even and unlabored  .  Patient tolerating regular  diet , Last BM PTA but + flatulence , Bowel sounds normoactive x 4 quadrants  Patient states pain was a 8/10 this AM. PRN medication admin Per EMAR. Upon reassessment, patient stated pain when down to a 2/10 which is tolerable.   Patient's mobility is  Up self and steady.   Pulses 2+ x 4 extremities.    Skin: x 4 lap sites to abdomen. Old drainage. Bandaids in place. No redness or swelling.   Patient to be discharged today.   Discussed POC with patient and answered any questions that the patient had.   Reinforced use of call light. Bed locked and in lowest position. Belongings and call light in reach. Hourly rounding in place to check on patient's well being.

## 2019-11-22 NOTE — PROGRESS NOTES
Bedside report received.  Assessment complete.  A&O x 4. Patient calls appropriately.  Patient up with standby assist and FWW.    Patient has 7/10 pain. Medicated per MAR, and provided heat packs.  Denies N&V. Tolerating Regular/clear liquid diet per request.  Surgical lap stabs are covered with adhesive bandage, old drainge noted.  + void, fley in place per order, - flatus, - BM.  Patient denies SOB.  Patient pleasant with staff and resting in bed.  Review plan with of care with patient. Call light and personal belongings with in reach. Hourly rounding in place. All needs met at this time.

## 2019-11-22 NOTE — DISCHARGE PLANNING
Patient is eligible for Medicaid Meds to Beds at discharge if they have coverage with [place] Medicaid, Medicaid FFS, Medicaid HMO ([facility]), or [place]. This service is provided through the [facility] if orders are received prior to 4 p.m. Monday through Friday, excluding holidays. Please call x [phone] prior to discharge.

## 2019-11-22 NOTE — DISCHARGE PLANNING
Care Transition Team Assessment    Assessment Complete.  LSW met with the patient at bedside.  The patient verified the demographic information in the Facesheet.  Patient reports she lives at home with her .  The patient is independent with ADLS and reports she owns a FWW.    Patient is insured, uses [facility], and reports no financial barriers.  The patient's PCP is Connor Akins. At this time, the patient's anticipated discharge disposition is Home with Outpatient Follow Up.    Information Source  Orientation : Oriented x 4  Information Given By: Patient  Informant's Name: (Yasemin)  Who is responsible for making decisions for patient? : Patient    Readmission Evaluation  Is this a readmission?: No         Interdisciplinary Discharge Planning  Patient or legal guardian wants to designate a caregiver (see row info): No    Discharge Preparedness  What is your plan after discharge?: Home with help  What are your discharge supports?: Spouse  Prior Functional Level: Independent with Activities of Daily Living    Functional Assesment  Prior Functional Level: Independent with Activities of Daily Living    Finances  Financial Barriers to Discharge: No  Prescription Coverage: Yes              Advance Directive  Advance Directive?: None  Advance Directive offered?: AD Booklet refused    Domestic Abuse  Have you ever been the victim of abuse or violence?: No  Physical Abuse or Sexual Abuse: No  Verbal Abuse or Emotional Abuse: No  Possible Abuse Reported to:: Not Applicable    Psychological Assessment  History of Substance Abuse: None  History of Psychiatric Problems: No    Discharge Risks or Barriers  Discharge risks or barriers?: No    Anticipated Discharge Information  Anticipated discharge disposition: Home

## 2019-11-22 NOTE — PROGRESS NOTES
2 RN skin check    3 lap stabs in pannus. One in belly button, all dressed with adhesive bandages. Scant drainage noted.     All other skin intact.

## 2019-11-22 NOTE — PROGRESS NOTES
Patel Catheter removed per order.   Patient educated on need to void within 6 hours of removal.     0249- Patient voided 400  0500- Patient voided 450

## 2019-11-22 NOTE — PROGRESS NOTES
Received report from PACU nurse. Patient transported to floor. Patient making rude remarks to transport and CNA. This RN deescalated the situation and was able to calm the patient down and address the patient's concerns. This RN explained to the patient that this RN could not give her any medication until they were verified by pharmacy. Patient verbalized understanding. Charge RN notified of the patients behavior.     Patient A&O x 4. .   Patient denies SOB or Chest pain.   Patient's respirations are even and unlabored.    Patient tolerating clear diet per patient's request , Last BM 11/21/2019 PTA, Bowel sounds normoactive x 4 quadrants.   Patient stated she has a 7/10 pain in back. Ice pack offered until pain medication is available. Patient refused.   Patient's mobility is  Stand by assist with FWW.     Skin : x 3 lap sites to abdomen with band aids in place. Old drainage present. Skin otherwise intact.     Discussed POC with patient and answered any questions that the patient had.   Reinforced use of call light. Bed locked and in lowest position. Belongings and call light in reach. Hourly rounding in place to check on patient's well being.

## 2019-12-13 ENCOUNTER — HOSPITAL ENCOUNTER (OUTPATIENT)
Dept: RADIOLOGY | Facility: MEDICAL CENTER | Age: 34
End: 2019-12-13
Attending: SPECIALIST
Payer: MEDICAID

## 2019-12-13 DIAGNOSIS — K63.2 FISTULA OF INTESTINE, EXCLUDING RECTUM AND ANUS: ICD-10-CM

## 2019-12-13 DIAGNOSIS — R10.2 PELVIC AND PERINEAL PAIN: ICD-10-CM

## 2019-12-13 DIAGNOSIS — Z48.89 ENCOUNTER FOR OTHER SPECIFIED SURGICAL AFTERCARE: ICD-10-CM

## 2019-12-13 PROCEDURE — 700117 HCHG RX CONTRAST REV CODE 255: Performed by: SPECIALIST

## 2019-12-13 PROCEDURE — 74178 CT ABD&PLV WO CNTR FLWD CNTR: CPT

## 2019-12-13 RX ADMIN — IOHEXOL 100 ML: 350 INJECTION, SOLUTION INTRAVENOUS at 16:30

## 2020-05-22 ENCOUNTER — HOSPITAL ENCOUNTER (EMERGENCY)
Facility: MEDICAL CENTER | Age: 35
End: 2020-05-23
Attending: EMERGENCY MEDICINE
Payer: MEDICAID

## 2020-05-22 VITALS
HEIGHT: 64 IN | RESPIRATION RATE: 18 BRPM | TEMPERATURE: 98.4 F | SYSTOLIC BLOOD PRESSURE: 115 MMHG | DIASTOLIC BLOOD PRESSURE: 77 MMHG | OXYGEN SATURATION: 96 % | HEART RATE: 67 BPM | BODY MASS INDEX: 40.65 KG/M2 | WEIGHT: 238.1 LBS

## 2020-05-22 DIAGNOSIS — R10.30 LOWER ABDOMINAL PAIN: ICD-10-CM

## 2020-05-22 DIAGNOSIS — R10.9 ABDOMINAL CRAMPING: ICD-10-CM

## 2020-05-22 LAB
ALBUMIN SERPL BCP-MCNC: 4.3 G/DL (ref 3.2–4.9)
ALBUMIN/GLOB SERPL: 1.7 G/DL
ALP SERPL-CCNC: 55 U/L (ref 30–99)
ALT SERPL-CCNC: 13 U/L (ref 2–50)
ANION GAP SERPL CALC-SCNC: 12 MMOL/L (ref 7–16)
APPEARANCE UR: CLEAR
AST SERPL-CCNC: 16 U/L (ref 12–45)
BASOPHILS # BLD AUTO: 0.7 % (ref 0–1.8)
BASOPHILS # BLD: 0.08 K/UL (ref 0–0.12)
BILIRUB SERPL-MCNC: 0.2 MG/DL (ref 0.1–1.5)
BILIRUB UR QL STRIP.AUTO: NEGATIVE
BUN SERPL-MCNC: 10 MG/DL (ref 8–22)
CALCIUM SERPL-MCNC: 9.2 MG/DL (ref 8.4–10.2)
CHLORIDE SERPL-SCNC: 104 MMOL/L (ref 96–112)
CO2 SERPL-SCNC: 23 MMOL/L (ref 20–33)
COLOR UR: YELLOW
CREAT SERPL-MCNC: 0.8 MG/DL (ref 0.5–1.4)
EOSINOPHIL # BLD AUTO: 0.14 K/UL (ref 0–0.51)
EOSINOPHIL NFR BLD: 1.2 % (ref 0–6.9)
ERYTHROCYTE [DISTWIDTH] IN BLOOD BY AUTOMATED COUNT: 43.4 FL (ref 35.9–50)
GLOBULIN SER CALC-MCNC: 2.6 G/DL (ref 1.9–3.5)
GLUCOSE SERPL-MCNC: 107 MG/DL (ref 65–99)
GLUCOSE UR STRIP.AUTO-MCNC: NEGATIVE MG/DL
HCT VFR BLD AUTO: 43.8 % (ref 37–47)
HGB BLD-MCNC: 14.6 G/DL (ref 12–16)
IMM GRANULOCYTES # BLD AUTO: 0.06 K/UL (ref 0–0.11)
IMM GRANULOCYTES NFR BLD AUTO: 0.5 % (ref 0–0.9)
KETONES UR STRIP.AUTO-MCNC: NEGATIVE MG/DL
LEUKOCYTE ESTERASE UR QL STRIP.AUTO: NEGATIVE
LIPASE SERPL-CCNC: 28 U/L (ref 7–58)
LYMPHOCYTES # BLD AUTO: 2.39 K/UL (ref 1–4.8)
LYMPHOCYTES NFR BLD: 19.9 % (ref 22–41)
MCH RBC QN AUTO: 30.5 PG (ref 27–33)
MCHC RBC AUTO-ENTMCNC: 33.3 G/DL (ref 33.6–35)
MCV RBC AUTO: 91.4 FL (ref 81.4–97.8)
MICRO URNS: NORMAL
MONOCYTES # BLD AUTO: 0.58 K/UL (ref 0–0.85)
MONOCYTES NFR BLD AUTO: 4.8 % (ref 0–13.4)
NEUTROPHILS # BLD AUTO: 8.74 K/UL (ref 2–7.15)
NEUTROPHILS NFR BLD: 72.9 % (ref 44–72)
NITRITE UR QL STRIP.AUTO: NEGATIVE
NRBC # BLD AUTO: 0 K/UL
NRBC BLD-RTO: 0 /100 WBC
PH UR STRIP.AUTO: 5 [PH] (ref 5–8)
PLATELET # BLD AUTO: 238 K/UL (ref 164–446)
PMV BLD AUTO: 9.2 FL (ref 9–12.9)
POTASSIUM SERPL-SCNC: 3.9 MMOL/L (ref 3.6–5.5)
PROT SERPL-MCNC: 6.9 G/DL (ref 6–8.2)
PROT UR QL STRIP: NEGATIVE MG/DL
RBC # BLD AUTO: 4.79 M/UL (ref 4.2–5.4)
RBC UR QL AUTO: NEGATIVE
SODIUM SERPL-SCNC: 139 MMOL/L (ref 135–145)
SP GR UR REFRACTOMETRY: 1.02
WBC # BLD AUTO: 12 K/UL (ref 4.8–10.8)

## 2020-05-22 PROCEDURE — 81003 URINALYSIS AUTO W/O SCOPE: CPT

## 2020-05-22 PROCEDURE — 96374 THER/PROPH/DIAG INJ IV PUSH: CPT

## 2020-05-22 PROCEDURE — 700111 HCHG RX REV CODE 636 W/ 250 OVERRIDE (IP): Performed by: EMERGENCY MEDICINE

## 2020-05-22 PROCEDURE — 85025 COMPLETE CBC W/AUTO DIFF WBC: CPT

## 2020-05-22 PROCEDURE — 99284 EMERGENCY DEPT VISIT MOD MDM: CPT

## 2020-05-22 PROCEDURE — 80053 COMPREHEN METABOLIC PANEL: CPT

## 2020-05-22 PROCEDURE — 83690 ASSAY OF LIPASE: CPT

## 2020-05-22 PROCEDURE — 96375 TX/PRO/DX INJ NEW DRUG ADDON: CPT

## 2020-05-22 RX ORDER — ONDANSETRON 2 MG/ML
4 INJECTION INTRAMUSCULAR; INTRAVENOUS ONCE
Status: COMPLETED | OUTPATIENT
Start: 2020-05-22 | End: 2020-05-22

## 2020-05-22 RX ORDER — HYDROMORPHONE HYDROCHLORIDE 1 MG/ML
1 INJECTION, SOLUTION INTRAMUSCULAR; INTRAVENOUS; SUBCUTANEOUS ONCE
Status: COMPLETED | OUTPATIENT
Start: 2020-05-22 | End: 2020-05-22

## 2020-05-22 RX ORDER — OXYCODONE HYDROCHLORIDE 5 MG/1
5 TABLET ORAL EVERY 6 HOURS PRN
Qty: 10 TAB | Refills: 0 | Status: SHIPPED | OUTPATIENT
Start: 2020-05-22 | End: 2020-05-25

## 2020-05-22 RX ADMIN — ONDANSETRON HYDROCHLORIDE 4 MG: 2 SOLUTION INTRAMUSCULAR; INTRAVENOUS at 23:01

## 2020-05-22 RX ADMIN — HYDROMORPHONE HYDROCHLORIDE 1 MG: 1 INJECTION, SOLUTION INTRAMUSCULAR; INTRAVENOUS; SUBCUTANEOUS at 22:51

## 2020-05-22 ASSESSMENT — FIBROSIS 4 INDEX: FIB4 SCORE: .5621826951410452146

## 2020-05-23 NOTE — ED TRIAGE NOTES
Pt presents with abd cramping that began 1100 today. 6/10 pain. Last took bentyl 1200. Dx with colitis and had a colonoscopy scheduled but has been cancelled d/t COVID. No fever.  Pain has been intermittent since the colitis dx in Jan.    Patient masked. No respiratory symptoms, no recent travel, denies known COVID exposure.

## 2020-05-23 NOTE — ED PROVIDER NOTES
ED Provider Note      Means of Arrival: Private vehicle  History obtained from: Patient, medical record      CHIEF COMPLAINT  Chief Complaint   Patient presents with   • Abdominal Cramping       HPI  Yasemin Bradley is a 34 y.o. female who presents with abdominal cramping.  Patient reports a history of colitis.  She states that she has had CAT scans in the past demonstrating colitis.  She reports that she has intermittent episodes of lower abdominal cramping which she ascribes to her colitis every other week.  This will usually resolve over several hours, particular if she is able to calm herself down.  Today, she has had multiple hours of severe lower abdominal cramping.  She took her normal Bentyl and acetaminophen as well as Zofran without benefit today.  She reports that her symptoms are similar to her prior episodes of intermittent colitis, however more severe.  She was scheduled for colonoscopy, however this was deferred due to the COVID-19 pandemic.  She denies any fevers, vomiting, diarrhea, bloody stools, dysuria, hematuria    REVIEW OF SYSTEMS  CONSTITUTIONAL:  No fever.  CARDIOVASCULAR:  No chest discomfort.  RESPIRATORY:  No pleuritic chest pain.  GASTROINTESTINAL:   See HPI  GENITOURINARY:   No dysuria.  MUSCULOSKELETAL:  No arthralgia.    See HPI for further details.   All other systems are negative.     PAST MEDICAL HISTORY  Past Medical History:   Diagnosis Date   • Arrhythmia     PVCs and PACs, has had holter in the past   • ASTHMA     in the past, no problem now   • Cold April 2017    upper respiratory infection   • Colitis    • Dysmenorrhea    • Fibromyalgia    • Narcotic abuse (HCC)    • Ovarian cyst    • PID (pelvic inflammatory disease)    • Pregnancy    • Psychiatric problem     anxiety, ADHD   • PVC's (premature ventricular contractions)    • Urinary bladder disorder     frequent UTI's       FAMILY HISTORY  Family History   Problem Relation Age of Onset   • No Known Problems Mother    • No  Known Problems Father        SOCIAL HISTORY   reports that she has quit smoking. Her smoking use included cigarettes. She smoked 0.50 packs per day. She has never used smokeless tobacco. She reports that she does not drink alcohol or use drugs.    SURGICAL HISTORY  Past Surgical History:   Procedure Laterality Date   • HYSTERECTOMY LAPAROSCOPY N/A 11/21/2019    Procedure: HYSTERECTOMY, LAPAROSCOPIC;  Surgeon: Jericho Fields M.D.;  Location: SURGERY SAME DAY [facility];  Service: Gynecology   • SALPINGECTOMY Bilateral 11/21/2019    Procedure: SALPINGECTOMY;  Surgeon: Jericho Fields M.D.;  Location: SURGERY SAME DAY [facility];  Service: Gynecology   • CYSTOSCOPY N/A 11/21/2019    Procedure: CYSTOSCOPY;  Surgeon: Jericho Fields M.D.;  Location: SURGERY SAME DAY [facility];  Service: Gynecology   • HYSTEROSCOPY THERMAL ABLATION N/A 7/10/2017    Procedure: HYSTEROSCOPY THERMAL ABLATION endometrial ;  Surgeon: Jericho Fields M.D.;  Location: SURGERY SAME DAY [facility];  Service:    • TUBAL COAGULATION LAPAROSCOPIC BILATERAL Bilateral 7/10/2017    Procedure: TUBAL COAGULATION LAPAROSCOPIC BILATERAL;  Surgeon: Jericho Fields M.D.;  Location: SURGERY SAME DAY [facility];  Service:    • DILATION AND CURETTAGE N/A 7/10/2017    Procedure: DILATION AND CURETTAGE;  Surgeon: Jericho Fields M.D.;  Location: SURGERY SAME DAY [facility];  Service:    • PRIMARY C SECTION  12/19/2010    Performed by JERICHO FIELDS at LABOR AND DELIVERY   • OTHER       T & A  age 14        CURRENT MEDICATIONS  Home Medications     Reviewed by Starr Velásquez R.N. (Registered Nurse) on 05/22/20 at 2041  Med List Status: Not Addressed   Medication Last Dose Status   acetaminophen (TYLENOL) 500 MG Tab  Active   ibuprofen (MOTRIN) 200 MG Tab  Active   ibuprofen (MOTRIN) 800 MG Tab  Active   ondansetron (ZOFRAN ODT) 4 MG TABLET DISPERSIBLE  Active   polyethylene glycol/lytes (MIRALAX) Pack  Active           "      ALLERGIES  Allergies   Allergen Reactions   • Reglan [Kdc:Yellow Dye+Ci Pigment Blue 63+Metoclopramide]      Involuntary motor tick       PHYSICAL EXAM  VITAL SIGNS: /77   Pulse 67   Temp 36.9 °C (98.4 °F) (Temporal)   Resp 18   Ht 1.626 m (5' 4\")   Wt 108 kg (238 lb 1.6 oz)   LMP 11/20/2019   SpO2 96%   BMI 40.87 kg/m²    Gen: Alert, moderate distress  HENT: ATNC  Eyes: Normal conjunctiva  Neck: trachea midline  Resp: no respiratory distress  CV: No JVD, regular rate and rhythm  Abd: non-distended, soft, nontender  : No CVA tenderness  Ext: No deformities  Psych: normal mood  Neuro: speech fluent       LABS  Labs Reviewed   CBC WITH DIFFERENTIAL - Abnormal; Notable for the following components:       Result Value    WBC 12.0 (*)     MCHC 33.3 (*)     Neutrophils-Polys 72.90 (*)     Lymphocytes 19.90 (*)     Neutrophils (Absolute) 8.74 (*)     All other components within normal limits   COMP METABOLIC PANEL - Abnormal; Notable for the following components:    Glucose 107 (*)     All other components within normal limits   LIPASE   URINALYSIS,CULTURE IF INDICATED   ESTIMATED GFR   REFRACTOMETER SG          COURSE & MEDICAL DECISION MAKING  Pertinent Labs & Imaging studies reviewed. (See chart for details)    Patient presents with abdominal cramping consistent with prior episodes of her abdominal cramping, presumed to be from colitis.  Given the stuttering episodes, this seems less likely and infectious colitis.  Low clinical suspicion for diverticulitis.  Patient has a benign abdominal exam.  Labs otherwise reassuring.  Patient has already taken nonopiate pain medications and antispasmodics, will trial opioid pain medications for reduced gut motility as well as pain relief.  Imaging, specifically CAT scan, was discussed with the patient using shared decision-making, this was declined.  I believe this is reasonable as she has benign abdominal exam, has symptoms consistent with her intermittent " symptoms of the past, reassuring vital signs.    Examination, patient feeling better.  She is advised to follow-up with her regular doctor and her gastroenterologist.  She has no urinary symptoms to suggest urinary etiology, no electrolyte abnormalities.  Low suspicion for appendicitis, bowel obstruction, visceral perforation.    In prescribing controlled substances to this patient, I certify that I have obtained and reviewed the medical history of Yasemin Bradley. I have also made a good jacqueline effort to obtain applicable records from other providers who have treated the patient and records did not demonstrate any increased risk of substance abuse that would prevent me from prescribing controlled substances.     I have conducted a physical exam and documented it. I have reviewed Ms. Bradley’s prescription history as maintained by the [place] Prescription Monitoring Program.     I have assessed the patient’s risk for abuse, dependency, and addiction using the validated Opioid Risk Tool available at https://www.mdcalc.com/hpqgts-xevm-nabm-ort-narcotic-abuse.     Given the above, I believe the benefits of controlled substance therapy outweigh the risks. The reasons for prescribing controlled substances include non-narcotic, oral analgesic alternatives have been inadequate for pain control. Accordingly, I have discussed the risk and benefits, treatment plan, and alternative therapies with the patient.   The risks of the medication, including constipation, addiction and altered mental status were discussed with the patient and they expressed understanding. They were encouraged to use the minimum dose necessary to control their breakthrough pain.    Appropriate PPE were worn at this encounter.     FINAL IMPRESSION  1. Lower abdominal pain    2. Abdominal cramping             DISPOSITION:  Patient will be discharged home in stable condition.    FOLLOW UP:  Connor Akins M.D.  [address] [place]  [id number]  [phone]    Schedule an appointment as soon as possible for a visit       Your gastroenterologist            OUTPATIENT MEDICATIONS:  Discharge Medication List as of 5/22/2020 11:51 PM      START taking these medications    Details   oxyCODONE immediate-release (ROXICODONE) 5 MG Tab Take 1 Tab by mouth every 6 hours as needed for Severe Pain for up to 3 days., Disp-10 Tab,R-0, Print Rx Paper

## 2020-05-23 NOTE — DISCHARGE INSTRUCTIONS
You were seen in the emergency department for abdominal pain. Your labs and physical exam were reassuring. Your imaging was also reassuring.  At this time, your pain does not appear to be dangerous.     Please follow up with your regular doctor and your gastroenterologist.    For pain you can take ibuprofen (Motrin), 600mg every 6 hours as needed for pain (take with food to avoid GI upset). You can also take acetaminophen (Tylenol), 1000mg every 8 hours as needed for pain. Do not take more than 3000mg of acetaminophen in any 24 hour period.  Taking these medications regularly during the day can be very effective in controlling pain.    You are being sent home with an opioid pain medication. This medication causes sedation and you should not drive or operate machinery while taking it. You should not use alcohol or recreational drugs while taking this medication. Side effects of this medication can include itching, nausea, and constipation.    There is a risk of addiction to this medication and you should only take the smallest amount necessary to control your symptoms. You should use other non-opioid pain medications for your baseline pain and only use this medication if necessary.     There are many alternatives to pain medications, such as massage, acupuncture, and meditation. Check with your health insurance regarding their coverage of these complementary treatments.      Please seek medical care if your symptoms do not improve in 24 hours, if you develop worsening pain, ongoing vomiting, tarry or bloody stools, or for any other new or concerning findings.       ================================  Coronavirus Information    Your visit did NOT relate to coronavirus, but if you or your family develop symptoms that concern you for coronavirus (please see CDC website for symptoms), please contact the [facility] hotline (or your local health department)  or your healthcare provider before going to a  medical facility:    [facility] [place], [place]  24hr hotline: [phone]    Information is available from the Centers for Disease Control and Prevention  www.CDC.gov    and     [facility] [place], [place]  [email or web address]    If you are severely ill or having a hard time breathing, please immediatly seek medical care. Notify the  or Emergency Department Triage about your symptoms.

## 2020-07-09 ENCOUNTER — APPOINTMENT (OUTPATIENT)
Dept: RADIOLOGY | Facility: MEDICAL CENTER | Age: 35
End: 2020-07-09
Attending: EMERGENCY MEDICINE
Payer: MEDICAID

## 2020-07-09 ENCOUNTER — HOSPITAL ENCOUNTER (EMERGENCY)
Facility: MEDICAL CENTER | Age: 35
End: 2020-07-09
Attending: EMERGENCY MEDICINE
Payer: MEDICAID

## 2020-07-09 VITALS
HEIGHT: 64 IN | SYSTOLIC BLOOD PRESSURE: 137 MMHG | WEIGHT: 236.77 LBS | BODY MASS INDEX: 40.42 KG/M2 | RESPIRATION RATE: 20 BRPM | HEART RATE: 71 BPM | DIASTOLIC BLOOD PRESSURE: 84 MMHG | OXYGEN SATURATION: 94 % | TEMPERATURE: 97.1 F

## 2020-07-09 DIAGNOSIS — R10.9 FLANK PAIN: ICD-10-CM

## 2020-07-09 LAB
ALBUMIN SERPL BCP-MCNC: 4.4 G/DL (ref 3.2–4.9)
ALBUMIN/GLOB SERPL: 1.7 G/DL
ALP SERPL-CCNC: 52 U/L (ref 30–99)
ALT SERPL-CCNC: 12 U/L (ref 2–50)
ANION GAP SERPL CALC-SCNC: 11 MMOL/L (ref 7–16)
APPEARANCE UR: CLEAR
AST SERPL-CCNC: 17 U/L (ref 12–45)
BACTERIA #/AREA URNS HPF: ABNORMAL /HPF
BASOPHILS # BLD AUTO: 0.6 % (ref 0–1.8)
BASOPHILS # BLD: 0.05 K/UL (ref 0–0.12)
BILIRUB SERPL-MCNC: 0.5 MG/DL (ref 0.1–1.5)
BILIRUB UR QL STRIP.AUTO: NEGATIVE
BUN SERPL-MCNC: 8 MG/DL (ref 8–22)
CALCIUM SERPL-MCNC: 9.2 MG/DL (ref 8.4–10.2)
CHLORIDE SERPL-SCNC: 104 MMOL/L (ref 96–112)
CO2 SERPL-SCNC: 23 MMOL/L (ref 20–33)
COLOR UR: NORMAL
CREAT SERPL-MCNC: 0.77 MG/DL (ref 0.5–1.4)
EOSINOPHIL # BLD AUTO: 0.12 K/UL (ref 0–0.51)
EOSINOPHIL NFR BLD: 1.4 % (ref 0–6.9)
EPI CELLS #/AREA URNS HPF: ABNORMAL /HPF
ERYTHROCYTE [DISTWIDTH] IN BLOOD BY AUTOMATED COUNT: 42.5 FL (ref 35.9–50)
GLOBULIN SER CALC-MCNC: 2.6 G/DL (ref 1.9–3.5)
GLUCOSE SERPL-MCNC: 104 MG/DL (ref 65–99)
GLUCOSE UR STRIP.AUTO-MCNC: NORMAL MG/DL
HCT VFR BLD AUTO: 41.8 % (ref 37–47)
HGB BLD-MCNC: 14.2 G/DL (ref 12–16)
IMM GRANULOCYTES # BLD AUTO: 0.03 K/UL (ref 0–0.11)
IMM GRANULOCYTES NFR BLD AUTO: 0.4 % (ref 0–0.9)
KETONES UR STRIP.AUTO-MCNC: NORMAL MG/DL
LEUKOCYTE ESTERASE UR QL STRIP.AUTO: NORMAL
LIPASE SERPL-CCNC: 28 U/L (ref 7–58)
LYMPHOCYTES # BLD AUTO: 1.8 K/UL (ref 1–4.8)
LYMPHOCYTES NFR BLD: 21.3 % (ref 22–41)
MCH RBC QN AUTO: 30.9 PG (ref 27–33)
MCHC RBC AUTO-ENTMCNC: 34 G/DL (ref 33.6–35)
MCV RBC AUTO: 91.1 FL (ref 81.4–97.8)
MICRO URNS: NORMAL
MONOCYTES # BLD AUTO: 0.47 K/UL (ref 0–0.85)
MONOCYTES NFR BLD AUTO: 5.5 % (ref 0–13.4)
MUCOUS THREADS #/AREA URNS HPF: ABNORMAL /HPF
NEUTROPHILS # BLD AUTO: 6 K/UL (ref 2–7.15)
NEUTROPHILS NFR BLD: 70.8 % (ref 44–72)
NITRITE UR QL STRIP.AUTO: NORMAL
NRBC # BLD AUTO: 0 K/UL
NRBC BLD-RTO: 0 /100 WBC
PH UR STRIP.AUTO: 5 [PH] (ref 5–8)
PLATELET # BLD AUTO: 200 K/UL (ref 164–446)
PMV BLD AUTO: 8.9 FL (ref 9–12.9)
POTASSIUM SERPL-SCNC: 4.2 MMOL/L (ref 3.6–5.5)
PROT SERPL-MCNC: 7 G/DL (ref 6–8.2)
PROT UR QL STRIP: NORMAL MG/DL
RBC # BLD AUTO: 4.59 M/UL (ref 4.2–5.4)
RBC # URNS HPF: ABNORMAL /HPF
RBC UR QL AUTO: NORMAL
SODIUM SERPL-SCNC: 138 MMOL/L (ref 135–145)
SP GR UR STRIP.AUTO: 1.02
WBC # BLD AUTO: 8.5 K/UL (ref 4.8–10.8)
WBC #/AREA URNS HPF: ABNORMAL /HPF

## 2020-07-09 PROCEDURE — 96374 THER/PROPH/DIAG INJ IV PUSH: CPT

## 2020-07-09 PROCEDURE — 36415 COLL VENOUS BLD VENIPUNCTURE: CPT

## 2020-07-09 PROCEDURE — 83690 ASSAY OF LIPASE: CPT

## 2020-07-09 PROCEDURE — 85025 COMPLETE CBC W/AUTO DIFF WBC: CPT

## 2020-07-09 PROCEDURE — 80053 COMPREHEN METABOLIC PANEL: CPT

## 2020-07-09 PROCEDURE — 81001 URINALYSIS AUTO W/SCOPE: CPT

## 2020-07-09 PROCEDURE — 74176 CT ABD & PELVIS W/O CONTRAST: CPT

## 2020-07-09 PROCEDURE — 99284 EMERGENCY DEPT VISIT MOD MDM: CPT

## 2020-07-09 PROCEDURE — 700111 HCHG RX REV CODE 636 W/ 250 OVERRIDE (IP): Performed by: EMERGENCY MEDICINE

## 2020-07-09 RX ORDER — KETOROLAC TROMETHAMINE 30 MG/ML
30 INJECTION, SOLUTION INTRAMUSCULAR; INTRAVENOUS ONCE
Status: COMPLETED | OUTPATIENT
Start: 2020-07-09 | End: 2020-07-09

## 2020-07-09 RX ORDER — OXYCODONE HYDROCHLORIDE AND ACETAMINOPHEN 5; 325 MG/1; MG/1
1 TABLET ORAL 2 TIMES DAILY
Status: SHIPPED | COMMUNITY
End: 2020-09-25

## 2020-07-09 RX ORDER — LAMOTRIGINE 150 MG/1
150 TABLET ORAL DAILY
Status: ON HOLD | COMMUNITY
End: 2021-03-15

## 2020-07-09 RX ADMIN — KETOROLAC TROMETHAMINE 30 MG: 30 INJECTION, SOLUTION INTRAMUSCULAR at 11:30

## 2020-07-09 ASSESSMENT — FIBROSIS 4 INDEX: FIB4 SCORE: 0.62

## 2020-07-09 NOTE — ED TRIAGE NOTES
Pt to triage   Chief Complaint   Patient presents with   • Flank Pain   sudden onset that awoke her from sleep at 0400  Sharp stabbing pain on R side, pain increased when she voided    Pt A & 0 x 4, speech clear, ambulates well  COVID-19 screening criteria completed, pt denies high risk travel and denies contact with COVID-19 positive pt  Pt updated on triage process and asked to inform RN of any changes while waiting in lobby.

## 2020-07-09 NOTE — ED NOTES
"PT states she took over the counter \"AZO\" to help w/ pain but it did not help. PT states urine in bright red/orange due to that  "

## 2020-07-09 NOTE — ED NOTES
Pt given d/c paperwork, pt verbalized all information given. Pt ambulated out of the ER w/o difficulty

## 2020-07-09 NOTE — ED PROVIDER NOTES
ED Provider Note    CHIEF COMPLAINT  Chief Complaint   Patient presents with   • Flank Pain       HPI  Yasemin Bradley is a 35 y.o. female who presents to the Emergency Department with a chief complaint of flank pain.   The pain is  right sided and started  This morning.  The pain does  migrate to right groin.   The patient describes the pain as intermittent an severe.  There is no associated fever.   There is no associated vomiting .   There is no history of kidney stones.  There is a history of colitis but this feels different.  The patient has had a hysterectomy.  Pt states she would like a CT as one was not performed in May and she knows something is wrong.          REVIEW OF SYSTEMS  Positive for flankpain, Negative for fever chills.  As above all other systems are negative.    PAST MEDICAL HISTORY  Past Medical History:   Diagnosis Date   • Cold April 2017    upper respiratory infection   • Arrhythmia     PVCs and PACs, has had holter in the past   • ASTHMA     in the past, no problem now   • Colitis    • Dysmenorrhea    • Fibromyalgia    • Narcotic abuse (HCC)    • Ovarian cyst    • PID (pelvic inflammatory disease)    • Pregnancy    • Psychiatric problem     anxiety, ADHD   • PVC's (premature ventricular contractions)    • Urinary bladder disorder     frequent UTI's       FAMILY HISTORY  Family History   Problem Relation Age of Onset   • No Known Problems Mother    • No Known Problems Father         SOCIAL HISTORY  Social History     Tobacco Use   • Smoking status: Former Smoker     Packs/day: 0.50     Types: Cigarettes   • Smokeless tobacco: Never Used   • Tobacco comment: quit 4wks ago   Substance and Sexual Activity   • Alcohol use: No   • Drug use: No     Types: Oral     Comment: hx of opiate addiction, clean since 12/12/12   • Sexual activity: Not on file       SURGICAL HISTORY   has a past surgical history that includes other; primary c section (12/19/2010); hysteroscopy thermal ablation (N/A,  "7/10/2017); tubal coagulation laparoscopic bilateral (Bilateral, 7/10/2017); dilation and curettage (N/A, 7/10/2017); hysterectomy laparoscopy (N/A, 11/21/2019); salpingectomy (Bilateral, 11/21/2019); and cystoscopy (N/A, 11/21/2019).      CURRENT MEDICATIONS  Reviewed.  See Encounter Summary.  Include   No current facility-administered medications on file prior to encounter.      Current Outpatient Medications on File Prior to Encounter   Medication Sig Dispense Refill   • lamoTRIgine (LAMICTAL) 100 MG Tab Take 100 mg by mouth every day.     • oxyCODONE-acetaminophen (PERCOCET) 5-325 MG Tab Take 1 Tab by mouth every 6 hours as needed.     • ibuprofen (MOTRIN) 800 MG Tab Take 1 Tab by mouth every 8 hours as needed for Moderate Pain. 30 Tab 0   • ondansetron (ZOFRAN ODT) 4 MG TABLET DISPERSIBLE Take 1 Tab by mouth every 6 hours as needed for Nausea. 10 Tab 0       ALLERGIES  Allergies   Allergen Reactions   • Reglan [Kdc:Yellow Dye+Ci Pigment Blue 63+Metoclopramide]      Involuntary motor tick       PHYSICAL EXAM  VITAL SIGNS: /84   Pulse 71   Temp 36.2 °C (97.1 °F) (Temporal)   Resp 20   Ht 1.626 m (5' 4\")   Wt 107.4 kg (236 lb 12.4 oz)   LMP 11/20/2019   SpO2 94%   BMI 40.64 kg/m²   Constitutional:  Alert , able to answer questions  HENT: Nose is normal in appearance, external ears are normal,  moist mucous membranes  Eyes: Anicteric,  pupils are equal round and reactive, there is no conjunctival drainage or pallor   Neck: The trachea is midline, there is no obvious mass or meningeal signs  Cardiovascular: Good perfusion, regular rate and rhythm without murmurs gallops or rubs  Thorax & Lungs: Respiratory rate and effort are normal.   Abdomen: Abdomen is normal in appearance, no gross peritoneal signs  Musculoskeletal: No deformities noted in all 4 extremities. Actively moves all 4 extremities  Skin: Visualized skin is warm, no erythema, no rash.  Neurologic:  Cranial nerves II through XII are intact " there is no focal abnormality noted.  Psychiatric: Normal mood and mentation    RADIOLOGY/PROCEDURES  Imaging Studies:    CT-RENAL COLIC EVALUATION(A/P W/O)   Final Result      1.  There is no evidence of nephrolithiasis, urolithiasis, or hydronephrosis.   2.  There is no acute inflammatory process within the abdomen or pelvis.   3.  There is moderate transverse and right colonic stool.   4.  There is incidental 2 cm probable hemorrhagic cyst in the right ovary without free fluid.            Pertinent Labs   Results for orders placed or performed during the hospital encounter of 07/09/20   CBC WITH DIFFERENTIAL   Result Value Ref Range    WBC 8.5 4.8 - 10.8 K/uL    RBC 4.59 4.20 - 5.40 M/uL    Hemoglobin 14.2 12.0 - 16.0 g/dL    Hematocrit 41.8 37.0 - 47.0 %    MCV 91.1 81.4 - 97.8 fL    MCH 30.9 27.0 - 33.0 pg    MCHC 34.0 33.6 - 35.0 g/dL    RDW 42.5 35.9 - 50.0 fL    Platelet Count 200 164 - 446 K/uL    MPV 8.9 (L) 9.0 - 12.9 fL    Neutrophils-Polys 70.80 44.00 - 72.00 %    Lymphocytes 21.30 (L) 22.00 - 41.00 %    Monocytes 5.50 0.00 - 13.40 %    Eosinophils 1.40 0.00 - 6.90 %    Basophils 0.60 0.00 - 1.80 %    Immature Granulocytes 0.40 0.00 - 0.90 %    Nucleated RBC 0.00 /100 WBC    Neutrophils (Absolute) 6.00 2.00 - 7.15 K/uL    Lymphs (Absolute) 1.80 1.00 - 4.80 K/uL    Monos (Absolute) 0.47 0.00 - 0.85 K/uL    Eos (Absolute) 0.12 0.00 - 0.51 K/uL    Baso (Absolute) 0.05 0.00 - 0.12 K/uL    Immature Granulocytes (abs) 0.03 0.00 - 0.11 K/uL    NRBC (Absolute) 0.00 K/uL   COMP METABOLIC PANEL   Result Value Ref Range    Sodium 138 135 - 145 mmol/L    Potassium 4.2 3.6 - 5.5 mmol/L    Chloride 104 96 - 112 mmol/L    Co2 23 20 - 33 mmol/L    Anion Gap 11.0 7.0 - 16.0    Glucose 104 (H) 65 - 99 mg/dL    Bun 8 8 - 22 mg/dL    Creatinine 0.77 0.50 - 1.40 mg/dL    Calcium 9.2 8.4 - 10.2 mg/dL    AST(SGOT) 17 12 - 45 U/L    ALT(SGPT) 12 2 - 50 U/L    Alkaline Phosphatase 52 30 - 99 U/L    Total Bilirubin 0.5 0.1 - 1.5  mg/dL    Albumin 4.4 3.2 - 4.9 g/dL    Total Protein 7.0 6.0 - 8.2 g/dL    Globulin 2.6 1.9 - 3.5 g/dL    A-G Ratio 1.7 g/dL   LIPASE   Result Value Ref Range    Lipase 28 7 - 58 U/L   URINALYSIS CULTURE, IF INDICATED    Specimen: Urine   Result Value Ref Range    Color Orange     Character Clear     Specific Gravity 1.020 <1.035    Ph 5.0 5.0 - 8.0    Glucose See comment Negative mg/dL    Ketones See comment Negative mg/dL    Protein See comment Negative mg/dL    Bilirubin Negative Negative    Nitrite See comment Negative    Leukocyte Esterase See comment Negative    Occult Blood See comment Negative    Micro Urine Req Microscopic    URINE MICROSCOPIC (W/UA)   Result Value Ref Range    WBC 5-10 (A) /hpf    RBC 2-5 (A) /hpf    Bacteria Moderate (A) None /hpf    Epithelial Cells Many (A) Few /hpf    Mucous Threads Moderate /hpf   ESTIMATED GFR   Result Value Ref Range    GFR If African American >60 >60 mL/min/1.73 m 2    GFR If Non African American >60 >60 mL/min/1.73 m 2             COURSE & MEDICAL DECISION MAKING  Nursing notes and vital signs were reviewed. (See chart for details)  The patients prior  records were reviewed, history was obtained from the patient ;     The patient presents with flank pain, and the differential diagnosis includes but is not limited to ureteral stone, pyelonephritis, or associated musculoskeletal back pain .       Initial orders in the Emergency Department included CBC CMP urinalysis and CT renal colic and initial treatment in the Emergency Department included Toradol    ED testing reveals no evidence of an acute emergent condition.  Urine is negative for nitrite and leukocyte esterase she has no obstructive stone no evidence of pyelonephritis.  She is low risk for PE.  I do not know the etiology of her pain but at this time she will need to follow-up with her primary care physician she may need GI consult or urology consultation..  The patient was very frustrated she was not getting  narcotic pain medicine and I discussed with her without a definitive source I cannot prescribe this for her.  She will need to follow-up with her primary care for ongoing pain management but if the symptoms change she develops fever new or concerning symptoms she is always welcome to return for repeat evaluation      FINAL IMPRESSION  Flank pain  .        DISPOSITION  Home in stable condition     Patient has had high blood pressure while in the emergency department, felt likely secondary to medical condition. Counseled patient to monitor blood pressure at home and follow up with primary care physician.      DISPOSITION:  Patient will be discharged home in stable condition.    FOLLOW UP:  Florian Solis M.D.  [address] [place] [address] [address]  [facility] [address]  [phone]            OUTPATIENT MEDICATIONS:  Discharge Medication List as of 7/9/2020 12:18 PM            The patient was discharged home with an information sheet on flank pain and told to return immediately for any signs or symptoms listed, but specifically if intractable pain, or any worsening at all.  The patient verbally agreed to the discharge precautions and follow-up plan which is documented in EPIC.    Electronically signed by: Evangelina Hansen M.D., 7/9/2020 11:15 AM

## 2020-07-30 ENCOUNTER — HOSPITAL ENCOUNTER (OUTPATIENT)
Dept: RADIOLOGY | Facility: MEDICAL CENTER | Age: 35
End: 2020-07-30
Attending: INTERNAL MEDICINE
Payer: MEDICAID

## 2020-07-30 DIAGNOSIS — M54.17 RADICULOPATHY, LUMBOSACRAL REGION: ICD-10-CM

## 2020-07-30 PROCEDURE — 72148 MRI LUMBAR SPINE W/O DYE: CPT

## 2020-08-25 ENCOUNTER — APPOINTMENT (OUTPATIENT)
Dept: RADIOLOGY | Facility: MEDICAL CENTER | Age: 35
End: 2020-08-25
Attending: INTERNAL MEDICINE
Payer: MEDICAID

## 2020-09-03 ENCOUNTER — APPOINTMENT (OUTPATIENT)
Dept: RADIOLOGY | Facility: MEDICAL CENTER | Age: 35
End: 2020-09-03
Attending: INTERNAL MEDICINE
Payer: MEDICAID

## 2020-09-25 ENCOUNTER — HOSPITAL ENCOUNTER (EMERGENCY)
Facility: MEDICAL CENTER | Age: 35
End: 2020-09-25
Attending: EMERGENCY MEDICINE
Payer: MEDICAID

## 2020-09-25 VITALS
TEMPERATURE: 97.4 F | HEART RATE: 77 BPM | SYSTOLIC BLOOD PRESSURE: 130 MMHG | WEIGHT: 233.69 LBS | BODY MASS INDEX: 39.9 KG/M2 | DIASTOLIC BLOOD PRESSURE: 82 MMHG | RESPIRATION RATE: 20 BRPM | HEIGHT: 64 IN | OXYGEN SATURATION: 94 %

## 2020-09-25 DIAGNOSIS — M54.50 CHRONIC MIDLINE LOW BACK PAIN WITHOUT SCIATICA: ICD-10-CM

## 2020-09-25 DIAGNOSIS — G89.29 CHRONIC MIDLINE LOW BACK PAIN WITHOUT SCIATICA: ICD-10-CM

## 2020-09-25 LAB
ALBUMIN SERPL BCP-MCNC: 4.4 G/DL (ref 3.2–4.9)
ALBUMIN/GLOB SERPL: 1.6 G/DL
ALP SERPL-CCNC: 66 U/L (ref 30–99)
ALT SERPL-CCNC: 12 U/L (ref 2–50)
ANION GAP SERPL CALC-SCNC: 12 MMOL/L (ref 7–16)
APPEARANCE UR: CLEAR
AST SERPL-CCNC: 14 U/L (ref 12–45)
BASOPHILS # BLD AUTO: 0.6 % (ref 0–1.8)
BASOPHILS # BLD: 0.05 K/UL (ref 0–0.12)
BILIRUB SERPL-MCNC: 0.4 MG/DL (ref 0.1–1.5)
BILIRUB UR QL STRIP.AUTO: NEGATIVE
BUN SERPL-MCNC: 8 MG/DL (ref 8–22)
CALCIUM SERPL-MCNC: 9.1 MG/DL (ref 8.4–10.2)
CHLORIDE SERPL-SCNC: 104 MMOL/L (ref 96–112)
CO2 SERPL-SCNC: 25 MMOL/L (ref 20–33)
COLOR UR: YELLOW
CREAT SERPL-MCNC: 0.76 MG/DL (ref 0.5–1.4)
EOSINOPHIL # BLD AUTO: 0.04 K/UL (ref 0–0.51)
EOSINOPHIL NFR BLD: 0.5 % (ref 0–6.9)
ERYTHROCYTE [DISTWIDTH] IN BLOOD BY AUTOMATED COUNT: 41.5 FL (ref 35.9–50)
GLOBULIN SER CALC-MCNC: 2.8 G/DL (ref 1.9–3.5)
GLUCOSE SERPL-MCNC: 95 MG/DL (ref 65–99)
GLUCOSE UR STRIP.AUTO-MCNC: NEGATIVE MG/DL
HCG SERPL QL: NEGATIVE
HCT VFR BLD AUTO: 45.8 % (ref 37–47)
HGB BLD-MCNC: 15.3 G/DL (ref 12–16)
IMM GRANULOCYTES # BLD AUTO: 0.05 K/UL (ref 0–0.11)
IMM GRANULOCYTES NFR BLD AUTO: 0.6 % (ref 0–0.9)
KETONES UR STRIP.AUTO-MCNC: NEGATIVE MG/DL
LEUKOCYTE ESTERASE UR QL STRIP.AUTO: NEGATIVE
LIPASE SERPL-CCNC: 49 U/L (ref 7–58)
LYMPHOCYTES # BLD AUTO: 1.88 K/UL (ref 1–4.8)
LYMPHOCYTES NFR BLD: 22 % (ref 22–41)
MCH RBC QN AUTO: 30.8 PG (ref 27–33)
MCHC RBC AUTO-ENTMCNC: 33.4 G/DL (ref 33.6–35)
MCV RBC AUTO: 92.3 FL (ref 81.4–97.8)
MICRO URNS: NORMAL
MONOCYTES # BLD AUTO: 0.36 K/UL (ref 0–0.85)
MONOCYTES NFR BLD AUTO: 4.2 % (ref 0–13.4)
NEUTROPHILS # BLD AUTO: 6.17 K/UL (ref 2–7.15)
NEUTROPHILS NFR BLD: 72.1 % (ref 44–72)
NITRITE UR QL STRIP.AUTO: NEGATIVE
NRBC # BLD AUTO: 0 K/UL
NRBC BLD-RTO: 0 /100 WBC
PH UR STRIP.AUTO: 7.5 [PH] (ref 5–8)
PLATELET # BLD AUTO: 229 K/UL (ref 164–446)
PMV BLD AUTO: 9.4 FL (ref 9–12.9)
POTASSIUM SERPL-SCNC: 4.2 MMOL/L (ref 3.6–5.5)
PROT SERPL-MCNC: 7.2 G/DL (ref 6–8.2)
PROT UR QL STRIP: NEGATIVE MG/DL
RBC # BLD AUTO: 4.96 M/UL (ref 4.2–5.4)
RBC UR QL AUTO: NEGATIVE
SODIUM SERPL-SCNC: 141 MMOL/L (ref 135–145)
SP GR UR STRIP.AUTO: 1.01
WBC # BLD AUTO: 8.6 K/UL (ref 4.8–10.8)

## 2020-09-25 PROCEDURE — 85025 COMPLETE CBC W/AUTO DIFF WBC: CPT

## 2020-09-25 PROCEDURE — 83690 ASSAY OF LIPASE: CPT

## 2020-09-25 PROCEDURE — 80053 COMPREHEN METABOLIC PANEL: CPT

## 2020-09-25 PROCEDURE — 99284 EMERGENCY DEPT VISIT MOD MDM: CPT

## 2020-09-25 PROCEDURE — 36415 COLL VENOUS BLD VENIPUNCTURE: CPT

## 2020-09-25 PROCEDURE — 700111 HCHG RX REV CODE 636 W/ 250 OVERRIDE (IP): Performed by: EMERGENCY MEDICINE

## 2020-09-25 PROCEDURE — 81003 URINALYSIS AUTO W/O SCOPE: CPT

## 2020-09-25 PROCEDURE — 84703 CHORIONIC GONADOTROPIN ASSAY: CPT

## 2020-09-25 PROCEDURE — 96372 THER/PROPH/DIAG INJ SC/IM: CPT

## 2020-09-25 RX ORDER — OXYCODONE HYDROCHLORIDE 5 MG/1
5 TABLET ORAL 2 TIMES DAILY PRN
Status: ON HOLD | COMMUNITY
End: 2021-03-15

## 2020-09-25 RX ORDER — MORPHINE SULFATE 4 MG/ML
4 INJECTION, SOLUTION INTRAMUSCULAR; INTRAVENOUS ONCE
Status: COMPLETED | OUTPATIENT
Start: 2020-09-25 | End: 2020-09-25

## 2020-09-25 RX ORDER — CLONAZEPAM 0.5 MG/1
0.5 TABLET ORAL
Status: ON HOLD | COMMUNITY
Start: 2020-08-11 | End: 2021-03-15

## 2020-09-25 RX ADMIN — MORPHINE SULFATE 4 MG: 4 INJECTION INTRAVENOUS at 17:07

## 2020-09-25 ASSESSMENT — FIBROSIS 4 INDEX: FIB4 SCORE: 0.81

## 2020-09-25 NOTE — ED TRIAGE NOTES
"Presents with a history of ulcerative colitis, and recurring right ovarian cyst pain.  C/O exacerbating pain affecting her pelvic region, referred to her Lower back escalating for the past 3 days.   Chief Complaint   Patient presents with   • Cyst   • Pelvic Pain   • Low Back Pain     /91   Pulse 76   Temp 36.3 °C (97.4 °F) (Temporal)   Resp 20   Ht 1.626 m (5' 4\")   Wt 106 kg (233 lb 11 oz)   LMP 11/20/2019   SpO2 96%   BMI 40.11 kg/m²     "

## 2020-09-25 NOTE — ED NOTES
Med Rec completed per patient   Allergies reviewed  No ORAL antibiotics in last 14 days    Verified Clonazepam and Oxycodone

## 2020-09-25 NOTE — ED PROVIDER NOTES
ED Provider Note    This patient was cared for during the COVID-19 pandemic. History and physical exam may be limited/truncated by the inherent challenges of PPE and the need to decrease staff exposure to novel coronavirus. Some aspects of disease management may be different to protect staff and help slow the spread of disease. I verified that, if possible, the patient was wearing a mask and I was wearing appropriate PPE every time I encountered the patient.       CHIEF COMPLAINT  Chief Complaint   Patient presents with   • Cyst   • Pelvic Pain   • Low Back Pain       HPI  Yasemin Bradley is a 35 y.o. female who presents with acute on chronic back pain and abdominal pain.  Patient has history of ongoing back pain she has had recent MRIs.  She also says that she has ulcerative colitis but is yet to be scheduled for a colonoscopy.  She says she has been in bed for the last 4 days secondary to pain she says that she has pain all over mostly in her back and her abdomen.  She is not vomiting no fevers.  She has some chronic diarrhea no constipation.      REVIEW OF SYSTEMS  positive for back pain abdominal pain, negative for fevers. All other systems are negative.     PAST MEDICAL HISTORY   has a past medical history of Arrhythmia, ASTHMA, Cold (April 2017), Colitis, Dysmenorrhea, Fibromyalgia, Narcotic abuse (HCC), Ovarian cyst, PID (pelvic inflammatory disease), Pregnancy, Psychiatric problem, PVC's (premature ventricular contractions), and Urinary bladder disorder.    SOCIAL HISTORY  Social History     Tobacco Use   • Smoking status: Former Smoker     Types: Cigarettes   • Smokeless tobacco: Never Used   • Tobacco comment: quit 4wks ago   Substance and Sexual Activity   • Alcohol use: No   • Drug use: No     Types: Oral     Comment: hx of opiate addiction, clean since 12/12/12   • Sexual activity: Not on file       SURGICAL HISTORY   has a past surgical history that includes other; primary c section (12/19/2010);  "hysteroscopy thermal ablation (N/A, 7/10/2017); tubal coagulation laparoscopic bilateral (Bilateral, 7/10/2017); dilation and curettage (N/A, 7/10/2017); hysterectomy laparoscopy (N/A, 11/21/2019); salpingectomy (Bilateral, 11/21/2019); and cystoscopy (N/A, 11/21/2019).    CURRENT MEDICATIONS  Home Medications     Reviewed by Kevin Hobbs (Pharmacy Tech) on 09/25/20 at 1645  Med List Status: Complete   Medication Last Dose Status   clonazePAM (KLONOPIN) 0.5 MG Tab 9/24/2020 Active   ibuprofen (MOTRIN) 800 MG Tab 9/25/2020 Active   lamotrigine (LAMICTAL) 150 MG tablet 9/25/2020 Active   ondansetron (ZOFRAN ODT) 4 MG TABLET DISPERSIBLE 9/25/2020 Active   oxyCODONE immediate-release (ROXICODONE) 5 MG Tab 9/25/2020 Active                ALLERGIES  Allergies   Allergen Reactions   • Reglan [Kdc:Yellow Dye+Ci Pigment Blue 63+Metoclopramide]      Involuntary motor tick       PHYSICAL EXAM  VITAL SIGNS: /82   Pulse 77   Temp 36.3 °C (97.4 °F) (Temporal)   Resp 20   Ht 1.626 m (5' 4\")   Wt 106 kg (233 lb 11 oz)   LMP 11/20/2019   SpO2 94%   BMI 40.11 kg/m² .  Constitutional: Alert in no apparent distress.  HENT: No signs of trauma, Bilateral external ears normal, Nose normal.   Eyes: Pupils are equal and reactive, Conjunctiva normal, Non-icteric.   Neck: Normal range of motion, No tenderness, Supple, No stridor.   Cardiovascular: Regular rate and rhythm, no murmurs.   Thorax & Lungs: Normal breath sounds, No respiratory distress, No wheezing, No chest tenderness.   Abdomen: Bowel sounds normal, Soft, No tenderness, No masses, No peritoneal signs.  Skin: Warm, Dry, No erythema, No rash.   Back: No bony deformities  Musculoskeletal:  no major deformities noted.   Neurologic: Alert,  No focal deficits noted.   Psychiatric: Affect normal, Judgment normal, Mood normal.       DIAGNOSTIC STUDIES / PROCEDURES          LABS  Labs Reviewed   CBC WITH DIFFERENTIAL - Abnormal; Notable for the following " components:       Result Value    MCHC 33.4 (*)     Neutrophils-Polys 72.10 (*)     All other components within normal limits   COMP METABOLIC PANEL   LIPASE   URINALYSIS,CULTURE IF INDICATED   HCG QUAL SERUM   ESTIMATED GFR       RADIOLOGY  No orders to display       Medical records reviewed for continuity of care.  Patient had an MRI of the lumbar spine in the end of July which showed multilevel degenerative disease.  She had a CT scan in the beginning of July which showed a 2 cm hemorrhagic cyst without any free fluid.    Differential Diagnosis includes but is not limited to: Acute on chronic back pain, acute on chronic abdominal pain    COURSE & MEDICAL DECISION MAKING  Pertinent Labs & Imaging studies reviewed. (See chart for details)    This is a 35-year-old female that presents with back pain.   was reviewed and she is on chronic narcotic pain medications.  She is also on chronic anxiety medications.  Her labs are all within normal limits there is no elevated white count she is not anemic her LFTs are normal.  Recent MRI was reviewed and showed multilevel degenerative disease.  At this point I do not think there is anything acute to intervene upon today.   I will give her a shot of IM morphine and discharge her.     The patient will return for new or worsening symptoms and is stable at the time of discharge. Patient was given return precautions. Patient and/or family member verbalizes understanding and will comply.    DISPOSITION:  Patient will be discharged home in stable condition.    FOLLOW UP:  Florian Solis M.D.  [address] [place] [address] [address]  [place] [address]  [phone]    Schedule an appointment as soon as possible for a visit in 1 week      [facility], Emergency Dept  [address]  [place] [address]  [phone]    Worsening pain fevers chills or other concerns      OUTPATIENT MEDICATIONS:  Discharge Medication List as of 9/25/2020  5:12 PM            FINAL  IMPRESSION  1. Chronic midline low back pain without sciatica         2.   3.    This dictation has been creating using voice recognition software. The accuracy of the dictation is limited the abilities of the software.  I expect there may be some errors of grammar and possibly content. I made every attempt to manually correct the errors within my dictation. However errors related to this voice recognition software may still exist and should be interpreted within the appropriate context.      The note accurately reflects work and decisions made by me.  Tiffanie Gallardo M.D.  9/25/2020  10:17 PM

## 2020-09-26 NOTE — ED NOTES
Pt given discharge instructions. RN answered questions. VSS. Pt ambulated steadily out to [facility].

## 2020-10-23 ENCOUNTER — APPOINTMENT (OUTPATIENT)
Dept: RADIOLOGY | Facility: MEDICAL CENTER | Age: 35
End: 2020-10-23
Attending: INTERNAL MEDICINE
Payer: MEDICAID

## 2021-01-12 ENCOUNTER — HOSPITAL ENCOUNTER (EMERGENCY)
Facility: MEDICAL CENTER | Age: 36
End: 2021-01-12
Attending: EMERGENCY MEDICINE
Payer: MEDICAID

## 2021-01-12 ENCOUNTER — APPOINTMENT (OUTPATIENT)
Dept: RADIOLOGY | Facility: MEDICAL CENTER | Age: 36
End: 2021-01-12
Attending: EMERGENCY MEDICINE
Payer: MEDICAID

## 2021-01-12 VITALS
BODY MASS INDEX: 39.37 KG/M2 | OXYGEN SATURATION: 95 % | HEART RATE: 60 BPM | RESPIRATION RATE: 16 BRPM | TEMPERATURE: 97 F | WEIGHT: 230.6 LBS | SYSTOLIC BLOOD PRESSURE: 107 MMHG | DIASTOLIC BLOOD PRESSURE: 70 MMHG | HEIGHT: 64 IN

## 2021-01-12 DIAGNOSIS — Z20.822 SUSPECTED 2019 NOVEL CORONAVIRUS INFECTION: ICD-10-CM

## 2021-01-12 DIAGNOSIS — R53.1 WEAKNESS: ICD-10-CM

## 2021-01-12 DIAGNOSIS — M79.10 MYALGIA: ICD-10-CM

## 2021-01-12 LAB
ALBUMIN SERPL BCP-MCNC: 4.3 G/DL (ref 3.2–4.9)
ALBUMIN/GLOB SERPL: 1.8 G/DL
ALP SERPL-CCNC: 57 U/L (ref 30–99)
ALT SERPL-CCNC: 8 U/L (ref 2–50)
ANION GAP SERPL CALC-SCNC: 11 MMOL/L (ref 7–16)
APPEARANCE UR: CLEAR
AST SERPL-CCNC: 13 U/L (ref 12–45)
BASOPHILS # BLD AUTO: 0.4 % (ref 0–1.8)
BASOPHILS # BLD: 0.04 K/UL (ref 0–0.12)
BILIRUB SERPL-MCNC: 0.3 MG/DL (ref 0.1–1.5)
BILIRUB UR QL STRIP.AUTO: NEGATIVE
BUN SERPL-MCNC: 8 MG/DL (ref 8–22)
CALCIUM SERPL-MCNC: 9.3 MG/DL (ref 8.4–10.2)
CHLORIDE SERPL-SCNC: 106 MMOL/L (ref 96–112)
CO2 SERPL-SCNC: 24 MMOL/L (ref 20–33)
COLOR UR: YELLOW
CREAT SERPL-MCNC: 0.75 MG/DL (ref 0.5–1.4)
EOSINOPHIL # BLD AUTO: 0.06 K/UL (ref 0–0.51)
EOSINOPHIL NFR BLD: 0.6 % (ref 0–6.9)
ERYTHROCYTE [DISTWIDTH] IN BLOOD BY AUTOMATED COUNT: 42.8 FL (ref 35.9–50)
GLOBULIN SER CALC-MCNC: 2.4 G/DL (ref 1.9–3.5)
GLUCOSE SERPL-MCNC: 99 MG/DL (ref 65–99)
GLUCOSE UR STRIP.AUTO-MCNC: NEGATIVE MG/DL
HCT VFR BLD AUTO: 44.5 % (ref 37–47)
HGB BLD-MCNC: 14.7 G/DL (ref 12–16)
IMM GRANULOCYTES # BLD AUTO: 0.02 K/UL (ref 0–0.11)
IMM GRANULOCYTES NFR BLD AUTO: 0.2 % (ref 0–0.9)
KETONES UR STRIP.AUTO-MCNC: NEGATIVE MG/DL
LEUKOCYTE ESTERASE UR QL STRIP.AUTO: NEGATIVE
LIPASE SERPL-CCNC: 40 U/L (ref 7–58)
LYMPHOCYTES # BLD AUTO: 1.65 K/UL (ref 1–4.8)
LYMPHOCYTES NFR BLD: 17.4 % (ref 22–41)
MCH RBC QN AUTO: 30.9 PG (ref 27–33)
MCHC RBC AUTO-ENTMCNC: 33 G/DL (ref 33.6–35)
MCV RBC AUTO: 93.5 FL (ref 81.4–97.8)
MICRO URNS: ABNORMAL
MONOCYTES # BLD AUTO: 0.44 K/UL (ref 0–0.85)
MONOCYTES NFR BLD AUTO: 4.6 % (ref 0–13.4)
NEUTROPHILS # BLD AUTO: 7.27 K/UL (ref 2–7.15)
NEUTROPHILS NFR BLD: 76.8 % (ref 44–72)
NITRITE UR QL STRIP.AUTO: NEGATIVE
NRBC # BLD AUTO: 0 K/UL
NRBC BLD-RTO: 0 /100 WBC
PH UR STRIP.AUTO: 8.5 [PH] (ref 5–8)
PLATELET # BLD AUTO: 250 K/UL (ref 164–446)
PMV BLD AUTO: 9.4 FL (ref 9–12.9)
POTASSIUM SERPL-SCNC: 4.2 MMOL/L (ref 3.6–5.5)
PROT SERPL-MCNC: 6.7 G/DL (ref 6–8.2)
PROT UR QL STRIP: NEGATIVE MG/DL
RBC # BLD AUTO: 4.76 M/UL (ref 4.2–5.4)
RBC UR QL AUTO: NEGATIVE
SODIUM SERPL-SCNC: 141 MMOL/L (ref 135–145)
SP GR UR STRIP.AUTO: 1.02
WBC # BLD AUTO: 9.5 K/UL (ref 4.8–10.8)

## 2021-01-12 PROCEDURE — 96374 THER/PROPH/DIAG INJ IV PUSH: CPT

## 2021-01-12 PROCEDURE — 96375 TX/PRO/DX INJ NEW DRUG ADDON: CPT

## 2021-01-12 PROCEDURE — 700111 HCHG RX REV CODE 636 W/ 250 OVERRIDE (IP): Performed by: EMERGENCY MEDICINE

## 2021-01-12 PROCEDURE — 99284 EMERGENCY DEPT VISIT MOD MDM: CPT

## 2021-01-12 PROCEDURE — 700102 HCHG RX REV CODE 250 W/ 637 OVERRIDE(OP): Performed by: EMERGENCY MEDICINE

## 2021-01-12 PROCEDURE — 71045 X-RAY EXAM CHEST 1 VIEW: CPT

## 2021-01-12 PROCEDURE — 81003 URINALYSIS AUTO W/O SCOPE: CPT

## 2021-01-12 PROCEDURE — U0003 INFECTIOUS AGENT DETECTION BY NUCLEIC ACID (DNA OR RNA); SEVERE ACUTE RESPIRATORY SYNDROME CORONAVIRUS 2 (SARS-COV-2) (CORONAVIRUS DISEASE [COVID-19]), AMPLIFIED PROBE TECHNIQUE, MAKING USE OF HIGH THROUGHPUT TECHNOLOGIES AS DESCRIBED BY CMS-2020-01-R: HCPCS

## 2021-01-12 PROCEDURE — 85025 COMPLETE CBC W/AUTO DIFF WBC: CPT

## 2021-01-12 PROCEDURE — A9270 NON-COVERED ITEM OR SERVICE: HCPCS | Performed by: EMERGENCY MEDICINE

## 2021-01-12 PROCEDURE — 83690 ASSAY OF LIPASE: CPT

## 2021-01-12 PROCEDURE — U0005 INFEC AGEN DETEC AMPLI PROBE: HCPCS

## 2021-01-12 PROCEDURE — 80053 COMPREHEN METABOLIC PANEL: CPT

## 2021-01-12 RX ORDER — OXYCODONE HYDROCHLORIDE AND ACETAMINOPHEN 5; 325 MG/1; MG/1
1 TABLET ORAL ONCE
Status: COMPLETED | OUTPATIENT
Start: 2021-01-12 | End: 2021-01-12

## 2021-01-12 RX ORDER — MORPHINE SULFATE 4 MG/ML
4 INJECTION, SOLUTION INTRAMUSCULAR; INTRAVENOUS ONCE
Status: COMPLETED | OUTPATIENT
Start: 2021-01-12 | End: 2021-01-12

## 2021-01-12 RX ORDER — ONDANSETRON 2 MG/ML
4 INJECTION INTRAMUSCULAR; INTRAVENOUS ONCE
Status: COMPLETED | OUTPATIENT
Start: 2021-01-12 | End: 2021-01-12

## 2021-01-12 RX ADMIN — MORPHINE SULFATE 4 MG: 4 INJECTION INTRAVENOUS at 17:44

## 2021-01-12 RX ADMIN — ONDANSETRON 4 MG: 2 INJECTION INTRAMUSCULAR; INTRAVENOUS at 17:43

## 2021-01-12 RX ADMIN — OXYCODONE HYDROCHLORIDE AND ACETAMINOPHEN 1 TABLET: 5; 325 TABLET ORAL at 19:05

## 2021-01-12 ASSESSMENT — FIBROSIS 4 INDEX: FIB4 SCORE: 0.58

## 2021-01-12 NOTE — ED TRIAGE NOTES
Chief Complaint   Patient presents with   • Generalized Body Aches      pt complains of generalized body aches for the past 3 days, progressively worse. Pt was escorted to triage via WC.

## 2021-01-13 LAB
SARS-COV-2 RNA RESP QL NAA+PROBE: NOTDETECTED
SPECIMEN SOURCE: NORMAL

## 2021-01-13 NOTE — ED PROVIDER NOTES
ED Provider Note        Primary care provider: Florian Solis M.D.    I verified that the patient was wearing a mask and I was wearing appropriate PPE every time I entered the room. The patient's mask was on the patient at all times during my encounter except for a brief view of the oropharynx.      CHIEF COMPLAINT  Chief Complaint   Patient presents with   • Generalized Body Aches       HPI  Yasemin Bradley is a 35 y.o. female who presents to the Emergency Department with chief complaint of generalized body aches.  Patient's had progressive symptoms over the last 3 days.  She stated that it started as minor aches in her shoulders she now has aches and pains in all the joints in her body and her muscles in her upper and lower extremities and also minimally in the abdomen primarily in the right upper quadrant.  Patient's been taking 800 mg of ibuprofen twice daily as well as a gram of Tylenol twice daily.  She states that this helps with the symptoms but they have been coming back progressively worse.  Minor nausea no emesis minor chills no fever no altered mental status she has minor frontal headache.  She denies any cough or shortness of breath she denies any change in taste or smell no urinary or stooling issues no other acute symptoms or concerns at this time she denies known exposure to COVID-19.    REVIEW OF SYSTEMS  10 systems reviewed and otherwise negative, pertinent positives and negatives listed in the history of present illness.    PAST MEDICAL HISTORY   has a past medical history of Arrhythmia, ASTHMA, Cold (April 2017), Colitis, Dysmenorrhea, Fibromyalgia, Narcotic abuse (HCC), Ovarian cyst, PID (pelvic inflammatory disease), Pregnancy, Psychiatric problem, PVC's (premature ventricular contractions), and Urinary bladder disorder.    SURGICAL HISTORY   has a past surgical history that includes other; primary c section (12/19/2010); hysteroscopy thermal ablation (N/A, 7/10/2017); tubal coagulation  "laparoscopic bilateral (Bilateral, 7/10/2017); dilation and curettage (N/A, 7/10/2017); hysterectomy laparoscopy (N/A, 11/21/2019); salpingectomy (Bilateral, 11/21/2019); and cystoscopy (N/A, 11/21/2019).    SOCIAL HISTORY  Social History     Tobacco Use   • Smoking status: Former Smoker     Types: Cigarettes   • Smokeless tobacco: Never Used   • Tobacco comment: quit 4wks ago   Substance Use Topics   • Alcohol use: No   • Drug use: No     Types: Oral     Comment: hx of opiate addiction, clean since 12/12/12      Social History     Substance and Sexual Activity   Drug Use No   • Types: Oral    Comment: hx of opiate addiction, clean since 12/12/12       FAMILY HISTORY  Non-Contributory    CURRENT MEDICATIONS  Home Medications    **Home medications have not yet been reviewed for this encounter**         ALLERGIES  Allergies   Allergen Reactions   • Reglan [Kdc:Yellow Dye+Ci Pigment Blue 63+Metoclopramide]      Involuntary motor tick       PHYSICAL EXAM  VITAL SIGNS: /77   Pulse 71   Temp 36.1 °C (97 °F) (Temporal)   Resp 16   Ht 1.626 m (5' 4\")   Wt 104.6 kg (230 lb 9.6 oz)   LMP 11/20/2019   SpO2 98%   BMI 39.58 kg/m²   Pulse ox interpretation: I interpret this pulse ox as normal.  Constitutional: Alert and oriented x 3, minimal distress  HEENT: Atraumatic normocephalic, pupils are equal round reactive to light extraocular movements are intact. The nares is clear, external ears are normal, mouth shows moist mucous membranes  Neck: Supple, no JVD no tracheal deviation  Cardiovascular: Regular rate and rhythm no murmur rub or gallop 2+ pulses peripherally x4  Thorax & Lungs: No respiratory distress, no wheezes rales or rhonchi, No chest tenderness.   GI: Soft nontender nondistended positive bowel sounds, no peritoneal signs  Skin: Warm dry no acute rash or lesion  Musculoskeletal: Moving all extremities with full range and 5 of 5 strength, no acute  deformity  Neurologic: Cranial nerves III through XII " are grossly intact, no sensory deficit, no cerebellar dysfunction   Psychiatric: Appropriate affect for situation at this time      DIAGNOSTIC STUDIES / PROCEDURES  LABS      Results for orders placed or performed during the hospital encounter of 01/12/21   CBC WITH DIFFERENTIAL   Result Value Ref Range    WBC 9.5 4.8 - 10.8 K/uL    RBC 4.76 4.20 - 5.40 M/uL    Hemoglobin 14.7 12.0 - 16.0 g/dL    Hematocrit 44.5 37.0 - 47.0 %    MCV 93.5 81.4 - 97.8 fL    MCH 30.9 27.0 - 33.0 pg    MCHC 33.0 (L) 33.6 - 35.0 g/dL    RDW 42.8 35.9 - 50.0 fL    Platelet Count 250 164 - 446 K/uL    MPV 9.4 9.0 - 12.9 fL    Neutrophils-Polys 76.80 (H) 44.00 - 72.00 %    Lymphocytes 17.40 (L) 22.00 - 41.00 %    Monocytes 4.60 0.00 - 13.40 %    Eosinophils 0.60 0.00 - 6.90 %    Basophils 0.40 0.00 - 1.80 %    Immature Granulocytes 0.20 0.00 - 0.90 %    Nucleated RBC 0.00 /100 WBC    Neutrophils (Absolute) 7.27 (H) 2.00 - 7.15 K/uL    Lymphs (Absolute) 1.65 1.00 - 4.80 K/uL    Monos (Absolute) 0.44 0.00 - 0.85 K/uL    Eos (Absolute) 0.06 0.00 - 0.51 K/uL    Baso (Absolute) 0.04 0.00 - 0.12 K/uL    Immature Granulocytes (abs) 0.02 0.00 - 0.11 K/uL    NRBC (Absolute) 0.00 K/uL   COMP METABOLIC PANEL   Result Value Ref Range    Sodium 141 135 - 145 mmol/L    Potassium 4.2 3.6 - 5.5 mmol/L    Chloride 106 96 - 112 mmol/L    Co2 24 20 - 33 mmol/L    Anion Gap 11.0 7.0 - 16.0    Glucose 99 65 - 99 mg/dL    Bun 8 8 - 22 mg/dL    Creatinine 0.75 0.50 - 1.40 mg/dL    Calcium 9.3 8.4 - 10.2 mg/dL    AST(SGOT) 13 12 - 45 U/L    ALT(SGPT) 8 2 - 50 U/L    Alkaline Phosphatase 57 30 - 99 U/L    Total Bilirubin 0.3 0.1 - 1.5 mg/dL    Albumin 4.3 3.2 - 4.9 g/dL    Total Protein 6.7 6.0 - 8.2 g/dL    Globulin 2.4 1.9 - 3.5 g/dL    A-G Ratio 1.8 g/dL   LIPASE   Result Value Ref Range    Lipase 40 7 - 58 U/L   URINALYSIS    Specimen: Urine   Result Value Ref Range    Color Yellow     Character Clear     Specific Gravity 1.020 <1.035    Ph 8.5 (A) 5.0 - 8.0     "Glucose Negative Negative mg/dL    Ketones Negative Negative mg/dL    Protein Negative Negative mg/dL    Bilirubin Negative Negative    Nitrite Negative Negative    Leukocyte Esterase Negative Negative    Occult Blood Negative Negative    Micro Urine Req see below    SARS-CoV-2 PCR (24 hour In-House): Collect NP swab in VTM    Specimen: Respirate   Result Value Ref Range    SARS-CoV-2 Source NP Swab    ESTIMATED GFR   Result Value Ref Range    GFR If African American >60 >60 mL/min/1.73 m 2    GFR If Non African American >60 >60 mL/min/1.73 m 2       All labs reviewed by me.      RADIOLOGY  DX-CHEST-PORTABLE (1 VIEW)   Final Result      No acute cardiopulmonary abnormality.        The radiologist's interpretation of all radiological studies have been reviewed by me.    COURSE & MEDICAL DECISION MAKING  Pertinent Labs & Imaging studies reviewed. (See chart for details)    4:57 PM - Patient seen and examined at bedside.         Patient noted to have slightly elevated blood pressure likely circumstantial secondary to presenting complaint. Referred to primary care physician for further evaluation.      Medical Decision Making: Chest x-ray is clear labs as above are reassuring.  Patient constellation of symptoms are's concerning for possible COVID-19.  She is given appropriate isolation precautions.  She is instructed to follow-up in my chart in 24 to 48 hours for Covid results.  Otherwise her abdominal exam is benign her vital signs are improved her labs are reassuring.  She understands to return for any worsening pain chest pain shortness of breath abdominal pain any other acute symptoms or concerns she is otherwise discharged in stable and improved condition.    /77   Pulse 71   Temp 36.1 °C (97 °F) (Temporal)   Resp 16   Ht 1.626 m (5' 4\")   Wt 104.6 kg (230 lb 9.6 oz)   LMP 11/20/2019   SpO2 98%   BMI 39.58 kg/m²     Manny Jain D.O.  [address]  [address]  [place] [id number] [phone]    In 2 " days  If symptoms worsen    [facility], Emergency Dept  [address]  [place] [id number] [phone]    in 12-24 hours if symptoms persist, immediately If symptoms worsen, or if you develop any other symptoms or concerns      Discharge Medication List as of 1/12/2021  7:06 PM          FINAL IMPRESSION  1. Weakness Active   2. Myalgia Active   3. Suspected 2019 novel coronavirus infection Active          This dictation has been created using voice recognition software and/or scribes. The accuracy of the dictation is limited by the abilities of the software and the expertise of the scribes. I expect there may be some errors of grammar and possibly content. I made every attempt to manually correct the errors within my dictation. However, errors related to voice recognition software and/or scribes may still exist and should be interpreted within the appropriate context.

## 2021-01-13 NOTE — DISCHARGE INSTRUCTIONS
Your evaluation today is reassuring.  There is some suspicion that you may be suffering from an infection of the COVID-19 virus.  You should return to the emergency department immediately should you have worsening fevers not responsive to Tylenol, should you have worsening cough, chest pain, shortness of breath, any other acute symptoms or concerns otherwise the following medications are over-the-counter and may help ease symptoms and duration of illness.    Vitamin C 500 mg twice daily  Zinc 75 to 100 mg/day  Melatonin 5 mg at bedtime  Vitamin D3 2000 to 4000 units/day  Aspirin  milligrams daily  Pepcid 20 mg/day  Acetaminophen 650 mg every 8 hours as needed for fever/pain  Ibuprofen 600 mg every 8 hours as needed for fever/pain

## 2021-01-13 NOTE — ED NOTES
Agreeable to discharge.  Follow up instructions discussed and understanding verbalized.  Ambulatory out of ED.

## 2021-02-26 ENCOUNTER — HOSPITAL ENCOUNTER (EMERGENCY)
Facility: MEDICAL CENTER | Age: 36
End: 2021-02-26
Payer: MEDICAID

## 2021-02-26 PROCEDURE — 302449 STATCHG TRIAGE ONLY (STATISTIC)

## 2021-02-26 NOTE — ED NOTES
Patient registered in error. Sent by couples counselor, adamantly denying SI and HI. Does not want to be seen be a doctor, she has no medical complaints.

## 2021-03-01 ENCOUNTER — HOSPITAL ENCOUNTER (EMERGENCY)
Facility: MEDICAL CENTER | Age: 36
End: 2021-03-01
Attending: EMERGENCY MEDICINE
Payer: MEDICAID

## 2021-03-01 VITALS
TEMPERATURE: 98.6 F | HEART RATE: 90 BPM | WEIGHT: 233 LBS | SYSTOLIC BLOOD PRESSURE: 120 MMHG | RESPIRATION RATE: 18 BRPM | OXYGEN SATURATION: 95 % | DIASTOLIC BLOOD PRESSURE: 82 MMHG | BODY MASS INDEX: 39.99 KG/M2

## 2021-03-01 DIAGNOSIS — M54.50 LUMBAR BACK PAIN: ICD-10-CM

## 2021-03-01 LAB
ALBUMIN SERPL BCP-MCNC: 4.1 G/DL (ref 3.2–4.9)
ALBUMIN/GLOB SERPL: 1.8 G/DL
ALP SERPL-CCNC: 58 U/L (ref 30–99)
ALT SERPL-CCNC: 11 U/L (ref 2–50)
ANION GAP SERPL CALC-SCNC: 10 MMOL/L (ref 7–16)
AST SERPL-CCNC: 16 U/L (ref 12–45)
BASOPHILS # BLD AUTO: 0.5 % (ref 0–1.8)
BASOPHILS # BLD: 0.04 K/UL (ref 0–0.12)
BILIRUB SERPL-MCNC: 0.4 MG/DL (ref 0.1–1.5)
BUN SERPL-MCNC: 9 MG/DL (ref 8–22)
CALCIUM SERPL-MCNC: 8.9 MG/DL (ref 8.4–10.2)
CHLORIDE SERPL-SCNC: 103 MMOL/L (ref 96–112)
CO2 SERPL-SCNC: 24 MMOL/L (ref 20–33)
CREAT SERPL-MCNC: 0.7 MG/DL (ref 0.5–1.4)
EOSINOPHIL # BLD AUTO: 0.1 K/UL (ref 0–0.51)
EOSINOPHIL NFR BLD: 1.3 % (ref 0–6.9)
ERYTHROCYTE [DISTWIDTH] IN BLOOD BY AUTOMATED COUNT: 42.2 FL (ref 35.9–50)
GLOBULIN SER CALC-MCNC: 2.3 G/DL (ref 1.9–3.5)
GLUCOSE SERPL-MCNC: 92 MG/DL (ref 65–99)
HCT VFR BLD AUTO: 42.9 % (ref 37–47)
HGB BLD-MCNC: 14.3 G/DL (ref 12–16)
IMM GRANULOCYTES # BLD AUTO: 0.02 K/UL (ref 0–0.11)
IMM GRANULOCYTES NFR BLD AUTO: 0.3 % (ref 0–0.9)
LYMPHOCYTES # BLD AUTO: 2.1 K/UL (ref 1–4.8)
LYMPHOCYTES NFR BLD: 26.7 % (ref 22–41)
MCH RBC QN AUTO: 30.6 PG (ref 27–33)
MCHC RBC AUTO-ENTMCNC: 33.3 G/DL (ref 33.6–35)
MCV RBC AUTO: 91.9 FL (ref 81.4–97.8)
MONOCYTES # BLD AUTO: 0.41 K/UL (ref 0–0.85)
MONOCYTES NFR BLD AUTO: 5.2 % (ref 0–13.4)
NEUTROPHILS # BLD AUTO: 5.2 K/UL (ref 2–7.15)
NEUTROPHILS NFR BLD: 66 % (ref 44–72)
NRBC # BLD AUTO: 0 K/UL
NRBC BLD-RTO: 0 /100 WBC
PLATELET # BLD AUTO: 222 K/UL (ref 164–446)
PMV BLD AUTO: 9.6 FL (ref 9–12.9)
POTASSIUM SERPL-SCNC: 4 MMOL/L (ref 3.6–5.5)
PROT SERPL-MCNC: 6.4 G/DL (ref 6–8.2)
RBC # BLD AUTO: 4.67 M/UL (ref 4.2–5.4)
SODIUM SERPL-SCNC: 137 MMOL/L (ref 135–145)
WBC # BLD AUTO: 7.9 K/UL (ref 4.8–10.8)

## 2021-03-01 PROCEDURE — 700111 HCHG RX REV CODE 636 W/ 250 OVERRIDE (IP): Performed by: EMERGENCY MEDICINE

## 2021-03-01 PROCEDURE — 700102 HCHG RX REV CODE 250 W/ 637 OVERRIDE(OP): Performed by: EMERGENCY MEDICINE

## 2021-03-01 PROCEDURE — 80053 COMPREHEN METABOLIC PANEL: CPT

## 2021-03-01 PROCEDURE — A9270 NON-COVERED ITEM OR SERVICE: HCPCS | Performed by: EMERGENCY MEDICINE

## 2021-03-01 PROCEDURE — 85025 COMPLETE CBC W/AUTO DIFF WBC: CPT

## 2021-03-01 PROCEDURE — 96376 TX/PRO/DX INJ SAME DRUG ADON: CPT

## 2021-03-01 PROCEDURE — 99284 EMERGENCY DEPT VISIT MOD MDM: CPT

## 2021-03-01 PROCEDURE — 96375 TX/PRO/DX INJ NEW DRUG ADDON: CPT

## 2021-03-01 PROCEDURE — 96374 THER/PROPH/DIAG INJ IV PUSH: CPT

## 2021-03-01 RX ORDER — HYDROMORPHONE HYDROCHLORIDE 1 MG/ML
1 INJECTION, SOLUTION INTRAMUSCULAR; INTRAVENOUS; SUBCUTANEOUS ONCE
Status: COMPLETED | OUTPATIENT
Start: 2021-03-01 | End: 2021-03-01

## 2021-03-01 RX ORDER — DEXAMETHASONE SODIUM PHOSPHATE 4 MG/ML
8 INJECTION, SOLUTION INTRA-ARTICULAR; INTRALESIONAL; INTRAMUSCULAR; INTRAVENOUS; SOFT TISSUE ONCE
Status: COMPLETED | OUTPATIENT
Start: 2021-03-01 | End: 2021-03-01

## 2021-03-01 RX ORDER — ONDANSETRON 2 MG/ML
4 INJECTION INTRAMUSCULAR; INTRAVENOUS ONCE
Status: COMPLETED | OUTPATIENT
Start: 2021-03-01 | End: 2021-03-01

## 2021-03-01 RX ORDER — KETOROLAC TROMETHAMINE 30 MG/ML
15 INJECTION, SOLUTION INTRAMUSCULAR; INTRAVENOUS ONCE
Status: COMPLETED | OUTPATIENT
Start: 2021-03-01 | End: 2021-03-01

## 2021-03-01 RX ORDER — OXYCODONE HYDROCHLORIDE AND ACETAMINOPHEN 5; 325 MG/1; MG/1
2 TABLET ORAL ONCE
Status: COMPLETED | OUTPATIENT
Start: 2021-03-01 | End: 2021-03-01

## 2021-03-01 RX ADMIN — HYDROMORPHONE HYDROCHLORIDE 1 MG: 1 INJECTION, SOLUTION INTRAMUSCULAR; INTRAVENOUS; SUBCUTANEOUS at 20:59

## 2021-03-01 RX ADMIN — HYDROMORPHONE HYDROCHLORIDE 1 MG: 1 INJECTION, SOLUTION INTRAMUSCULAR; INTRAVENOUS; SUBCUTANEOUS at 18:09

## 2021-03-01 RX ADMIN — KETOROLAC TROMETHAMINE 15 MG: 30 INJECTION, SOLUTION INTRAMUSCULAR at 18:09

## 2021-03-01 RX ADMIN — DEXAMETHASONE SODIUM PHOSPHATE 8 MG: 4 INJECTION, SOLUTION INTRA-ARTICULAR; INTRALESIONAL; INTRAMUSCULAR; INTRAVENOUS; SOFT TISSUE at 20:43

## 2021-03-01 RX ADMIN — OXYCODONE HYDROCHLORIDE AND ACETAMINOPHEN 2 TABLET: 5; 325 TABLET ORAL at 19:39

## 2021-03-01 RX ADMIN — ONDANSETRON 4 MG: 2 INJECTION INTRAMUSCULAR; INTRAVENOUS at 18:09

## 2021-03-01 ASSESSMENT — FIBROSIS 4 INDEX: FIB4 SCORE: 0.72

## 2021-03-02 NOTE — ED TRIAGE NOTES
Pt was going to dr to get trigger point injections in her lower back. Pt states the dr did an US prior to injections and was told she has a bad herniated disc and to get to a hospital. Denies problems peeing or pooping.

## 2021-03-02 NOTE — ED PROVIDER NOTES
ED Provider Note    CHIEF COMPLAINT  Chief Complaint   Patient presents with   • Back Pain       HPI  Yasemin Bradley is a 35 y.o. female who presents for evaluation of severe refractory back pain.  The patient has a complex medical history as listed below.  The patient was apparently sent over from her pain management physician.  She had been getting pain medicine there and was scheduled for trigger point injections.  She reports that she was in the waiting room for over an hour and her back pain became unbearable.  They were unable to do trigger point injections.  She was briefly evaluated by her pain management doctor who was concerned about herniated disc and sent her here for further evaluation.  She reports pain going down the right leg as well as reported paresthesias.  She denies any bowel or bladder incontinence.  She has had prior lumbar surgery.  No fevers or chills or dysuria or hematuria    REVIEW OF SYSTEMS  See HPI for further details.  No incontinence numbness tingling weakness all other systems are negative.     PAST MEDICAL HISTORY  Past Medical History:   Diagnosis Date   • Cold April 2017    upper respiratory infection   • Arrhythmia     PVCs and PACs, has had holter in the past   • ASTHMA     in the past, no problem now   • Colitis    • Dysmenorrhea    • Fibromyalgia    • Narcotic abuse (HCC)    • Ovarian cyst    • PID (pelvic inflammatory disease)    • Pregnancy    • Psychiatric problem     anxiety, ADHD   • PVC's (premature ventricular contractions)    • Urinary bladder disorder     frequent UTI's       FAMILY HISTORY  Noncontributory    SOCIAL HISTORY  Social History     Socioeconomic History   • Marital status: Single     Spouse name: Not on file   • Number of children: Not on file   • Years of education: Not on file   • Highest education level: Not on file   Occupational History   • Not on file   Tobacco Use   • Smoking status: Former Smoker     Types: Cigarettes   • Smokeless tobacco:  Never Used   • Tobacco comment: quit 4wks ago   Substance and Sexual Activity   • Alcohol use: No   • Drug use: No     Types: Oral     Comment: hx of opiate addiction, clean since 12/12/12   • Sexual activity: Not on file   Other Topics Concern   • Not on file   Social History Narrative   • Not on file     Social Determinants of Health     Financial Resource Strain:    • Difficulty of Paying Living Expenses:    Food Insecurity:    • Worried About Running Out of Food in the Last Year:    • Ran Out of Food in the Last Year:    Transportation Needs:    • Lack of Transportation (Medical):    • Lack of Transportation (Non-Medical):    Physical Activity:    • Days of Exercise per Week:    • Minutes of Exercise per Session:    Stress:    • Feeling of Stress :    Social Connections:    • Frequency of Communication with Friends and Family:    • Frequency of Social Gatherings with Friends and Family:    • Attends [employer] Services:    • Active Member of Clubs or Organizations:    • Attends Club or Organization Meetings:    • Marital Status:    Intimate Partner Violence:    • Fear of Current or Ex-Partner:    • Emotionally Abused:    • Physically Abused:    • Sexually Abused:        SURGICAL HISTORY  Past Surgical History:   Procedure Laterality Date   • HYSTERECTOMY LAPAROSCOPY N/A 11/21/2019    Procedure: HYSTERECTOMY, LAPAROSCOPIC;  Surgeon: Jericho Du M.D.;  Location: SURGERY SAME DAY [facility] ORS;  Service: Gynecology   • SALPINGECTOMY Bilateral 11/21/2019    Procedure: SALPINGECTOMY;  Surgeon: Jericho Du M.D.;  Location: SURGERY SAME DAY [facility] ORS;  Service: Gynecology   • CYSTOSCOPY N/A 11/21/2019    Procedure: CYSTOSCOPY;  Surgeon: Jericho Du M.D.;  Location: SURGERY SAME DAY [facility] ORS;  Service: Gynecology   • HYSTEROSCOPY THERMAL ABLATION N/A 7/10/2017    Procedure: HYSTEROSCOPY THERMAL ABLATION endometrial ;  Surgeon: Jericho Du M.D.;  Location: SURGERY SAME DAY [facility] ORS;  Service:     • TUBAL COAGULATION LAPAROSCOPIC BILATERAL Bilateral 7/10/2017    Procedure: TUBAL COAGULATION LAPAROSCOPIC BILATERAL;  Surgeon: Jericho Fields M.D.;  Location: SURGERY SAME DAY [facility] ORS;  Service:    • DILATION AND CURETTAGE N/A 7/10/2017    Procedure: DILATION AND CURETTAGE;  Surgeon: Jericho Fields M.D.;  Location: SURGERY SAME DAY [facility] ORS;  Service:    • PRIMARY C SECTION  12/19/2010    Performed by JERICHO FIELDS at LABOR AND DELIVERY   • OTHER       T & A  age 14        CURRENT MEDICATIONS    Current Facility-Administered Medications:   •  ondansetron (ZOFRAN) syringe/vial injection 4 mg, 4 mg, Intravenous, Once, Wilber Castelan M.D.  •  HYDROmorphone pf (DILAUDID) injection 1 mg, 1 mg, Intravenous, Once, Wilber Castelan M.D.  •  ketorolac (TORADOL) injection 15 mg, 15 mg, Intravenous, Once, Wilber Castelan M.D.    Current Outpatient Medications:   •  clonazePAM (KLONOPIN) 0.5 MG Tab, Take 0.5 mg by mouth 1 time daily as needed., Disp: , Rfl:   •  oxyCODONE immediate-release (ROXICODONE) 5 MG Tab, Take 5 mg by mouth 2 times a day as needed for Severe Pain., Disp: , Rfl:   •  lamotrigine (LAMICTAL) 150 MG tablet, Take 150 mg by mouth every day., Disp: , Rfl:   •  ibuprofen (MOTRIN) 800 MG Tab, Take 1 Tab by mouth every 8 hours as needed for Moderate Pain., Disp: 30 Tab, Rfl: 0  •  ondansetron (ZOFRAN ODT) 4 MG TABLET DISPERSIBLE, Take 1 Tab by mouth every 6 hours as needed for Nausea., Disp: 10 Tab, Rfl: 0      ALLERGIES  Allergies   Allergen Reactions   • Reglan [Kdc:Yellow Dye+Ci Pigment Blue 63+Metoclopramide]      Involuntary motor tick       PHYSICAL EXAM  VITAL SIGNS: /81   Pulse 95   Temp 36.9 °C (98.5 °F) (Temporal)   Resp 16   LMP 11/20/2019   SpO2 94%       Constitutional: Tearful  HENT: Normocephalic, Atraumatic, Bilateral external ears normal, Oropharynx moist, No oral exudates, Nose normal.   Eyes: PERRLA, EOMI, Conjunctiva normal, No discharge.   Neck: Normal range  of motion, No tenderness, Supple, No stridor.   Cardiovascular: Normal heart rate, Normal rhythm, No murmurs, No rubs, No gallops.   Thorax & Lungs: Normal breath sounds, No respiratory distress, No wheezing, No chest tenderness.   Abdomen: Bowel sounds normal, Soft, No tenderness, No masses, No pulsatile masses.   Skin: Warm, Dry, No erythema, No rash.   Back: No midline thoracic or lumbar bony tenderness exquisite tenderness noted over the right superior buttock over the sciatic foramen, No CVA tenderness.   Genitalia: Groin sensation is normal   extremities: Intact distal pulses, No edema, No tenderness, No cyanosis, No clubbing.   Neurologic: Alert & oriented x 3, Normal motor function, Normal sensory function, No focal deficits noted.   Psychiatric: Anxious    Results for orders placed or performed during the hospital encounter of 03/01/21   CBC WITH DIFFERENTIAL   Result Value Ref Range    WBC 7.9 4.8 - 10.8 K/uL    RBC 4.67 4.20 - 5.40 M/uL    Hemoglobin 14.3 12.0 - 16.0 g/dL    Hematocrit 42.9 37.0 - 47.0 %    MCV 91.9 81.4 - 97.8 fL    MCH 30.6 27.0 - 33.0 pg    MCHC 33.3 (L) 33.6 - 35.0 g/dL    RDW 42.2 35.9 - 50.0 fL    Platelet Count 222 164 - 446 K/uL    MPV 9.6 9.0 - 12.9 fL    Neutrophils-Polys 66.00 44.00 - 72.00 %    Lymphocytes 26.70 22.00 - 41.00 %    Monocytes 5.20 0.00 - 13.40 %    Eosinophils 1.30 0.00 - 6.90 %    Basophils 0.50 0.00 - 1.80 %    Immature Granulocytes 0.30 0.00 - 0.90 %    Nucleated RBC 0.00 /100 WBC    Neutrophils (Absolute) 5.20 2.00 - 7.15 K/uL    Lymphs (Absolute) 2.10 1.00 - 4.80 K/uL    Monos (Absolute) 0.41 0.00 - 0.85 K/uL    Eos (Absolute) 0.10 0.00 - 0.51 K/uL    Baso (Absolute) 0.04 0.00 - 0.12 K/uL    Immature Granulocytes (abs) 0.02 0.00 - 0.11 K/uL    NRBC (Absolute) 0.00 K/uL   Comp Metabolic Panel   Result Value Ref Range    Sodium 137 135 - 145 mmol/L    Potassium 4.0 3.6 - 5.5 mmol/L    Chloride 103 96 - 112 mmol/L    Co2 24 20 - 33 mmol/L    Anion Gap 10.0 7.0  - 16.0    Glucose 92 65 - 99 mg/dL    Bun 9 8 - 22 mg/dL    Creatinine 0.70 0.50 - 1.40 mg/dL    Calcium 8.9 8.4 - 10.2 mg/dL    AST(SGOT) 16 12 - 45 U/L    ALT(SGPT) 11 2 - 50 U/L    Alkaline Phosphatase 58 30 - 99 U/L    Total Bilirubin 0.4 0.1 - 1.5 mg/dL    Albumin 4.1 3.2 - 4.9 g/dL    Total Protein 6.4 6.0 - 8.2 g/dL    Globulin 2.3 1.9 - 3.5 g/dL    A-G Ratio 1.8 g/dL   ESTIMATED GFR   Result Value Ref Range    GFR If African American >60 >60 mL/min/1.73 m 2    GFR If Non African American >60 >60 mL/min/1.73 m 2        COURSE & MEDICAL DECISION MAKING  Pertinent Labs & Imaging studies reviewed. (See chart for details)  An IV was established.  The patient was given parenteral pain and nausea medicine as well as Toradol and a large dose of Decadron.  Reviewed her records.  Her exam here is most consistent with sciatica.  I initially tried to order an MRI but it was after hours and this was unavailable.  I do not feel the patient requires an emergent MRI given the fact that she has no clear evidence of cauda equina such as bowel or bladder incontinence groin sensation changes etc.  She is already established with pain management.  She will likely need an outpatient MRI which can be ordered through her PCP.  She is already in pain management and I cannot send her home with any additional opioids.    FINAL IMPRESSION  1.  Acute low back pain  Electronically signed by: Wilber Castelan M.D., 3/1/2021 5:58 PM

## 2021-03-02 NOTE — ED NOTES
Pt ambulated to the bathroom, pt was in a lot of pain.  Pt was wheeled back to the room due to her not being able to walk back because of all the pain.

## 2021-03-05 ENCOUNTER — APPOINTMENT (OUTPATIENT)
Dept: ADMISSIONS | Facility: MEDICAL CENTER | Age: 36
DRG: 621 | End: 2021-03-05
Payer: MEDICAID

## 2021-03-09 ENCOUNTER — TELEMEDICINE (OUTPATIENT)
Dept: ADMISSIONS | Facility: MEDICAL CENTER | Age: 36
DRG: 621 | End: 2021-03-09
Attending: SURGERY
Payer: MEDICAID

## 2021-03-09 RX ORDER — CLONAZEPAM 1 MG/1
0.5 TABLET ORAL PRN
COMMUNITY
Start: 2021-02-18 | End: 2021-07-06

## 2021-03-09 RX ORDER — PREGABALIN 25 MG/1
25 CAPSULE ORAL PRN
COMMUNITY
Start: 2021-01-13 | End: 2021-07-06

## 2021-03-09 RX ORDER — OXYCODONE HYDROCHLORIDE 10 MG/1
10 TABLET ORAL
Status: ON HOLD | COMMUNITY
End: 2021-03-16

## 2021-03-11 ENCOUNTER — HOSPITAL ENCOUNTER (OUTPATIENT)
Dept: RADIOLOGY | Facility: MEDICAL CENTER | Age: 36
DRG: 621 | End: 2021-03-11
Attending: SURGERY | Admitting: SURGERY
Payer: MEDICAID

## 2021-03-11 ENCOUNTER — APPOINTMENT (OUTPATIENT)
Dept: ADMISSIONS | Facility: MEDICAL CENTER | Age: 36
DRG: 621 | End: 2021-03-11
Attending: SURGERY
Payer: MEDICAID

## 2021-03-11 DIAGNOSIS — Z01.811 PRE-OPERATIVE RESPIRATORY EXAMINATION: ICD-10-CM

## 2021-03-11 DIAGNOSIS — Z01.810 PRE-OPERATIVE CARDIOVASCULAR EXAMINATION: ICD-10-CM

## 2021-03-11 DIAGNOSIS — Z01.812 PRE-OPERATIVE LABORATORY EXAMINATION: ICD-10-CM

## 2021-03-11 LAB
25(OH)D3 SERPL-MCNC: 33 NG/ML (ref 30–100)
CHOLEST SERPL-MCNC: 171 MG/DL (ref 100–199)
EKG IMPRESSION: NORMAL
FERRITIN SERPL-MCNC: 122 NG/ML (ref 10–291)
FOLATE SERPL-MCNC: 10.8 NG/ML
HDLC SERPL-MCNC: 51 MG/DL
IRON SATN MFR SERPL: 50 % (ref 15–55)
IRON SERPL-MCNC: 122 UG/DL (ref 40–170)
LDLC SERPL CALC-MCNC: 108 MG/DL
PREALB SERPL-MCNC: 20.8 MG/DL (ref 18–38)
PTH-INTACT SERPL-MCNC: 59 PG/ML (ref 14–72)
SARS-COV-2 RNA RESP QL NAA+PROBE: NOTDETECTED
SPECIMEN SOURCE: NORMAL
TIBC SERPL-MCNC: 242 UG/DL (ref 250–450)
TRIGL SERPL-MCNC: 58 MG/DL (ref 0–149)
UIBC SERPL-MCNC: 120 UG/DL (ref 110–370)
VIT B12 SERPL-MCNC: 590 PG/ML (ref 211–911)

## 2021-03-11 PROCEDURE — 82728 ASSAY OF FERRITIN: CPT

## 2021-03-11 PROCEDURE — 83540 ASSAY OF IRON: CPT

## 2021-03-11 PROCEDURE — 84134 ASSAY OF PREALBUMIN: CPT

## 2021-03-11 PROCEDURE — 83970 ASSAY OF PARATHORMONE: CPT

## 2021-03-11 PROCEDURE — 82746 ASSAY OF FOLIC ACID SERUM: CPT

## 2021-03-11 PROCEDURE — C9803 HOPD COVID-19 SPEC COLLECT: HCPCS

## 2021-03-11 PROCEDURE — 93010 ELECTROCARDIOGRAM REPORT: CPT | Performed by: INTERNAL MEDICINE

## 2021-03-11 PROCEDURE — U0005 INFEC AGEN DETEC AMPLI PROBE: HCPCS

## 2021-03-11 PROCEDURE — 83550 IRON BINDING TEST: CPT

## 2021-03-11 PROCEDURE — 36415 COLL VENOUS BLD VENIPUNCTURE: CPT

## 2021-03-11 PROCEDURE — 71046 X-RAY EXAM CHEST 2 VIEWS: CPT

## 2021-03-11 PROCEDURE — 82607 VITAMIN B-12: CPT

## 2021-03-11 PROCEDURE — 93005 ELECTROCARDIOGRAM TRACING: CPT | Performed by: SURGERY

## 2021-03-11 PROCEDURE — 82306 VITAMIN D 25 HYDROXY: CPT

## 2021-03-11 PROCEDURE — 80061 LIPID PANEL: CPT

## 2021-03-11 PROCEDURE — U0003 INFECTIOUS AGENT DETECTION BY NUCLEIC ACID (DNA OR RNA); SEVERE ACUTE RESPIRATORY SYNDROME CORONAVIRUS 2 (SARS-COV-2) (CORONAVIRUS DISEASE [COVID-19]), AMPLIFIED PROBE TECHNIQUE, MAKING USE OF HIGH THROUGHPUT TECHNOLOGIES AS DESCRIBED BY CMS-2020-01-R: HCPCS

## 2021-03-11 PROCEDURE — 84425 ASSAY OF VITAMIN B-1: CPT

## 2021-03-12 ENCOUNTER — APPOINTMENT (OUTPATIENT)
Dept: ADMISSIONS | Facility: MEDICAL CENTER | Age: 36
DRG: 621 | End: 2021-03-12
Attending: SURGERY
Payer: MEDICAID

## 2021-03-12 DIAGNOSIS — Z01.812 PRE-OPERATIVE LABORATORY EXAMINATION: ICD-10-CM

## 2021-03-12 ASSESSMENT — FIBROSIS 4 INDEX: FIB4 SCORE: 0.72

## 2021-03-15 ENCOUNTER — ANESTHESIA EVENT (OUTPATIENT)
Dept: SURGERY | Facility: MEDICAL CENTER | Age: 36
DRG: 621 | End: 2021-03-15
Payer: MEDICAID

## 2021-03-15 ENCOUNTER — HOSPITAL ENCOUNTER (INPATIENT)
Facility: MEDICAL CENTER | Age: 36
LOS: 1 days | DRG: 621 | End: 2021-03-16
Attending: SURGERY | Admitting: SURGERY
Payer: MEDICAID

## 2021-03-15 ENCOUNTER — ANESTHESIA (OUTPATIENT)
Dept: SURGERY | Facility: MEDICAL CENTER | Age: 36
DRG: 621 | End: 2021-03-15
Payer: MEDICAID

## 2021-03-15 DIAGNOSIS — G89.18 POSTOPERATIVE PAIN: ICD-10-CM

## 2021-03-15 DIAGNOSIS — Z01.810 PRE-OPERATIVE CARDIOVASCULAR EXAMINATION: ICD-10-CM

## 2021-03-15 DIAGNOSIS — Z01.812 PRE-OPERATIVE LABORATORY EXAMINATION: ICD-10-CM

## 2021-03-15 DIAGNOSIS — Z01.811 PRE-OPERATIVE RESPIRATORY EXAMINATION: ICD-10-CM

## 2021-03-15 LAB
PATHOLOGY CONSULT NOTE: NORMAL
VIT B1 BLD-MCNC: 120 NMOL/L (ref 70–180)

## 2021-03-15 PROCEDURE — 160029 HCHG SURGERY MINUTES - 1ST 30 MINS LEVEL 4: Performed by: SURGERY

## 2021-03-15 PROCEDURE — 160041 HCHG SURGERY MINUTES - EA ADDL 1 MIN LEVEL 4: Performed by: SURGERY

## 2021-03-15 PROCEDURE — 500521 HCHG ENDOSTITCH LOAD UNIT: Performed by: SURGERY

## 2021-03-15 PROCEDURE — 501570 HCHG TROCAR, SEPARATOR: Performed by: SURGERY

## 2021-03-15 PROCEDURE — 501583 HCHG TROCAR, THRD CAN&SEAL 5X100: Performed by: SURGERY

## 2021-03-15 PROCEDURE — 501838 HCHG SUTURE GENERAL: Performed by: SURGERY

## 2021-03-15 PROCEDURE — 700102 HCHG RX REV CODE 250 W/ 637 OVERRIDE(OP): Performed by: PHYSICIAN ASSISTANT

## 2021-03-15 PROCEDURE — 700105 HCHG RX REV CODE 258: Performed by: SURGERY

## 2021-03-15 PROCEDURE — A9270 NON-COVERED ITEM OR SERVICE: HCPCS | Performed by: SURGERY

## 2021-03-15 PROCEDURE — 0BQT4ZZ REPAIR DIAPHRAGM, PERCUTANEOUS ENDOSCOPIC APPROACH: ICD-10-PCS | Performed by: SURGERY

## 2021-03-15 PROCEDURE — 700111 HCHG RX REV CODE 636 W/ 250 OVERRIDE (IP): Performed by: ANESTHESIOLOGY

## 2021-03-15 PROCEDURE — 88305 TISSUE EXAM BY PATHOLOGIST: CPT

## 2021-03-15 PROCEDURE — 700111 HCHG RX REV CODE 636 W/ 250 OVERRIDE (IP): Performed by: PHYSICIAN ASSISTANT

## 2021-03-15 PROCEDURE — 700101 HCHG RX REV CODE 250: Performed by: ANESTHESIOLOGY

## 2021-03-15 PROCEDURE — 501664 HCHG TUBING, FILTER STRYKER: Performed by: SURGERY

## 2021-03-15 PROCEDURE — A9270 NON-COVERED ITEM OR SERVICE: HCPCS | Performed by: PHYSICIAN ASSISTANT

## 2021-03-15 PROCEDURE — 700105 HCHG RX REV CODE 258: Performed by: PHYSICIAN ASSISTANT

## 2021-03-15 PROCEDURE — 770006 HCHG ROOM/CARE - MED/SURG/GYN SEMI*

## 2021-03-15 PROCEDURE — 160048 HCHG OR STATISTICAL LEVEL 1-5: Performed by: SURGERY

## 2021-03-15 PROCEDURE — 500522 HCHG ENDOSTITCH SUTURING DEVICE: Performed by: SURGERY

## 2021-03-15 PROCEDURE — 160009 HCHG ANES TIME/MIN: Performed by: SURGERY

## 2021-03-15 PROCEDURE — 160035 HCHG PACU - 1ST 60 MINS PHASE I: Performed by: SURGERY

## 2021-03-15 PROCEDURE — 160036 HCHG PACU - EA ADDL 30 MINS PHASE I: Performed by: SURGERY

## 2021-03-15 PROCEDURE — 0DB64Z3 EXCISION OF STOMACH, PERCUTANEOUS ENDOSCOPIC APPROACH, VERTICAL: ICD-10-PCS | Performed by: SURGERY

## 2021-03-15 PROCEDURE — 700101 HCHG RX REV CODE 250: Performed by: SURGERY

## 2021-03-15 PROCEDURE — 160002 HCHG RECOVERY MINUTES (STAT): Performed by: SURGERY

## 2021-03-15 PROCEDURE — 501571 HCHG TROCAR, SEPARATOR 12X100: Performed by: SURGERY

## 2021-03-15 PROCEDURE — 502570 HCHG PACK, GASTRIC BANDING: Performed by: SURGERY

## 2021-03-15 PROCEDURE — 500868 HCHG NEEDLE, SURGI(VARES): Performed by: SURGERY

## 2021-03-15 PROCEDURE — 700111 HCHG RX REV CODE 636 W/ 250 OVERRIDE (IP): Performed by: SURGERY

## 2021-03-15 PROCEDURE — 700102 HCHG RX REV CODE 250 W/ 637 OVERRIDE(OP): Performed by: SURGERY

## 2021-03-15 RX ORDER — ONDANSETRON 4 MG/1
4 TABLET, ORALLY DISINTEGRATING ORAL EVERY 4 HOURS PRN
Status: DISCONTINUED | OUTPATIENT
Start: 2021-03-15 | End: 2021-03-16 | Stop reason: HOSPADM

## 2021-03-15 RX ORDER — ONDANSETRON HYDROCHLORIDE 8 MG/1
4 TABLET, FILM COATED ORAL 2 TIMES DAILY
COMMUNITY
End: 2021-07-06

## 2021-03-15 RX ORDER — ACETAMINOPHEN 500 MG
500 TABLET ORAL EVERY 6 HOURS PRN
COMMUNITY
End: 2021-07-06

## 2021-03-15 RX ORDER — ONDANSETRON 2 MG/ML
INJECTION INTRAMUSCULAR; INTRAVENOUS PRN
Status: DISCONTINUED | OUTPATIENT
Start: 2021-03-15 | End: 2021-03-15 | Stop reason: SURG

## 2021-03-15 RX ORDER — HYDRALAZINE HYDROCHLORIDE 20 MG/ML
10 INJECTION INTRAMUSCULAR; INTRAVENOUS EVERY 6 HOURS PRN
Status: DISCONTINUED | OUTPATIENT
Start: 2021-03-15 | End: 2021-03-16 | Stop reason: HOSPADM

## 2021-03-15 RX ORDER — MIDAZOLAM HYDROCHLORIDE 1 MG/ML
1 INJECTION INTRAMUSCULAR; INTRAVENOUS
Status: DISCONTINUED | OUTPATIENT
Start: 2021-03-15 | End: 2021-03-15 | Stop reason: HOSPADM

## 2021-03-15 RX ORDER — MORPHINE SULFATE 4 MG/ML
1 INJECTION, SOLUTION INTRAMUSCULAR; INTRAVENOUS
Status: DISCONTINUED | OUTPATIENT
Start: 2021-03-15 | End: 2021-03-15 | Stop reason: HOSPADM

## 2021-03-15 RX ORDER — SODIUM CHLORIDE, SODIUM LACTATE, POTASSIUM CHLORIDE, CALCIUM CHLORIDE 600; 310; 30; 20 MG/100ML; MG/100ML; MG/100ML; MG/100ML
INJECTION, SOLUTION INTRAVENOUS CONTINUOUS
Status: ACTIVE | OUTPATIENT
Start: 2021-03-15 | End: 2021-03-15

## 2021-03-15 RX ORDER — MAGNESIUM SULFATE HEPTAHYDRATE 500 MG/ML
INJECTION, SOLUTION INTRAMUSCULAR; INTRAVENOUS PRN
Status: DISCONTINUED | OUTPATIENT
Start: 2021-03-15 | End: 2021-03-15 | Stop reason: SURG

## 2021-03-15 RX ORDER — BUPIVACAINE HYDROCHLORIDE AND EPINEPHRINE 5; 5 MG/ML; UG/ML
INJECTION, SOLUTION EPIDURAL; INTRACAUDAL; PERINEURAL
Status: DISCONTINUED | OUTPATIENT
Start: 2021-03-15 | End: 2021-03-15 | Stop reason: HOSPADM

## 2021-03-15 RX ORDER — LORAZEPAM 2 MG/ML
.5-1 INJECTION INTRAMUSCULAR EVERY 4 HOURS PRN
Status: DISCONTINUED | OUTPATIENT
Start: 2021-03-15 | End: 2021-03-16 | Stop reason: HOSPADM

## 2021-03-15 RX ORDER — LABETALOL HYDROCHLORIDE 5 MG/ML
10 INJECTION, SOLUTION INTRAVENOUS EVERY 6 HOURS PRN
Status: DISCONTINUED | OUTPATIENT
Start: 2021-03-15 | End: 2021-03-16 | Stop reason: HOSPADM

## 2021-03-15 RX ORDER — OXYCODONE HCL 5 MG/5 ML
5-10 SOLUTION, ORAL ORAL EVERY 4 HOURS PRN
Status: DISCONTINUED | OUTPATIENT
Start: 2021-03-15 | End: 2021-03-16 | Stop reason: HOSPADM

## 2021-03-15 RX ORDER — ACETAMINOPHEN 10 MG/ML
1 INJECTION, SOLUTION INTRAVENOUS ONCE
Status: COMPLETED | OUTPATIENT
Start: 2021-03-15 | End: 2021-03-15

## 2021-03-15 RX ORDER — ROCURONIUM BROMIDE 10 MG/ML
INJECTION, SOLUTION INTRAVENOUS PRN
Status: DISCONTINUED | OUTPATIENT
Start: 2021-03-15 | End: 2021-03-15 | Stop reason: SURG

## 2021-03-15 RX ORDER — SODIUM CHLORIDE, SODIUM LACTATE, POTASSIUM CHLORIDE, CALCIUM CHLORIDE 600; 310; 30; 20 MG/100ML; MG/100ML; MG/100ML; MG/100ML
INJECTION, SOLUTION INTRAVENOUS CONTINUOUS
Status: DISCONTINUED | OUTPATIENT
Start: 2021-03-15 | End: 2021-03-16 | Stop reason: HOSPADM

## 2021-03-15 RX ORDER — ONDANSETRON 2 MG/ML
4 INJECTION INTRAMUSCULAR; INTRAVENOUS EVERY 4 HOURS PRN
Status: DISCONTINUED | OUTPATIENT
Start: 2021-03-15 | End: 2021-03-16 | Stop reason: HOSPADM

## 2021-03-15 RX ORDER — SCOLOPAMINE TRANSDERMAL SYSTEM 1 MG/1
1 PATCH, EXTENDED RELEASE TRANSDERMAL ONCE
Status: DISCONTINUED | OUTPATIENT
Start: 2021-03-15 | End: 2021-03-16 | Stop reason: HOSPADM

## 2021-03-15 RX ORDER — HALOPERIDOL 5 MG/ML
1 INJECTION INTRAMUSCULAR
Status: DISCONTINUED | OUTPATIENT
Start: 2021-03-15 | End: 2021-03-15 | Stop reason: HOSPADM

## 2021-03-15 RX ORDER — MIDAZOLAM HYDROCHLORIDE 1 MG/ML
INJECTION INTRAMUSCULAR; INTRAVENOUS
Status: COMPLETED
Start: 2021-03-15 | End: 2021-03-15

## 2021-03-15 RX ORDER — KETAMINE HYDROCHLORIDE 50 MG/ML
INJECTION, SOLUTION INTRAMUSCULAR; INTRAVENOUS PRN
Status: DISCONTINUED | OUTPATIENT
Start: 2021-03-15 | End: 2021-03-15 | Stop reason: SURG

## 2021-03-15 RX ORDER — KETOROLAC TROMETHAMINE 30 MG/ML
30 INJECTION, SOLUTION INTRAMUSCULAR; INTRAVENOUS EVERY 6 HOURS
Status: DISCONTINUED | OUTPATIENT
Start: 2021-03-15 | End: 2021-03-16 | Stop reason: HOSPADM

## 2021-03-15 RX ORDER — MORPHINE SULFATE 10 MG/ML
5 INJECTION, SOLUTION INTRAMUSCULAR; INTRAVENOUS
Status: DISCONTINUED | OUTPATIENT
Start: 2021-03-15 | End: 2021-03-15 | Stop reason: HOSPADM

## 2021-03-15 RX ORDER — HYDROMORPHONE HYDROCHLORIDE 1 MG/ML
.2-1 INJECTION, SOLUTION INTRAMUSCULAR; INTRAVENOUS; SUBCUTANEOUS
Status: DISCONTINUED | OUTPATIENT
Start: 2021-03-15 | End: 2021-03-16 | Stop reason: HOSPADM

## 2021-03-15 RX ORDER — ONDANSETRON 2 MG/ML
4 INJECTION INTRAMUSCULAR; INTRAVENOUS
Status: DISCONTINUED | OUTPATIENT
Start: 2021-03-15 | End: 2021-03-15 | Stop reason: HOSPADM

## 2021-03-15 RX ORDER — MORPHINE SULFATE 4 MG/ML
2 INJECTION, SOLUTION INTRAMUSCULAR; INTRAVENOUS
Status: DISCONTINUED | OUTPATIENT
Start: 2021-03-15 | End: 2021-03-15 | Stop reason: HOSPADM

## 2021-03-15 RX ORDER — PROMETHAZINE HYDROCHLORIDE 25 MG/1
25 SUPPOSITORY RECTAL EVERY 4 HOURS PRN
Status: DISCONTINUED | OUTPATIENT
Start: 2021-03-15 | End: 2021-03-16 | Stop reason: HOSPADM

## 2021-03-15 RX ORDER — ACETAMINOPHEN 10 MG/ML
1000 INJECTION, SOLUTION INTRAVENOUS EVERY 6 HOURS
Status: COMPLETED | OUTPATIENT
Start: 2021-03-15 | End: 2021-03-15

## 2021-03-15 RX ORDER — CEFAZOLIN SODIUM 1 G/3ML
INJECTION, POWDER, FOR SOLUTION INTRAMUSCULAR; INTRAVENOUS PRN
Status: DISCONTINUED | OUTPATIENT
Start: 2021-03-15 | End: 2021-03-15 | Stop reason: SURG

## 2021-03-15 RX ORDER — LIDOCAINE HYDROCHLORIDE 20 MG/ML
INJECTION, SOLUTION EPIDURAL; INFILTRATION; INTRACAUDAL; PERINEURAL PRN
Status: DISCONTINUED | OUTPATIENT
Start: 2021-03-15 | End: 2021-03-15 | Stop reason: SURG

## 2021-03-15 RX ORDER — DIPHENHYDRAMINE HYDROCHLORIDE 50 MG/ML
12.5 INJECTION INTRAMUSCULAR; INTRAVENOUS EVERY 6 HOURS PRN
Status: DISCONTINUED | OUTPATIENT
Start: 2021-03-15 | End: 2021-03-16 | Stop reason: HOSPADM

## 2021-03-15 RX ORDER — SIMETHICONE 80 MG
80 TABLET,CHEWABLE ORAL 3 TIMES DAILY PRN
Status: DISCONTINUED | OUTPATIENT
Start: 2021-03-15 | End: 2021-03-16 | Stop reason: HOSPADM

## 2021-03-15 RX ORDER — SODIUM CHLORIDE, SODIUM LACTATE, POTASSIUM CHLORIDE, AND CALCIUM CHLORIDE .6; .31; .03; .02 G/100ML; G/100ML; G/100ML; G/100ML
500 INJECTION, SOLUTION INTRAVENOUS
Status: DISCONTINUED | OUTPATIENT
Start: 2021-03-15 | End: 2021-03-16 | Stop reason: HOSPADM

## 2021-03-15 RX ORDER — DIPHENHYDRAMINE HYDROCHLORIDE 50 MG/ML
12.5 INJECTION INTRAMUSCULAR; INTRAVENOUS
Status: DISCONTINUED | OUTPATIENT
Start: 2021-03-15 | End: 2021-03-15 | Stop reason: HOSPADM

## 2021-03-15 RX ADMIN — LORAZEPAM 1 MG: 2 INJECTION INTRAMUSCULAR; INTRAVENOUS at 18:43

## 2021-03-15 RX ADMIN — HYDROMORPHONE HYDROCHLORIDE 1 MG: 1 INJECTION, SOLUTION INTRAMUSCULAR; INTRAVENOUS; SUBCUTANEOUS at 14:33

## 2021-03-15 RX ADMIN — FAMOTIDINE 20 MG: 10 INJECTION INTRAVENOUS at 13:00

## 2021-03-15 RX ADMIN — OXYCODONE HYDROCHLORIDE 10 MG: 5 SOLUTION ORAL at 18:14

## 2021-03-15 RX ADMIN — OXYCODONE HYDROCHLORIDE 10 MG: 5 SOLUTION ORAL at 22:23

## 2021-03-15 RX ADMIN — SCOPALAMINE 1 PATCH: 1 PATCH, EXTENDED RELEASE TRANSDERMAL at 07:55

## 2021-03-15 RX ADMIN — KETOROLAC TROMETHAMINE 30 MG: 30 INJECTION, SOLUTION INTRAMUSCULAR at 16:00

## 2021-03-15 RX ADMIN — MIDAZOLAM 2 MG: 1 INJECTION INTRAMUSCULAR; INTRAVENOUS at 09:16

## 2021-03-15 RX ADMIN — FENTANYL CITRATE 50 MCG: 50 INJECTION, SOLUTION INTRAMUSCULAR; INTRAVENOUS at 11:04

## 2021-03-15 RX ADMIN — HALOPERIDOL LACTATE 1 MG: 5 INJECTION, SOLUTION INTRAMUSCULAR at 10:30

## 2021-03-15 RX ADMIN — CEFAZOLIN 2 G: 330 INJECTION, POWDER, FOR SOLUTION INTRAMUSCULAR; INTRAVENOUS at 09:24

## 2021-03-15 RX ADMIN — OXYCODONE HYDROCHLORIDE 10 MG: 5 SOLUTION ORAL at 14:10

## 2021-03-15 RX ADMIN — FENTANYL CITRATE 50 MCG: 50 INJECTION, SOLUTION INTRAMUSCULAR; INTRAVENOUS at 11:26

## 2021-03-15 RX ADMIN — HYDROMORPHONE HYDROCHLORIDE 1 MG: 1 INJECTION, SOLUTION INTRAMUSCULAR; INTRAVENOUS; SUBCUTANEOUS at 15:59

## 2021-03-15 RX ADMIN — SIMETHICONE 80 MG: 80 TABLET, CHEWABLE ORAL at 23:39

## 2021-03-15 RX ADMIN — SIMETHICONE 80 MG: 80 TABLET, CHEWABLE ORAL at 16:04

## 2021-03-15 RX ADMIN — ONDANSETRON 4 MG: 2 INJECTION INTRAMUSCULAR; INTRAVENOUS at 14:10

## 2021-03-15 RX ADMIN — SODIUM CHLORIDE, POTASSIUM CHLORIDE, SODIUM LACTATE AND CALCIUM CHLORIDE: 600; 310; 30; 20 INJECTION, SOLUTION INTRAVENOUS at 07:55

## 2021-03-15 RX ADMIN — HYDROMORPHONE HYDROCHLORIDE 1 MG: 1 INJECTION, SOLUTION INTRAMUSCULAR; INTRAVENOUS; SUBCUTANEOUS at 23:35

## 2021-03-15 RX ADMIN — ROCURONIUM BROMIDE 50 MG: 10 INJECTION, SOLUTION INTRAVENOUS at 09:24

## 2021-03-15 RX ADMIN — HYDROMORPHONE HYDROCHLORIDE 1 MG: 1 INJECTION, SOLUTION INTRAMUSCULAR; INTRAVENOUS; SUBCUTANEOUS at 19:57

## 2021-03-15 RX ADMIN — ACETAMINOPHEN 1 G: 10 INJECTION, SOLUTION INTRAVENOUS at 07:56

## 2021-03-15 RX ADMIN — HYDROMORPHONE HYDROCHLORIDE 1 MG: 1 INJECTION, SOLUTION INTRAMUSCULAR; INTRAVENOUS; SUBCUTANEOUS at 18:14

## 2021-03-15 RX ADMIN — HYDROMORPHONE HYDROCHLORIDE 1 MG: 1 INJECTION, SOLUTION INTRAMUSCULAR; INTRAVENOUS; SUBCUTANEOUS at 13:23

## 2021-03-15 RX ADMIN — SODIUM CHLORIDE, POTASSIUM CHLORIDE, SODIUM LACTATE AND CALCIUM CHLORIDE: 600; 310; 30; 20 INJECTION, SOLUTION INTRAVENOUS at 12:49

## 2021-03-15 RX ADMIN — MAGNESIUM SULFATE HEPTAHYDRATE 4 G: 500 INJECTION, SOLUTION INTRAMUSCULAR; INTRAVENOUS at 09:34

## 2021-03-15 RX ADMIN — LIDOCAINE HYDROCHLORIDE 0.5 ML: 10 INJECTION, SOLUTION EPIDURAL; INFILTRATION; INTRACAUDAL at 07:55

## 2021-03-15 RX ADMIN — ACETAMINOPHEN 1000 MG: 10 INJECTION, SOLUTION INTRAVENOUS at 13:59

## 2021-03-15 RX ADMIN — ONDANSETRON 4 MG: 2 INJECTION INTRAMUSCULAR; INTRAVENOUS at 10:08

## 2021-03-15 RX ADMIN — METHOCARBAMOL 1000 MG: 100 INJECTION INTRAMUSCULAR; INTRAVENOUS at 12:11

## 2021-03-15 RX ADMIN — HYDROMORPHONE HYDROCHLORIDE 1 MG: 1 INJECTION, SOLUTION INTRAMUSCULAR; INTRAVENOUS; SUBCUTANEOUS at 10:30

## 2021-03-15 RX ADMIN — SIMETHICONE 80 MG: 80 TABLET, CHEWABLE ORAL at 19:04

## 2021-03-15 RX ADMIN — PROPOFOL 300 MG: 10 INJECTION, EMULSION INTRAVENOUS at 09:24

## 2021-03-15 RX ADMIN — ACETAMINOPHEN 1000 MG: 10 INJECTION, SOLUTION INTRAVENOUS at 18:14

## 2021-03-15 RX ADMIN — HYDROMORPHONE HYDROCHLORIDE 1 MG: 1 INJECTION, SOLUTION INTRAMUSCULAR; INTRAVENOUS; SUBCUTANEOUS at 11:57

## 2021-03-15 RX ADMIN — POVIDONE IODINE 15 ML: 100 SOLUTION TOPICAL at 07:56

## 2021-03-15 RX ADMIN — LIDOCAINE HYDROCHLORIDE 100 MG: 20 INJECTION, SOLUTION EPIDURAL; INFILTRATION; INTRACAUDAL at 09:24

## 2021-03-15 RX ADMIN — MIDAZOLAM HYDROCHLORIDE 1 MG: 1 INJECTION, SOLUTION INTRAMUSCULAR; INTRAVENOUS at 11:03

## 2021-03-15 RX ADMIN — KETAMINE HYDROCHLORIDE 100 MG: 50 INJECTION INTRAMUSCULAR; INTRAVENOUS at 09:50

## 2021-03-15 RX ADMIN — SODIUM CHLORIDE, POTASSIUM CHLORIDE, SODIUM LACTATE AND CALCIUM CHLORIDE: 600; 310; 30; 20 INJECTION, SOLUTION INTRAVENOUS at 19:59

## 2021-03-15 RX ADMIN — KETOROLAC TROMETHAMINE 30 MG: 30 INJECTION, SOLUTION INTRAMUSCULAR at 23:35

## 2021-03-15 RX ADMIN — MIDAZOLAM HYDROCHLORIDE 1 MG: 1 INJECTION, SOLUTION INTRAMUSCULAR; INTRAVENOUS at 11:26

## 2021-03-15 ASSESSMENT — FIBROSIS 4 INDEX: FIB4 SCORE: 0.72

## 2021-03-15 ASSESSMENT — PAIN DESCRIPTION - PAIN TYPE
TYPE: ACUTE PAIN;SURGICAL PAIN
TYPE: CHRONIC PAIN;SURGICAL PAIN

## 2021-03-15 NOTE — ANESTHESIA PROCEDURE NOTES
Airway    Date/Time: 3/15/2021 9:24 AM  Performed by: Bairon Yepez M.D.  Authorized by: Bairon Yepez M.D.     Location:  OR  Urgency:  Elective  Indications for Airway Management:  Anesthesia      Spontaneous Ventilation: absent    Sedation Level:  Deep  Preoxygenated: Yes    Patient Position:  Sniffing  Final Airway Type:  Endotracheal airway  Final Endotracheal Airway:  ETT  Cuffed: Yes    Technique Used for Successful ETT Placement:  Direct laryngoscopy    Insertion Site:  Oral  Blade Type:  Velazquez  Laryngoscope Blade/Videolaryngoscope Blade Size:  2  ETT Size (mm):  7.0  Measured from:  Teeth  ETT to Teeth (cm):  22  Placement Verified by: auscultation and capnometry    Cormack-Lehane Classification:  Grade I - full view of glottis  Number of Attempts at Approach:  1

## 2021-03-15 NOTE — OR NURSING
Patient is awake, alert, oriented, vital signs stable, in sinus jose a, on 10L oxymask per eras, nausea resolved after Haldol. Patient has chronic low back pain and states she lives at a baseline of 4-6/10. In recovery it is has been difficult to manage her pain, she states the back pain is far worse than the surgical pain, she can tolerate a pain of 6/10 and appears to be resting comfortably at that level. Dr. Ganser updated, robaxin was ordered in addition.     Lap sites are CDI    All belongings are with spouse.

## 2021-03-15 NOTE — ANESTHESIA PREPROCEDURE EVALUATION
Relevant Problems   No relevant active problems   morbid obesity    Physical Exam    Airway   Mallampati: II  TM distance: >3 FB  Neck ROM: full       Cardiovascular - normal exam  Rhythm: regular  Rate: normal  (-) murmur     Dental - normal exam           Pulmonary - normal exam  Breath sounds clear to auscultation     Abdominal    Neurological - normal exam                 Anesthesia Plan    ASA 3   ASA physical status 3 criteria: morbid obesity - BMI greater than or equal to 40    Plan - general       Airway plan will be ETT          Induction: intravenous      Pertinent diagnostic labs and testing reviewed    Informed Consent:    Anesthetic plan and risks discussed with patient.

## 2021-03-15 NOTE — PROGRESS NOTES
Med rec updated and complete  Allergies reviewed  Interviewed pt with boyfriend at bedside with permission from pt.  Pt reports no antibiotics in the last 2 weeks

## 2021-03-15 NOTE — ANESTHESIA POSTPROCEDURE EVALUATION
Patient: Yasemin Bradley    Procedure Summary     Date: 03/15/21 Room / Location: [facility] 09 / SURGERY [facility]    Anesthesia Start: 0917 Anesthesia Stop: 1015    Procedure: GASTRECTOMY, SLEEVE, LAPAROSCOPIC. (N/A Abdomen) Diagnosis: (MORBID OBESITY)    Surgeons: John H Ganser, M.D. Responsible Provider: Bairon Yepez M.D.    Anesthesia Type: general ASA Status: 3          Final Anesthesia Type: general  Last vitals  BP   Blood Pressure: 121/73    Temp   36.4 °C (97.6 °F)    Pulse   61   Resp   17    SpO2   100 %      Anesthesia Post Evaluation    Patient location during evaluation: PACU  Patient participation: complete - patient participated  Level of consciousness: awake and alert    Airway patency: patent  Anesthetic complications: no  Cardiovascular status: hemodynamically stable  Respiratory status: acceptable  Hydration status: euvolemic    PONV: none          There were no known complications for this encounter.     Nurse Pain Score: 8 (NPRS)

## 2021-03-15 NOTE — OP REPORT
Operative Report    Date: 3/15/2021    Surgeon: John Ganser M.D.    Assistant: oRbbie Barrow PA-C    Anesthesia: Dr. Yepez    Preoperative Diagnosis: Morbid Obesity, chronic back pain, fatigue    Postoperative Diagnosis: Same, Hiatal Hernia    Procedure Performed: Laparoscopic Vertical Sleeve Gastrectomy, Hiatal hernia repair    Indications: The patient is suffering from morbid obesity and it's sequelae with a body mass index of 40 kg/m2. They have failed dietary attempts at weight loss and have been fully counseled about risks and alternatives to sleeve gastrectomy and wish to proceed.    Findings: Sliding hiatal hernia    DESCRIPTION OF PROCEDURE: The patient was identified and general anesthetic   administered. Her abdomen was prepped and draped in the usual sterile   fashion. Local anesthesia of 0.5% Marcaine with epinephrine was injected   prior to making skin incision. Small incision was made to left midline in low  epigastric region and the Veress needle passed. The abdomen was insufflated   with carbon dioxide without incident and a 5-mm blunt trocar and 5-mm   30-degree scope inserted. Yoselin liver retractor was passed through a   small subxiphoid incision, used to elevate the lateral segment of liver and   this was held with the robotic arm. Right upper quadrant 12 mm, left upper   quadrant 15 mm, left lateral subcostal 5 mm trocars were placed. Inspection   of the hiatus revealed a sliding hiatal hernia. The fat pad was rotated off the gastroesophageal junction to help expose this area as well. The omentum was then   taken down off the greater curve of the stomach using the Harmonic scalpel.    The hiatal hernia was reduced anteriorly bringing the gastroesophageal junction well below the hiatus. There was no posterior component. A 40-Danish bougie wasthen passed by anesthesia and laid along the lesser curve of the stomach. The Dowling 60 power stapler was then placed starting about 4-5 cm proximal to the  pylorus and positioned adjacent to the bougie and fired. The sleeve was   completed with sequential firing of the stapler using green and gold loads. A  small cuff of stomach was left adjacent to the esophagus. The distal stomach was removed through the 15 mm trocar site.    Anterior hiatal repair was carried out with an 0-Surgidac horizontal mattress suture. The staple line was then oversewn with 3-0 absorbable V-Loc suture   incorporating the omentum along the way. The bougie was removed and the   sleeve maintained good orientation with no torsion or kinks. The abdomen was   irrigated and hemostasis assured. The trocars were removed. The abdomen   deflated, and incisions closed with Vicryl. Sterile dressings were applied.   The patient returned to recovery room in stable condition.    ____________________________________  JOHN H. GANSER, MD John Ganser, M.D., F.A.C.S.  [facility]  [facility] [place]  [phone]

## 2021-03-15 NOTE — ANESTHESIA TIME REPORT
Anesthesia Start and Stop Event Times     Date Time Event    3/15/2021 0901 Ready for Procedure     0917 Anesthesia Start     1015 Anesthesia Stop        Responsible Staff  03/15/21    Name Role Begin End    Bairon Yepez M.D. Anesth 0917 1015        Preop Diagnosis (Free Text):  Pre-op Diagnosis     MORBID OBESITY        Preop Diagnosis (Codes):    Post op Diagnosis  Morbid obesity (HCC)      Premium Reason  Non-Premium    Comments:

## 2021-03-16 VITALS
TEMPERATURE: 97.7 F | DIASTOLIC BLOOD PRESSURE: 50 MMHG | WEIGHT: 233 LBS | HEART RATE: 78 BPM | HEIGHT: 64 IN | BODY MASS INDEX: 39.78 KG/M2 | OXYGEN SATURATION: 92 % | RESPIRATION RATE: 18 BRPM | SYSTOLIC BLOOD PRESSURE: 107 MMHG

## 2021-03-16 LAB
ANION GAP SERPL CALC-SCNC: 8 MMOL/L (ref 7–16)
BUN SERPL-MCNC: 7 MG/DL (ref 8–22)
CALCIUM SERPL-MCNC: 8.5 MG/DL (ref 8.5–10.5)
CHLORIDE SERPL-SCNC: 102 MMOL/L (ref 96–112)
CO2 SERPL-SCNC: 24 MMOL/L (ref 20–33)
CREAT SERPL-MCNC: 0.48 MG/DL (ref 0.5–1.4)
ERYTHROCYTE [DISTWIDTH] IN BLOOD BY AUTOMATED COUNT: 43.5 FL (ref 35.9–50)
GLUCOSE SERPL-MCNC: 86 MG/DL (ref 65–99)
HCT VFR BLD AUTO: 38.8 % (ref 37–47)
HGB BLD-MCNC: 13.2 G/DL (ref 12–16)
MCH RBC QN AUTO: 31.3 PG (ref 27–33)
MCHC RBC AUTO-ENTMCNC: 34 G/DL (ref 33.6–35)
MCV RBC AUTO: 91.9 FL (ref 81.4–97.8)
PLATELET # BLD AUTO: 186 K/UL (ref 164–446)
PMV BLD AUTO: 9.7 FL (ref 9–12.9)
POTASSIUM SERPL-SCNC: 3.7 MMOL/L (ref 3.6–5.5)
RBC # BLD AUTO: 4.22 M/UL (ref 4.2–5.4)
SODIUM SERPL-SCNC: 134 MMOL/L (ref 135–145)
WBC # BLD AUTO: 14.2 K/UL (ref 4.8–10.8)

## 2021-03-16 PROCEDURE — 700102 HCHG RX REV CODE 250 W/ 637 OVERRIDE(OP): Performed by: PHYSICIAN ASSISTANT

## 2021-03-16 PROCEDURE — 85027 COMPLETE CBC AUTOMATED: CPT

## 2021-03-16 PROCEDURE — 700111 HCHG RX REV CODE 636 W/ 250 OVERRIDE (IP): Performed by: PHYSICIAN ASSISTANT

## 2021-03-16 PROCEDURE — 700105 HCHG RX REV CODE 258: Performed by: PHYSICIAN ASSISTANT

## 2021-03-16 PROCEDURE — 80048 BASIC METABOLIC PNL TOTAL CA: CPT

## 2021-03-16 PROCEDURE — A9270 NON-COVERED ITEM OR SERVICE: HCPCS | Performed by: PHYSICIAN ASSISTANT

## 2021-03-16 RX ORDER — OXYCODONE HCL 5 MG/5 ML
5-10 SOLUTION, ORAL ORAL EVERY 6 HOURS PRN
Qty: 200 ML | Refills: 0 | Status: SHIPPED | OUTPATIENT
Start: 2021-03-16 | End: 2021-03-21

## 2021-03-16 RX ADMIN — HYDROMORPHONE HYDROCHLORIDE 1 MG: 1 INJECTION, SOLUTION INTRAMUSCULAR; INTRAVENOUS; SUBCUTANEOUS at 08:22

## 2021-03-16 RX ADMIN — OXYCODONE HYDROCHLORIDE 10 MG: 5 SOLUTION ORAL at 05:19

## 2021-03-16 RX ADMIN — SIMETHICONE 80 MG: 80 TABLET, CHEWABLE ORAL at 08:30

## 2021-03-16 RX ADMIN — HYDROMORPHONE HYDROCHLORIDE 1 MG: 1 INJECTION, SOLUTION INTRAMUSCULAR; INTRAVENOUS; SUBCUTANEOUS at 02:01

## 2021-03-16 RX ADMIN — LORAZEPAM 1 MG: 2 INJECTION INTRAMUSCULAR; INTRAVENOUS at 08:30

## 2021-03-16 RX ADMIN — ONDANSETRON 4 MG: 2 INJECTION INTRAMUSCULAR; INTRAVENOUS at 08:22

## 2021-03-16 RX ADMIN — OXYCODONE HYDROCHLORIDE 10 MG: 5 SOLUTION ORAL at 08:22

## 2021-03-16 RX ADMIN — KETOROLAC TROMETHAMINE 30 MG: 30 INJECTION, SOLUTION INTRAMUSCULAR at 05:18

## 2021-03-16 RX ADMIN — LORAZEPAM 0.5 MG: 2 INJECTION INTRAMUSCULAR; INTRAVENOUS at 02:51

## 2021-03-16 RX ADMIN — FAMOTIDINE 20 MG: 10 INJECTION INTRAVENOUS at 05:19

## 2021-03-16 RX ADMIN — HYDROMORPHONE HYDROCHLORIDE 1 MG: 1 INJECTION, SOLUTION INTRAMUSCULAR; INTRAVENOUS; SUBCUTANEOUS at 09:30

## 2021-03-16 RX ADMIN — SODIUM CHLORIDE, POTASSIUM CHLORIDE, SODIUM LACTATE AND CALCIUM CHLORIDE: 600; 310; 30; 20 INJECTION, SOLUTION INTRAVENOUS at 02:45

## 2021-03-16 RX ADMIN — HYDROMORPHONE HYDROCHLORIDE 1 MG: 1 INJECTION, SOLUTION INTRAMUSCULAR; INTRAVENOUS; SUBCUTANEOUS at 10:32

## 2021-03-16 ASSESSMENT — COGNITIVE AND FUNCTIONAL STATUS - GENERAL
MOBILITY SCORE: 24
SUGGESTED CMS G CODE MODIFIER DAILY ACTIVITY: CH
SUGGESTED CMS G CODE MODIFIER MOBILITY: CH
DAILY ACTIVITIY SCORE: 24

## 2021-03-16 ASSESSMENT — LIFESTYLE VARIABLES
EVER FELT BAD OR GUILTY ABOUT YOUR DRINKING: NO
TOTAL SCORE: 0
CONSUMPTION TOTAL: NEGATIVE
HOW MANY TIMES IN THE PAST YEAR HAVE YOU HAD 5 OR MORE DRINKS IN A DAY: 0
EVER HAD A DRINK FIRST THING IN THE MORNING TO STEADY YOUR NERVES TO GET RID OF A HANGOVER: NO
HAVE YOU EVER FELT YOU SHOULD CUT DOWN ON YOUR DRINKING: NO
ON A TYPICAL DAY WHEN YOU DRINK ALCOHOL HOW MANY DRINKS DO YOU HAVE: 0
TOTAL SCORE: 0
TOTAL SCORE: 0
HAVE PEOPLE ANNOYED YOU BY CRITICIZING YOUR DRINKING: NO
ALCOHOL_USE: NO
DOES PATIENT WANT TO STOP DRINKING: NO
AVERAGE NUMBER OF DAYS PER WEEK YOU HAVE A DRINK CONTAINING ALCOHOL: 0

## 2021-03-16 ASSESSMENT — ENCOUNTER SYMPTOMS
FEVER: 0
CHILLS: 0
SHORTNESS OF BREATH: 0
VOMITING: 0
ABDOMINAL PAIN: 1

## 2021-03-16 ASSESSMENT — PAIN DESCRIPTION - PAIN TYPE
TYPE: ACUTE PAIN;CHRONIC PAIN;SURGICAL PAIN
TYPE: ACUTE PAIN;SURGICAL PAIN
TYPE: CHRONIC PAIN;SURGICAL PAIN

## 2021-03-16 ASSESSMENT — PATIENT HEALTH QUESTIONNAIRE - PHQ9
SUM OF ALL RESPONSES TO PHQ9 QUESTIONS 1 AND 2: 0
1. LITTLE INTEREST OR PLEASURE IN DOING THINGS: NOT AT ALL
2. FEELING DOWN, DEPRESSED, IRRITABLE, OR HOPELESS: NOT AT ALL

## 2021-03-16 ASSESSMENT — FIBROSIS 4 INDEX: FIB4 SCORE: 0.86

## 2021-03-16 NOTE — PROGRESS NOTES
Bedside report received from day shift RN.   Assessment complete.  A&O x 4. Patient calls appropriately.  Patient ambulates with self assist.    Patient has 7-8/10 pain. Pain managed with prescribed medications.  Denies N&V. Tolerating bariatric diet.  Surgical patient with x4 lap sites.  + void, + flatus, + BM.  Patient denies SOB.    Patient educated on requesting Q1 hr medication WITH other narcotics and benzodiazepines.    Reviewed plan of care with patient. Call light and personal belongings with in reach. Hourly rounding in place. All needs met at this time.

## 2021-03-16 NOTE — PROGRESS NOTES
Bedside report received. Assessment completed.  Pt is A&O x4. Pt on room air.   Medicating for pain PRN per MAR.    Pt still requiring hourly IV pain medication and IV anxiety medication when available. This RN educated pt on importance of weaning off of pain mx to prepare for discharge. Patient verbalized understanding but reported that she feels she needs consistent PRN iv mx. Will continue to educate patient and reassess pain hourly.     Medicating for nausea PRN per MAR.    - numbness, - tingling.  5 lap sites to abd with gauze and tegaderm in place.    LDA saline locked.    Last BM pta . Patient reports flatus.  +void.  Bariatric clears diet. Tolerates well.   Pt up self. Tolerates ambulation well.  Call light and belongings within reach. All needs met at this time. Fall Precautions and hourly rounding in place.

## 2021-03-16 NOTE — PROGRESS NOTES
Surgical Progress Note    Author: MITCHEL Blackwell Date & Time created: 3/16/2021   8:25 AM     Interval Events:  S/p Laparoscopic Vertical Sleeve Gastrectomy, hiatal hernia -POD#1, doing well; [place] bariatric clears  well without dysphagia or N/V; ambulatory; using IS; pain controlled; wants to go home    Review of Systems   Constitutional: Negative for chills and fever.   Respiratory: Negative for shortness of breath.    Gastrointestinal: Positive for abdominal pain. Negative for vomiting.   Skin: Negative for itching and rash.     Hemodynamics:  Temp (24hrs), Av.3 °C (97.4 °F), Min:35.9 °C (96.6 °F), Max:36.6 °C (97.9 °F)  Temperature: 36.5 °C (97.7 °F)  Pulse  Av  Min: 49  Max: 94   Blood Pressure: 107/50     Respiratory:    Respiration: 18, Pulse Oximetry: 92 %           Neuro:  GCS       Fluids:    Intake/Output Summary (Last 24 hours) at 3/16/2021 0825  Last data filed at 3/16/2021 0757  Gross per 24 hour   Intake 2865 ml   Output 0 ml   Net 2865 ml     Weight: 106 kg (233 lb)  Current Diet Order   Procedures   • Diet Order Diet: Clear Liquid; Miscellaneous modifications: (optional): Bariatric     Physical Exam  Vitals and nursing note reviewed.   Constitutional:       General: She is not in acute distress.     Appearance: She is obese.   Cardiovascular:      Rate and Rhythm: Normal rate.   Pulmonary:      Effort: Pulmonary effort is normal. No respiratory distress.   Abdominal:      Palpations: Abdomen is soft.      Comments:   teg's c/d/i   Skin:     General: Skin is warm and dry.   Neurological:      Mental Status: She is alert and oriented to person, place, and time.   Psychiatric:         Mood and Affect: Mood normal.       Labs:  Recent Results (from the past 24 hour(s))   Histology Request    Collection Time: 03/15/21 10:34 AM   Result Value Ref Range    Pathology Request Sent to Histo    CBC without Differential (blood)    Collection Time: 21  1:26 AM   Result Value Ref Range    WBC  14.2 (H) 4.8 - 10.8 K/uL    RBC 4.22 4.20 - 5.40 M/uL    Hemoglobin 13.2 12.0 - 16.0 g/dL    Hematocrit 38.8 37.0 - 47.0 %    MCV 91.9 81.4 - 97.8 fL    MCH 31.3 27.0 - 33.0 pg    MCHC 34.0 33.6 - 35.0 g/dL    RDW 43.5 35.9 - 50.0 fL    Platelet Count 186 164 - 446 K/uL    MPV 9.7 9.0 - 12.9 fL   Basic Metabolic Panel (BMP)    Collection Time: 03/16/21  1:26 AM   Result Value Ref Range    Sodium 134 (L) 135 - 145 mmol/L    Potassium 3.7 3.6 - 5.5 mmol/L    Chloride 102 96 - 112 mmol/L    Co2 24 20 - 33 mmol/L    Glucose 86 65 - 99 mg/dL    Bun 7 (L) 8 - 22 mg/dL    Creatinine 0.48 (L) 0.50 - 1.40 mg/dL    Calcium 8.5 8.5 - 10.5 mg/dL    Anion Gap 8.0 7.0 - 16.0   ESTIMATED GFR    Collection Time: 03/16/21  1:26 AM   Result Value Ref Range    GFR If African American >60 >60 mL/min/1.73 m 2    GFR If Non African American >60 >60 mL/min/1.73 m 2     Medical Decision Making, by Problem:  There are no active hospital problems to display for this patient.    Plan:  Bariatric clears today, advance diet as tolerated to Bariatric full liquids on morning of POD#2. Strive for 5-10 ouces of fluid per hour while awake. Follow guidelines in preop booklet for food choices.  Incentive spirometry Q hr while awake, continue at home.  Up ad kelly.  Ok to Shower over Tegaderms; remove Tegaderms on 03/19/21 .  No baths or soaks x 14 days.  No driving x 4-5 days.  No lifting > 15 lbs until after post-op appt.  D/C home when alert, comfortable, ambulatory, and tolerating PO well.  Pt Counseled re: I.S., diet, activity, home med's, continued use of incentive spirometer at home,  and wound care.  Begin PPI at home (all home med's larger in size than eraser head should be crushed or changed to liquid form x 1-2 weeks, while on liquid diet).  Hold MVI/supplements until after postop appointment.  F/U with Dr. Ganser ~ 1-2 weeks.    Quality Measures:  Quality-Core Measures   Reviewed items::  Labs reviewed and Medications reviewed  Jorge  catheter::  No Patel  DVT prophylaxis pharmacological::  Enoxaparin (Lovenox)  DVT prophylaxis - mechanical:  SCDs  Ulcer Prophylaxis::  Yes      Discussed patient condition with RN, Patient and Dr. Ganser

## 2021-03-16 NOTE — CARE PLAN
Problem: Discharge Barriers/Planning  Goal: Patient's continuum of care needs will be met  Outcome: PROGRESSING AS EXPECTED     Problem: Fluid Volume:  Goal: Will maintain balanced intake and output  Outcome: PROGRESSING AS EXPECTED     Problem: Pain Management  Goal: Pain level will decrease to patient's comfort goal  Outcome: PROGRESSING SLOWER THAN EXPECTED

## 2021-03-16 NOTE — PROGRESS NOTES
Discharge orders received.  Patient arrived to the discharge lounge.  PIV discontinued.  Instructions, prescriptions and education given to patient.  Follow up appointments discussed.  Patient verbalized understanding of dc instructions and prescriptions.  Patient signed discharge instructions.  Patient verbalized she had all belongings with her.  Patient left via personal vehicle to home with her .  Wished patient a pleasant day.

## 2021-03-16 NOTE — CARE PLAN
Problem: Communication  Goal: The ability to communicate needs accurately and effectively will improve  Outcome: PROGRESSING AS EXPECTED     Problem: Safety  Goal: Will remain free from injury  Outcome: PROGRESSING AS EXPECTED  Goal: Will remain free from falls  Outcome: PROGRESSING AS EXPECTED     Problem: Infection  Goal: Will remain free from infection  Outcome: PROGRESSING AS EXPECTED     Problem: Venous Thromboembolism (VTW)/Deep Vein Thrombosis (DVT) Prevention:  Goal: Patient will participate in Venous Thrombosis (VTE)/Deep Vein Thrombosis (DVT)Prevention Measures  Outcome: PROGRESSING AS EXPECTED     Problem: Bowel/Gastric:  Goal: Normal bowel function is maintained or improved  Outcome: PROGRESSING AS EXPECTED  Goal: Will not experience complications related to bowel motility  Outcome: PROGRESSING AS EXPECTED     Problem: Knowledge Deficit  Goal: Knowledge of disease process/condition, treatment plan, diagnostic tests, and medications will improve  Outcome: PROGRESSING AS EXPECTED  Goal: Knowledge of the prescribed therapeutic regimen will improve  Outcome: PROGRESSING AS EXPECTED     Problem: Discharge Barriers/Planning  Goal: Patient's continuum of care needs will be met  Outcome: PROGRESSING AS EXPECTED     Problem: Pain Management  Goal: Pain level will decrease to patient's comfort goal  Outcome: PROGRESSING AS EXPECTED     Problem: Fluid Volume:  Goal: Will maintain balanced intake and output  Outcome: PROGRESSING AS EXPECTED     Problem: Psychosocial Needs:  Goal: Level of anxiety will decrease  Outcome: PROGRESSING AS EXPECTED

## 2021-03-26 NOTE — DISCHARGE INSTRUCTIONS
Discharge Instructions    Discharged to home by car with relative. Discharged via wheelchair, hospital escort: Yes.  Special equipment needed: Not Applicable    Be sure to schedule a follow-up appointment with your primary care doctor or any specialists as instructed.     Discharge Plan:     Bariatric clears today, advance diet as tolerated to Bariatric full liquids on morning of Post Op Day#2. Strive for 5-10 ouces of fluid per hour while awake. Follow guidelines in preop booklet for food choices.  Incentive spirometry every hour while awake, continue at home.  Up ad kelly.  Ok to Shower over Tegaderms; remove Tegaderms on 03/19/21 .  No baths or soaks x 14 days.  No driving x 4-5 days.  No lifting > 15 lbs until after post-op appt.  Discharge home when alert, comfortable, ambulatory, and tolerating clears well.  Pt Counseled re: I.S., diet, activity, home med's, continued use of incentive spirometer at home,  and wound care.  Begin PPI at home (all home med's larger in size than eraser head should be crushed or changed to liquid form x 1-2 weeks, while on liquid diet).  Hold MVI/supplements until after postop appointment.  Follow up with Dr. Ganser ~ 1-2 weeks.    Diet Plan: Discussed  Activity Level: Discussed  Confirmed Follow up Appointment: Patient to Call and Schedule Appointment  Confirmed Symptoms Management: Discussed  Medication Reconciliation Updated: Yes  Influenza Vaccine Indication: Not indicated: Previously immunized this influenza season and > 8 years of age    I understand that a diet low in cholesterol, fat, and sodium is recommended for good health. Unless I have been given specific instructions below for another diet, I accept this instruction as my diet prescription.   Other diet: Follow the diet as outlined in the pre-op booklet provided to you by Dr. Ganser's office prior to surgery.    Special Instructions:   Sleeve Gastrectomy, Care After  This sheet gives you information about how to care  Problem: Falls - Risk of:  Goal: Will remain free from falls  Description: Will remain free from falls  Outcome: Ongoing  Goal: Absence of physical injury  Description: Absence of physical injury  Outcome: Ongoing     Problem: IP BLADDER/VOIDING  Goal: LTG - patient will complete bladder elimination  Outcome: Ongoing  Goal: LTG - Patient will utilize adaptive techniques/equipment to complete bladder elimination  Outcome: Ongoing  Goal: LTG - patient will achieve acceptable level of continence  Outcome: Ongoing  Goal: STG - Patient demonstrates ability to take care of indwelling catheter  Outcome: Ongoing  Goal: STG - patient demonstrates self-cath technique using clean technique and care of the catheter  Outcome: Ongoing  Goal: STG - Patient demonstrates no accidents  Outcome: Ongoing  Goal: STG - Patient will state signs and symptoms of UTI  Outcome: Ongoing  Goal: STG - patient will be able to empty bladder  Outcome: Ongoing  Goal: STG - Patient demonstrates understanding of self catheterization schedule by completing task on time  Outcome: Ongoing  Goal: STG - patient participates in bladder program by expressing need to void  Outcome: Ongoing  Goal: STG - Patient verbalizes understanding of catheter care indwelling/intermittent  Outcome: Ongoing  Goal: STG - patient participates in bladder program by adhering to implemented toileting schedule  Outcome: Ongoing     Problem: IP BOWEL ELIMINATION  Goal: LTG - patient will utilize adaptive techniques/equipment to complete bowel elimination  Outcome: Ongoing  Goal: LTG - patient will have regular and routine bowel evacuation  Outcome: Ongoing  Goal: STG - patient will be accident free  Outcome: Ongoing  Goal: STG - Patient demonstrates care and management of ostomy bag  Outcome: Ongoing  Goal: STG - Patient participates in bowel management program  Outcome: Ongoing  Goal: STG - patient maintains skin integrity  Outcome: Ongoing  Goal: STG - Patient will verbalize for yourself after your procedure. Your health care provider may also give you more specific instructions. If you have problems or questions, contact your health care provider.  What can I expect after the procedure?  After the procedure, it is common to have:  · Pain in the abdomen.  · Decreased appetite.  · Clear fluid leaking through the small tube (drain) that comes from your incision site.  Follow these instructions at home:  Medicines  · Take over-the-counter and prescription medicines only as told by your health care provider.  · Do not drive for 24 hours if you were given a sedative during your procedure.  · Do not drive or use heavy machinery while taking prescription pain medicine.  Incision and drain care    · Follow instructions from your health care provider about how to take care of your incisions. Make sure you:  ? Wash your hands with soap and water before and after you change your bandage (dressing). If soap and water are not available, use hand .  ? Change your dressing as told by your health care provider.  ? Leave stitches (sutures), skin glue, or adhesive strips in place. These skin closures may need to be in place for 2 weeks or longer. If adhesive strip edges start to loosen and curl up, you may trim the loose edges. Do not remove adhesive strips completely unless your health care provider tells you to do that.  · Keep the area around your incisions and your drain clean and dry.  · Check your incision areas every day for signs of infection. Check for:  ? Redness, swelling, or pain.  ? Fluid or blood.  ? Warmth.  ? Pus or a bad smell.  · Empty your drain every day. Follow instructions from your health care provider about recording the amount of fluid that comes from your drain. Make note of any changes in the amount or appearance of the fluid.  Activity    · Rest as told by your health care provider.  · Avoid sitting for a long time without moving. Get up to take short walks every 1-2  signs and symptoms of constipation and how to prevent/alleviate  Outcome: Ongoing  Goal: STG - patient will be continent of stool  Outcome: Ongoing  Goal: STG - Patient completes digital stimulation technique  Outcome: Ongoing  Goal: STG - Patient verbalizes knowledge about relationship between diet, fluid intake, activity and medication on constipation  Outcome: Ongoing     Problem: IP BREATHING  Goal: LTG - patient will mobilize secretions and maintain airway  Outcome: Ongoing  Goal: LTG - Patient/caregiver will demonstrate/perform proper techniques to maintain patent airway  Outcome: Ongoing  Goal: LTG - patient/caregiver will demonstrate/perform improved or stable self care abilities within physical limitations  Outcome: Ongoing  Goal: STG - Patient/caregiver will maintain patent airway  Outcome: Ongoing  Goal: STG - respiratory rate and effort will be within normal limits for the patient  Outcome: Ongoing  Goal: STG - patient/caregiver will be able to verbalize oxygen safety precautions  Outcome: Ongoing  Goal: STG - Patient/caregiver demonstrates correct suctioning technique  Outcome: Ongoing  Goal: STG - patient will utilize incentive spirometer  Outcome: Ongoing  Goal: STG - Patient performs or directs assisted coughing  Outcome: Ongoing  Goal: STG - patient can administer MDI's  Outcome: Ongoing  Goal: STG - Patient can utilize incentive spirometer  Outcome: Ongoing  Goal: STG - family can complete suctioning  Outcome: Ongoing     Problem: NUTRITION  Description: Altered Nutrition and Hydration  Goal: Patient maintains adequate hydration  Outcome: Ongoing  Goal: Patient maintains weight  Outcome: Ongoing  Goal: Patient/Family demonstrates understanding of diet  Outcome: Ongoing  Goal: Patient/Family independently completes tube feeding  Outcome: Ongoing  Goal: Patient will have no more than 5 lb weight change during LOS  Outcome: Ongoing  Goal: Patient will utilize adaptive techniques to administer hours. This is important to improve blood flow and breathing. Ask for help if you feel weak or unsteady.  · Return to your normal activities as told by your health care provider. Ask your health care provider what activities are safe for you.  · Do not lift anything that is heavier than 10 lb (4.5 kg), or the limit that you are told, until your health care provider says that it is safe.  · Avoid intense physical activity for as long as told by your health care provider.  Eating and drinking  · Follow instructions from your health care provider about eating or drinking restrictions. You will be given instructions about the type, the size, and the timing of your meals.  ? Keep track of any foods that cause discomfort, such as bloating or cramping.  ? Eat healthy foods. Avoid foods that are high in fat or sugar.  · Stop eating when you feel full.  · Take supplements only as told by your health care provider.  · Drink enough fluid to keep your urine pale yellow.  General instructions  · Do not take baths, swim, or use a hot tub until your health care provider approves. Ask your health care provider if you may take showers. You may only be allowed to take sponge baths.  · Do not use any products that contain nicotine or tobacco, such as cigarettes, e-cigarettes, and chewing tobacco. If you need help quitting, ask your health care provider.  · Wear compression stockings as told by your health care provider. These stockings help to prevent blood clots and reduce swelling in your legs.  · Do breathing exercises as told by your health care provider.  · Keep all follow-up visits as told by your health care provider. This is important.  Contact a health care provider if you have:  · Pain that gets worse or does not get better with medicine.  · Redness, swelling, or pain around your incisions.  · Fluid or blood coming from your incisions.  · Incisions that feel warm to the touch.  · Pus or a bad smell coming from your  nutrition  Outcome: Ongoing  Goal: Patient will verbalize dietary restrictions  Outcome: Ongoing incisions.  · A fever or chills.  · Problems with your drain.  · Green or bad-smelling fluid leaking from your drain.  Get help right away if you have:  · Trouble breathing.  · Severe pain, especially in your legs.  Summary  · After the procedure, it is common to have pain in the abdomen, decreased appetite, and clear fluid leaking from the drain in an incision site.  · Take over-the-counter and prescription medicines only as told by your health care provider.  · Follow instructions from your health care provider about how to take care of your incisions. Report any signs of infection to your health care provider.  · After surgery, get up to take short walks every 1-2 hours. This is important to improve blood flow and breathing. Follow instructions from your health care provider about eating or drinking restrictions.  · Get help right away if you have trouble breathing or severe pain, especially in your legs.  This information is not intended to replace advice given to you by your health care provider. Make sure you discuss any questions you have with your health care provider.      Hiatal Hernia    A hiatal hernia occurs when part of the stomach slides above the muscle that separates the abdomen from the chest (diaphragm). A person can be born with a hiatal hernia (congenital), or it may develop over time. In almost all cases of hiatal hernia, only the top part of the stomach pushes through the diaphragm.  Many people have a hiatal hernia with no symptoms. The larger the hernia, the more likely it is that you will have symptoms. In some cases, a hiatal hernia allows stomach acid to flow back into the tube that carries food from your mouth to your stomach (esophagus). This may cause heartburn symptoms. Severe heartburn symptoms may mean that you have developed a condition called gastroesophageal reflux disease (GERD).  What are the causes?  This condition is caused by a weakness in the opening (hiatus) where the  esophagus passes through the diaphragm to attach to the upper part of the stomach. A person may be born with a weakness in the hiatus, or a weakness can develop over time.  What increases the risk?  This condition is more likely to develop in:  · Older people. Age is a major risk factor for a hiatal hernia, especially if you are over the age of 50.  · Pregnant women.  · People who are overweight.  · People who have frequent constipation.  What are the signs or symptoms?  Symptoms of this condition usually develop in the form of GERD symptoms. Symptoms include:  · Heartburn.  · Belching.  · Indigestion.  · Trouble swallowing.  · Coughing or wheezing.  · Sore throat.  · Hoarseness.  · Chest pain.  · Nausea and vomiting.  How is this diagnosed?  This condition may be diagnosed during testing for GERD. Tests that may be done include:  · X-rays of your stomach or chest.  · An upper gastrointestinal (GI) series. This is an X-ray exam of your GI tract that is taken after you swallow a chalky liquid that shows up clearly on the X-ray.  · Endoscopy. This is a procedure to look into your stomach using a thin, flexible tube that has a tiny camera and light on the end of it.  How is this treated?  This condition may be treated by:  · Dietary and lifestyle changes to help reduce GERD symptoms.  · Medicines. These may include:  ? Over-the-counter antacids.  ? Medicines that make your stomach empty more quickly.  ? Medicines that block the production of stomach acid (H2 blockers).  ? Stronger medicines to reduce stomach acid (proton pump inhibitors).  · Surgery to repair the hernia, if other treatments are not helping.  If you have no symptoms, you may not need treatment.  Follow these instructions at home:  Lifestyle and activity  · Do not use any products that contain nicotine or tobacco, such as cigarettes and e-cigarettes. If you need help quitting, ask your health care provider.  · Try to achieve and maintain a healthy body  weight.  · Avoid putting pressure on your abdomen. Anything that puts pressure on your abdomen increases the amount of acid that may be pushed up into your esophagus.  ? Avoid bending over, especially after eating.  ? Raise the head of your bed by putting blocks under the legs. This keeps your head and esophagus higher than your stomach.  ? Do not wear tight clothing around your chest or stomach.  ? Try not to strain when having a bowel movement, when urinating, or when lifting heavy objects.  Eating and drinking  · Avoid foods that can worsen GERD symptoms. These may include:  ? Fatty foods, like fried foods.  ? Citrus fruits, like oranges or lemon.  ? Other foods and drinks that contain acid, like orange juice or tomatoes.  ? Spicy food.  ? Chocolate.  · Eat frequent small meals instead of three large meals a day. This helps prevent your stomach from getting too full.  ? Eat slowly.  ? Do not lie down right after eating.  ? Do not eat 1-2 hours before bed.  · Do not drink beverages with caffeine. These include cola, coffee, cocoa, and tea.  · Do not drink alcohol.  General instructions  · Take over-the-counter and prescription medicines only as told by your health care provider.  · Keep all follow-up visits as told by your health care provider. This is important.  Contact a health care provider if:  · Your symptoms are not controlled with medicines or lifestyle changes.  · You are having trouble swallowing.  · You have coughing or wheezing that will not go away.  Get help right away if:  · Your pain is getting worse.  · Your pain spreads to your arms, neck, jaw, teeth, or back.  · You have shortness of breath.  · You sweat for no reason.  · You feel sick to your stomach (nauseous) or you vomit.  · You vomit blood.  · You have bright red blood in your stools.  · You have black, tarry stools.  This information is not intended to replace advice given to you by your health care provider. Make sure you discuss any  questions you have with your health care provider.    Hernia Repair  Care After  These instructions give you information on caring for yourself after your procedure. Your doctor may also give you more specific instructions. Call your doctor if you have any problems or questions after your procedure.  HOME CARE   · You may have changes in your poops (bowel movements).  · You may have loose or watery poop (diarrhea).  · You may be not able to poop.  · Your bowels will slowly get back to normal.  · Do not eat any food that makes you sick to your stomach (nauseous). Eat small meals 4 to 6 times a day instead of 3 large ones.  · Do not drink pop. It will give you gas.  · Do not drink alcohol.  · Do not lift anything heavier than 10 pounds. This is about the weight of a gallon of milk.  · Do not do anything that makes you very tired for at least 6 weeks.  · Do not get your wound wet for 2 days.  · You may take a sponge bath during this time.  · After 2 days you may take a shower. Gently pat your surgical cut (incision) dry with a towel. Do not rub it.  · For men: You may have been given an athletic supporter (scrotal support) before you left the hospital. It holds your scrotum and testicles closer to your body so there is no strain on your wound. Wear the supporter until your doctor tells you that you do not need it anymore.  GET HELP RIGHT AWAY IF:  · You have watery poop, or cannot poop for more than 3 days.  · You feel sick to your stomach or throw up (vomit) more than 2 or 3 times.  · You have temperature by mouth above 102° F (38.9° C).  · You see redness or puffiness (swelling) around your wound.  · You see yellowish white fluid (pus) coming from your wound.  · You see a bulge or bump in your lower belly (abdomen) or near your groin.  · You develop a rash, trouble breathing, or any other symptoms from medicines taken.  MAKE SURE YOU:  · Understand these instructions.  · Will watch your condition.  · Will get help  right away if your are not doing well or get worse.    · Is patient discharged on Warfarin / Coumadin?   No     Depression / Suicide Risk    As you are discharged from this [facility] facility, it is important to learn how to keep safe from harming yourself.    Recognize the warning signs:  · Abrupt changes in personality, positive or negative- including increase in energy   · Giving away possessions  · Change in eating patterns- significant weight changes-  positive or negative  · Change in sleeping patterns- unable to sleep or sleeping all the time   · Unwillingness or inability to communicate  · Depression  · Unusual sadness, discouragement and loneliness  · Talk of wanting to die  · Neglect of personal appearance   · Rebelliousness- reckless behavior  · Withdrawal from people/activities they love  · Confusion- inability to concentrate     If you or a loved one observes any of these behaviors or has concerns about self-harm, here's what you can do:  · Talk about it- your feelings and reasons for harming yourself  · Remove any means that you might use to hurt yourself (examples: pills, rope, extension cords, firearm)  · Get professional help from the community (Mental Health, Substance Abuse, psychological counseling)  · Do not be alone:Call your Safe Contact- someone whom you trust who will be there for you.  · Call your local CRISIS HOTLINE 032-8938 or 205-828-3046  · Call your local Children's Mobile Crisis Response Team [place] (857) 774-5334 or www.[employer]  · Call the toll free National Suicide Prevention Hotlines   · National Suicide Prevention Lifeline 292-715-LIGJ (2581)  · National Hope Line Network 800-SUICIDE (227-3944)

## 2021-07-06 ENCOUNTER — APPOINTMENT (OUTPATIENT)
Dept: RADIOLOGY | Facility: MEDICAL CENTER | Age: 36
End: 2021-07-06
Attending: FAMILY MEDICINE
Payer: MEDICAID

## 2021-07-06 ENCOUNTER — APPOINTMENT (OUTPATIENT)
Dept: RADIOLOGY | Facility: MEDICAL CENTER | Age: 36
End: 2021-07-06
Attending: EMERGENCY MEDICINE
Payer: MEDICAID

## 2021-07-06 ENCOUNTER — HOSPITAL ENCOUNTER (OUTPATIENT)
Facility: MEDICAL CENTER | Age: 36
End: 2021-07-09
Attending: EMERGENCY MEDICINE | Admitting: FAMILY MEDICINE
Payer: MEDICAID

## 2021-07-06 DIAGNOSIS — K80.20 SYMPTOMATIC CHOLELITHIASIS: ICD-10-CM

## 2021-07-06 PROBLEM — R10.13 EPIGASTRIC PAIN: Status: ACTIVE | Noted: 2021-07-06

## 2021-07-06 PROBLEM — R82.71 ASYMPTOMATIC BACTERIURIA: Status: ACTIVE | Noted: 2021-07-06

## 2021-07-06 LAB
ALBUMIN SERPL BCP-MCNC: 4.4 G/DL (ref 3.2–4.9)
ALBUMIN/GLOB SERPL: 1.6 G/DL
ALP SERPL-CCNC: 97 U/L (ref 30–99)
ALT SERPL-CCNC: 17 U/L (ref 2–50)
ANION GAP SERPL CALC-SCNC: 12 MMOL/L (ref 7–16)
APPEARANCE UR: ABNORMAL
AST SERPL-CCNC: 31 U/L (ref 12–45)
BACTERIA #/AREA URNS HPF: ABNORMAL /HPF
BASOPHILS # BLD AUTO: 0.4 % (ref 0–1.8)
BASOPHILS # BLD: 0.03 K/UL (ref 0–0.12)
BILIRUB SERPL-MCNC: 1 MG/DL (ref 0.1–1.5)
BILIRUB UR QL STRIP.AUTO: NEGATIVE
BUN SERPL-MCNC: 13 MG/DL (ref 8–22)
CALCIUM SERPL-MCNC: 9.8 MG/DL (ref 8.5–10.5)
CHLORIDE SERPL-SCNC: 107 MMOL/L (ref 96–112)
CO2 SERPL-SCNC: 21 MMOL/L (ref 20–33)
COLOR UR: ABNORMAL
CREAT SERPL-MCNC: 0.72 MG/DL (ref 0.5–1.4)
EOSINOPHIL # BLD AUTO: 0.05 K/UL (ref 0–0.51)
EOSINOPHIL NFR BLD: 0.7 % (ref 0–6.9)
EPI CELLS #/AREA URNS HPF: ABNORMAL /HPF
ERYTHROCYTE [DISTWIDTH] IN BLOOD BY AUTOMATED COUNT: 44.6 FL (ref 35.9–50)
GLOBULIN SER CALC-MCNC: 2.8 G/DL (ref 1.9–3.5)
GLUCOSE SERPL-MCNC: 124 MG/DL (ref 65–99)
GLUCOSE UR STRIP.AUTO-MCNC: NEGATIVE MG/DL
HCT VFR BLD AUTO: 46.2 % (ref 37–47)
HGB BLD-MCNC: 15.7 G/DL (ref 12–16)
HYALINE CASTS #/AREA URNS LPF: ABNORMAL /LPF
IMM GRANULOCYTES # BLD AUTO: 0.02 K/UL (ref 0–0.11)
IMM GRANULOCYTES NFR BLD AUTO: 0.3 % (ref 0–0.9)
KETONES UR STRIP.AUTO-MCNC: ABNORMAL MG/DL
LEUKOCYTE ESTERASE UR QL STRIP.AUTO: ABNORMAL
LIPASE SERPL-CCNC: 47 U/L (ref 11–82)
LYMPHOCYTES # BLD AUTO: 1.35 K/UL (ref 1–4.8)
LYMPHOCYTES NFR BLD: 17.8 % (ref 22–41)
MCH RBC QN AUTO: 30.4 PG (ref 27–33)
MCHC RBC AUTO-ENTMCNC: 34 G/DL (ref 33.6–35)
MCV RBC AUTO: 89.5 FL (ref 81.4–97.8)
MICRO URNS: ABNORMAL
MONOCYTES # BLD AUTO: 0.38 K/UL (ref 0–0.85)
MONOCYTES NFR BLD AUTO: 5 % (ref 0–13.4)
MUCOUS THREADS #/AREA URNS HPF: ABNORMAL /HPF
NEUTROPHILS # BLD AUTO: 5.74 K/UL (ref 2–7.15)
NEUTROPHILS NFR BLD: 75.8 % (ref 44–72)
NITRITE UR QL STRIP.AUTO: NEGATIVE
NRBC # BLD AUTO: 0 K/UL
NRBC BLD-RTO: 0 /100 WBC
PH UR STRIP.AUTO: 7 [PH] (ref 5–8)
PLATELET # BLD AUTO: 207 K/UL (ref 164–446)
PMV BLD AUTO: 10.9 FL (ref 9–12.9)
POTASSIUM SERPL-SCNC: 4.2 MMOL/L (ref 3.6–5.5)
PROT SERPL-MCNC: 7.2 G/DL (ref 6–8.2)
PROT UR QL STRIP: 30 MG/DL
RBC # BLD AUTO: 5.16 M/UL (ref 4.2–5.4)
RBC # URNS HPF: ABNORMAL /HPF
RBC UR QL AUTO: NEGATIVE
SARS-COV+SARS-COV-2 AG RESP QL IA.RAPID: NOTDETECTED
SODIUM SERPL-SCNC: 140 MMOL/L (ref 135–145)
SP GR UR STRIP.AUTO: 1.03
SPECIMEN SOURCE: NORMAL
UROBILINOGEN UR STRIP.AUTO-MCNC: 1 MG/DL
WBC # BLD AUTO: 7.6 K/UL (ref 4.8–10.8)
WBC #/AREA URNS HPF: ABNORMAL /HPF

## 2021-07-06 PROCEDURE — G0378 HOSPITAL OBSERVATION PER HR: HCPCS

## 2021-07-06 PROCEDURE — 700111 HCHG RX REV CODE 636 W/ 250 OVERRIDE (IP): Performed by: EMERGENCY MEDICINE

## 2021-07-06 PROCEDURE — A9270 NON-COVERED ITEM OR SERVICE: HCPCS | Performed by: EMERGENCY MEDICINE

## 2021-07-06 PROCEDURE — A9270 NON-COVERED ITEM OR SERVICE: HCPCS | Performed by: FAMILY MEDICINE

## 2021-07-06 PROCEDURE — 96375 TX/PRO/DX INJ NEW DRUG ADDON: CPT

## 2021-07-06 PROCEDURE — 83690 ASSAY OF LIPASE: CPT

## 2021-07-06 PROCEDURE — 700105 HCHG RX REV CODE 258: Performed by: FAMILY MEDICINE

## 2021-07-06 PROCEDURE — 81001 URINALYSIS AUTO W/SCOPE: CPT

## 2021-07-06 PROCEDURE — 700102 HCHG RX REV CODE 250 W/ 637 OVERRIDE(OP): Performed by: EMERGENCY MEDICINE

## 2021-07-06 PROCEDURE — 74181 MRI ABDOMEN W/O CONTRAST: CPT

## 2021-07-06 PROCEDURE — 87086 URINE CULTURE/COLONY COUNT: CPT

## 2021-07-06 PROCEDURE — 700102 HCHG RX REV CODE 250 W/ 637 OVERRIDE(OP): Performed by: FAMILY MEDICINE

## 2021-07-06 PROCEDURE — 76705 ECHO EXAM OF ABDOMEN: CPT

## 2021-07-06 PROCEDURE — 700111 HCHG RX REV CODE 636 W/ 250 OVERRIDE (IP): Performed by: FAMILY MEDICINE

## 2021-07-06 PROCEDURE — 85025 COMPLETE CBC W/AUTO DIFF WBC: CPT

## 2021-07-06 PROCEDURE — C9803 HOPD COVID-19 SPEC COLLECT: HCPCS | Performed by: EMERGENCY MEDICINE

## 2021-07-06 PROCEDURE — 96376 TX/PRO/DX INJ SAME DRUG ADON: CPT

## 2021-07-06 PROCEDURE — 96374 THER/PROPH/DIAG INJ IV PUSH: CPT

## 2021-07-06 PROCEDURE — 99219 PR INITIAL OBSERVATION CARE,LEVL II: CPT | Performed by: FAMILY MEDICINE

## 2021-07-06 PROCEDURE — 80053 COMPREHEN METABOLIC PANEL: CPT

## 2021-07-06 PROCEDURE — 99285 EMERGENCY DEPT VISIT HI MDM: CPT

## 2021-07-06 PROCEDURE — 87426 SARSCOV CORONAVIRUS AG IA: CPT

## 2021-07-06 RX ORDER — BISACODYL 10 MG
10 SUPPOSITORY, RECTAL RECTAL
Status: DISCONTINUED | OUTPATIENT
Start: 2021-07-06 | End: 2021-07-09 | Stop reason: HOSPADM

## 2021-07-06 RX ORDER — POLYETHYLENE GLYCOL 3350 17 G/17G
1 POWDER, FOR SOLUTION ORAL
Status: DISCONTINUED | OUTPATIENT
Start: 2021-07-06 | End: 2021-07-09 | Stop reason: HOSPADM

## 2021-07-06 RX ORDER — PROCHLORPERAZINE EDISYLATE 5 MG/ML
5-10 INJECTION INTRAMUSCULAR; INTRAVENOUS EVERY 4 HOURS PRN
Status: DISCONTINUED | OUTPATIENT
Start: 2021-07-06 | End: 2021-07-09 | Stop reason: HOSPADM

## 2021-07-06 RX ORDER — SUCRALFATE 1 G/1
1 TABLET ORAL 4 TIMES DAILY PRN
COMMUNITY
End: 2021-10-14

## 2021-07-06 RX ORDER — ONDANSETRON 2 MG/ML
4 INJECTION INTRAMUSCULAR; INTRAVENOUS ONCE
Status: COMPLETED | OUTPATIENT
Start: 2021-07-06 | End: 2021-07-06

## 2021-07-06 RX ORDER — ENALAPRILAT 1.25 MG/ML
1.25 INJECTION INTRAVENOUS EVERY 6 HOURS PRN
Status: DISCONTINUED | OUTPATIENT
Start: 2021-07-06 | End: 2021-07-09 | Stop reason: HOSPADM

## 2021-07-06 RX ORDER — PROMETHAZINE HYDROCHLORIDE 25 MG/1
12.5-25 SUPPOSITORY RECTAL EVERY 4 HOURS PRN
Status: DISCONTINUED | OUTPATIENT
Start: 2021-07-06 | End: 2021-07-09 | Stop reason: HOSPADM

## 2021-07-06 RX ORDER — SUCRALFATE 1 G/1
1 TABLET ORAL
Status: DISCONTINUED | OUTPATIENT
Start: 2021-07-06 | End: 2021-07-09 | Stop reason: HOSPADM

## 2021-07-06 RX ORDER — CLONAZEPAM 0.5 MG/1
0.5 TABLET ORAL 2 TIMES DAILY PRN
Status: DISCONTINUED | OUTPATIENT
Start: 2021-07-06 | End: 2021-07-09 | Stop reason: HOSPADM

## 2021-07-06 RX ORDER — OMEPRAZOLE 20 MG/1
20 CAPSULE, DELAYED RELEASE ORAL DAILY
COMMUNITY
End: 2021-10-14

## 2021-07-06 RX ORDER — MORPHINE SULFATE 4 MG/ML
4 INJECTION, SOLUTION INTRAMUSCULAR; INTRAVENOUS EVERY 4 HOURS PRN
Status: DISCONTINUED | OUTPATIENT
Start: 2021-07-06 | End: 2021-07-08

## 2021-07-06 RX ORDER — OXYCODONE HYDROCHLORIDE 5 MG/1
5 TABLET ORAL EVERY 4 HOURS PRN
Status: DISCONTINUED | OUTPATIENT
Start: 2021-07-06 | End: 2021-07-07

## 2021-07-06 RX ORDER — PROMETHAZINE HYDROCHLORIDE 25 MG/1
12.5-25 TABLET ORAL EVERY 4 HOURS PRN
Status: DISCONTINUED | OUTPATIENT
Start: 2021-07-06 | End: 2021-07-09 | Stop reason: HOSPADM

## 2021-07-06 RX ORDER — MORPHINE SULFATE 4 MG/ML
4 INJECTION, SOLUTION INTRAMUSCULAR; INTRAVENOUS ONCE
Status: COMPLETED | OUTPATIENT
Start: 2021-07-06 | End: 2021-07-06

## 2021-07-06 RX ORDER — LABETALOL HYDROCHLORIDE 5 MG/ML
10 INJECTION, SOLUTION INTRAVENOUS EVERY 4 HOURS PRN
Status: DISCONTINUED | OUTPATIENT
Start: 2021-07-06 | End: 2021-07-09 | Stop reason: HOSPADM

## 2021-07-06 RX ORDER — CLONIDINE HYDROCHLORIDE 0.1 MG/1
0.1 TABLET ORAL EVERY 6 HOURS PRN
Status: DISCONTINUED | OUTPATIENT
Start: 2021-07-06 | End: 2021-07-09 | Stop reason: HOSPADM

## 2021-07-06 RX ORDER — CLONAZEPAM 0.5 MG/1
0.5 TABLET ORAL PRN
COMMUNITY
End: 2021-10-14

## 2021-07-06 RX ORDER — ONDANSETRON 4 MG/1
4 TABLET, ORALLY DISINTEGRATING ORAL EVERY 4 HOURS PRN
Status: DISCONTINUED | OUTPATIENT
Start: 2021-07-06 | End: 2021-07-09 | Stop reason: HOSPADM

## 2021-07-06 RX ORDER — ONDANSETRON 2 MG/ML
4 INJECTION INTRAMUSCULAR; INTRAVENOUS EVERY 4 HOURS PRN
Status: DISCONTINUED | OUTPATIENT
Start: 2021-07-06 | End: 2021-07-09 | Stop reason: HOSPADM

## 2021-07-06 RX ORDER — OMEPRAZOLE 20 MG/1
20 CAPSULE, DELAYED RELEASE ORAL 2 TIMES DAILY
Status: DISCONTINUED | OUTPATIENT
Start: 2021-07-06 | End: 2021-07-09 | Stop reason: HOSPADM

## 2021-07-06 RX ORDER — AMOXICILLIN 250 MG
2 CAPSULE ORAL 2 TIMES DAILY
Status: DISCONTINUED | OUTPATIENT
Start: 2021-07-06 | End: 2021-07-09 | Stop reason: HOSPADM

## 2021-07-06 RX ORDER — DEXTROSE AND SODIUM CHLORIDE 5; .45 G/100ML; G/100ML
INJECTION, SOLUTION INTRAVENOUS CONTINUOUS
Status: DISCONTINUED | OUTPATIENT
Start: 2021-07-06 | End: 2021-07-08

## 2021-07-06 RX ADMIN — DOCUSATE SODIUM 50 MG AND SENNOSIDES 8.6 MG 2 TABLET: 8.6; 5 TABLET, FILM COATED ORAL at 21:08

## 2021-07-06 RX ADMIN — MORPHINE SULFATE 4 MG: 4 INJECTION INTRAVENOUS at 17:55

## 2021-07-06 RX ADMIN — OMEPRAZOLE 20 MG: 20 CAPSULE, DELAYED RELEASE ORAL at 21:08

## 2021-07-06 RX ADMIN — ONDANSETRON 4 MG: 2 INJECTION INTRAMUSCULAR; INTRAVENOUS at 17:53

## 2021-07-06 RX ADMIN — DEXTROSE AND SODIUM CHLORIDE: 5; 450 INJECTION, SOLUTION INTRAVENOUS at 18:58

## 2021-07-06 RX ADMIN — ONDANSETRON 4 MG: 2 INJECTION INTRAMUSCULAR; INTRAVENOUS at 23:14

## 2021-07-06 RX ADMIN — MORPHINE SULFATE 4 MG: 4 INJECTION, SOLUTION INTRAMUSCULAR; INTRAVENOUS at 23:19

## 2021-07-06 RX ADMIN — LIDOCAINE HYDROCHLORIDE 30 ML: 20 SOLUTION OROPHARYNGEAL at 16:30

## 2021-07-06 ASSESSMENT — PAIN DESCRIPTION - PAIN TYPE
TYPE: ACUTE PAIN
TYPE: ACUTE PAIN

## 2021-07-06 ASSESSMENT — ENCOUNTER SYMPTOMS
HEADACHES: 0
NAUSEA: 1
NECK PAIN: 0
DIARRHEA: 0
DEPRESSION: 0
MYALGIAS: 0
BLURRED VISION: 0
CHILLS: 1
VOMITING: 1
CONSTIPATION: 1
BLOOD IN STOOL: 0
FEVER: 0
PALPITATIONS: 0
TINGLING: 0
ABDOMINAL PAIN: 1
HEARTBURN: 0
COUGH: 0
DOUBLE VISION: 0
HEMOPTYSIS: 0
WEIGHT LOSS: 1
DIZZINESS: 0
FLANK PAIN: 0

## 2021-07-06 ASSESSMENT — LIFESTYLE VARIABLES
ON A TYPICAL DAY WHEN YOU DRINK ALCOHOL HOW MANY DRINKS DO YOU HAVE: 0
HOW MANY TIMES IN THE PAST YEAR HAVE YOU HAD 5 OR MORE DRINKS IN A DAY: 0
HAVE PEOPLE ANNOYED YOU BY CRITICIZING YOUR DRINKING: NO
HAVE YOU EVER FELT YOU SHOULD CUT DOWN ON YOUR DRINKING: NO
TOTAL SCORE: 0
AVERAGE NUMBER OF DAYS PER WEEK YOU HAVE A DRINK CONTAINING ALCOHOL: 0
ALCOHOL_USE: NO
CONSUMPTION TOTAL: NEGATIVE
TOTAL SCORE: 0
EVER HAD A DRINK FIRST THING IN THE MORNING TO STEADY YOUR NERVES TO GET RID OF A HANGOVER: NO
EVER FELT BAD OR GUILTY ABOUT YOUR DRINKING: NO
TOTAL SCORE: 0

## 2021-07-06 ASSESSMENT — PATIENT HEALTH QUESTIONNAIRE - PHQ9
2. FEELING DOWN, DEPRESSED, IRRITABLE, OR HOPELESS: NOT AT ALL
1. LITTLE INTEREST OR PLEASURE IN DOING THINGS: NOT AT ALL
SUM OF ALL RESPONSES TO PHQ9 QUESTIONS 1 AND 2: 0

## 2021-07-06 ASSESSMENT — FIBROSIS 4 INDEX
FIB4 SCORE: 1.23
FIB4 SCORE: 1.23

## 2021-07-06 NOTE — ED TRIAGE NOTES
Pt presents to ED via EMS with complaint of right sided abdominal pain. States onset this afternoon. States approx 1 month ago she was seen at different facility and told her gallbladder might need to be removed. States hx of gastric sleeve Mar 2021.

## 2021-07-06 NOTE — ED PROVIDER NOTES
ED Provider Note    CHIEF COMPLAINT  Chief Complaint   Patient presents with   • Abdominal Pain     Right side onset approx 1430.       HPI  Yasemin Bradley is a 36 y.o. female who presents to the emergency department with complaint of abdominal pain.  The patient states that she had profound abdominal pain earlier this afternoon.  The pain is epigastric and right upper quadrant, is crampy in nature, dropped to her to all fours and she is crying and screaming in pain.  She does have a history in the past of gastric sleeve but is never caused this amount of pain.  In addition she was recently diagnosed with gallstones.  She denies fever, shakes, chills, sweats, she has had nausea but no vomiting.  She tried to take her PPI without help.    REVIEW OF SYSTEMS  Positives as above. Pertinent negatives include fever, shakes, chills, sweats, nausea, vomiting, lightness, dizziness  All other 10 review of systems are negative    PAST MEDICAL HISTORY  Past Medical History:   Diagnosis Date   • Anesthesia     slow to wake up   • Arrhythmia     PVCs and PACs, has had holter in the past   • ASTHMA     in the past, no problem now   • Cold April 2017    upper respiratory infection   • Colitis    • Dysmenorrhea    • Fibromyalgia    • Heart murmur     small murmur   • Indigestion    • Narcotic abuse (HCC)    • Ovarian cyst    • PID (pelvic inflammatory disease)    • Psychiatric problem     anxiety, ADHD   • PVC's (premature ventricular contractions)    • Snoring    • Urinary bladder disorder     frequent UTI's       FAMILY HISTORY  Noncontributory    SOCIAL HISTORY  Social History     Socioeconomic History   • Marital status: Single     Spouse name: Not on file   • Number of children: Not on file   • Years of education: Not on file   • Highest education level: Not on file   Occupational History   • Not on file   Tobacco Use   • Smoking status: Former Smoker     Types: Cigarettes     Quit date: 1/1/2007     Years since quitting:  14.5   • Smokeless tobacco: Never Used   Vaping Use   • Vaping Use: Never used   Substance and Sexual Activity   • Alcohol use: No   • Drug use: No     Types: Oral     Comment: hx of opiate addiction, clean since 12/12/12   • Sexual activity: Not on file   Other Topics Concern   • Not on file   Social History Narrative   • Not on file     Social Determinants of Health     Financial Resource Strain:    • Difficulty of Paying Living Expenses:    Food Insecurity:    • Worried About Running Out of Food in the Last Year:    • Ran Out of Food in the Last Year:    Transportation Needs:    • Lack of Transportation (Medical):    • Lack of Transportation (Non-Medical):    Physical Activity:    • Days of Exercise per Week:    • Minutes of Exercise per Session:    Stress:    • Feeling of Stress :    Social Connections:    • Frequency of Communication with Friends and Family:    • Frequency of Social Gatherings with Friends and Family:    • Attends Yazdanism Services:    • Active Member of Clubs or Organizations:    • Attends Club or Organization Meetings:    • Marital Status:    Intimate Partner Violence:    • Fear of Current or Ex-Partner:    • Emotionally Abused:    • Physically Abused:    • Sexually Abused:        SURGICAL HISTORY  Past Surgical History:   Procedure Laterality Date   • PB LAP, CASTRO RESTRICT PROC, LONGITUDINAL GAS* N/A 3/15/2021    Procedure: GASTRECTOMY, SLEEVE, LAPAROSCOPIC.;  Surgeon: John H Ganser, M.D.;  Location: [facility];  Service: General   • HYSTERECTOMY LAPAROSCOPY N/A 11/21/2019    Procedure: HYSTERECTOMY, LAPAROSCOPIC;  Surgeon: Jericho Du M.D.;  Location: SURGERY SAME DAY [facility];  Service: Gynecology   • SALPINGECTOMY Bilateral 11/21/2019    Procedure: SALPINGECTOMY;  Surgeon: Jericho Du M.D.;  Location: SURGERY SAME DAY [facility];  Service: Gynecology   • CYSTOSCOPY N/A 11/21/2019    Procedure: CYSTOSCOPY;  Surgeon: Jericho Du M.D.;  Location: SURGERY SAME  "DAY [facility];  Service: Gynecology   • HYSTEROSCOPY THERMAL ABLATION N/A 7/10/2017    Procedure: HYSTEROSCOPY THERMAL ABLATION endometrial ;  Surgeon: Jericho Fields M.D.;  Location: SURGERY SAME DAY [facility];  Service:    • TUBAL COAGULATION LAPAROSCOPIC BILATERAL Bilateral 7/10/2017    Procedure: TUBAL COAGULATION LAPAROSCOPIC BILATERAL;  Surgeon: Jericho Fields M.D.;  Location: SURGERY SAME DAY [facility];  Service:    • DILATION AND CURETTAGE N/A 7/10/2017    Procedure: DILATION AND CURETTAGE;  Surgeon: Jericho Fields M.D.;  Location: SURGERY SAME DAY [facility];  Service:    • PRIMARY C SECTION  12/19/2010    Performed by JERICHO FIELDS at LABOR AND DELIVERY   • OTHER       T & A  age 14        CURRENT MEDICATIONS  Home Medications    **Home medications have not yet been reviewed for this encounter**         ALLERGIES  Allergies   Allergen Reactions   • Reglan [Kdc:Yellow Dye+Ci Pigment Blue 63+Metoclopramide]      Involuntary motor tick       PHYSICAL EXAM  VITAL SIGNS: /68   Pulse 99   Temp 37 °C (98.6 °F) (Temporal)   Resp 18   Ht 1.626 m (5' 4\")   LMP 11/20/2019   SpO2 98%   BMI 39.99 kg/m²      Constitutional: Well developed, Well nourished, No acute distress, Non-toxic appearance.   Eyes: PERRLA, EOMI, Conjunctiva normal, No discharge.   Cardiovascular: Normal heart rate, Normal rhythm, No murmurs, No rubs, No gallops, and intact distal pulses.   Thorax & Lungs:  No respiratory distress, no rales, no rhonchi, No wheezing, No chest wall tenderness.   Abdomen: Bowel sounds normal, Soft, No tenderness, No guarding, No rebound, No pulsatile masses.   Skin: Warm, Dry, No erythema, No rash.   Extremities: Full range of motion, no deformity, no edema.  Neurologic: Alert & oriented x 3, No focal deficits noted, acting appropriately on exam.  Psychiatric: Anxious positive Aparicio sign, slight epigastric tenderness      RADIOLOGY/PROCEDURES  US-RUQ   Final Result      1.  There is " cholelithiasis.      2.  Common bile duct is mildly dilated. No common duct stone is directly visualized.      [id number]    (Results Pending)     Results for orders placed or performed during the hospital encounter of 07/06/21   CBC WITH DIFFERENTIAL   Result Value Ref Range    WBC 7.6 4.8 - 10.8 K/uL    RBC 5.16 4.20 - 5.40 M/uL    Hemoglobin 15.7 12.0 - 16.0 g/dL    Hematocrit 46.2 37.0 - 47.0 %    MCV 89.5 81.4 - 97.8 fL    MCH 30.4 27.0 - 33.0 pg    MCHC 34.0 33.6 - 35.0 g/dL    RDW 44.6 35.9 - 50.0 fL    Platelet Count 207 164 - 446 K/uL    MPV 10.9 9.0 - 12.9 fL    Neutrophils-Polys 75.80 (H) 44.00 - 72.00 %    Lymphocytes 17.80 (L) 22.00 - 41.00 %    Monocytes 5.00 0.00 - 13.40 %    Eosinophils 0.70 0.00 - 6.90 %    Basophils 0.40 0.00 - 1.80 %    Immature Granulocytes 0.30 0.00 - 0.90 %    Nucleated RBC 0.00 /100 WBC    Neutrophils (Absolute) 5.74 2.00 - 7.15 K/uL    Lymphs (Absolute) 1.35 1.00 - 4.80 K/uL    Monos (Absolute) 0.38 0.00 - 0.85 K/uL    Eos (Absolute) 0.05 0.00 - 0.51 K/uL    Baso (Absolute) 0.03 0.00 - 0.12 K/uL    Immature Granulocytes (abs) 0.02 0.00 - 0.11 K/uL    NRBC (Absolute) 0.00 K/uL   COMP METABOLIC PANEL   Result Value Ref Range    Sodium 140 135 - 145 mmol/L    Potassium 4.2 3.6 - 5.5 mmol/L    Chloride 107 96 - 112 mmol/L    Co2 21 20 - 33 mmol/L    Anion Gap 12.0 7.0 - 16.0    Glucose 124 (H) 65 - 99 mg/dL    Bun 13 8 - 22 mg/dL    Creatinine 0.72 0.50 - 1.40 mg/dL    Calcium 9.8 8.5 - 10.5 mg/dL    AST(SGOT) 31 12 - 45 U/L    ALT(SGPT) 17 2 - 50 U/L    Alkaline Phosphatase 97 30 - 99 U/L    Total Bilirubin 1.0 0.1 - 1.5 mg/dL    Albumin 4.4 3.2 - 4.9 g/dL    Total Protein 7.2 6.0 - 8.2 g/dL    Globulin 2.8 1.9 - 3.5 g/dL    A-G Ratio 1.6 g/dL   LIPASE   Result Value Ref Range    Lipase 47 11 - 82 U/L   URINALYSIS (UA)    Specimen: Urine   Result Value Ref Range    Color DK Yellow     Character Cloudy (A)     Specific Gravity 1.029 <1.035    Ph 7.0 5.0 - 8.0    Glucose  Negative Negative mg/dL    Ketones Trace (A) Negative mg/dL    Protein 30 (A) Negative mg/dL    Bilirubin Negative Negative    Urobilinogen, Urine 1.0 Negative    Nitrite Negative Negative    Leukocyte Esterase Trace (A) Negative    Occult Blood Negative Negative    Micro Urine Req Microscopic    ESTIMATED GFR   Result Value Ref Range    GFR If African American >60 >60 mL/min/1.73 m 2    GFR If Non African American >60 >60 mL/min/1.73 m 2   URINE MICROSCOPIC (W/UA)   Result Value Ref Range    WBC 5-10 (A) /hpf    RBC 10-20 (A) /hpf    Bacteria Moderate (A) None /hpf    Epithelial Cells Many (A) /hpf    Mucous Threads Few /hpf    Hyaline Cast 0-2 /lpf   SARS-COV Antigen FAWAD: Collect dry nasal swab   Result Value Ref Range    SARS-CoV-2 Source Nasal Swab     SARS-COV ANTIGEN FAWAD NotDetected Not-Detected         COURSE & MEDICAL DECISION MAKING  Pertinent Labs & Imaging studies reviewed. (See chart for details)  Is a pleasant 36-year-old female who presents with severe abdominal pain.  Here in the emergency department, is concern for possible cholecystitis therefore ultrasound was completed.  Ultrasound did reveal evidence of cholelithiasis and slightly dilated common bile duct.  Although her LFTs are normal I am concerned that she might have an acute choledocholithiasis secondary to acute pain pattern as well as dilatation the common bile duct.  The patient does not have evidence of cholecystitis and not require surgical consultation or antibiotics.  I discussed the patient with hospitalist who will be hospitalizing the patient and doing MRCP.  In addition, the MRCP is positive she will be undergoing something GI consultation as well as possible surgical consultation.  The patient received morphine 4 mg IV as well as Zofran here in the emergency department significant fever symptomatology.      FINAL IMPRESSION  Biliary colic  Cholelithiasis           Electronically signed by: Keanu Calvert D.O., 7/6/2021  4:04 PM

## 2021-07-07 ENCOUNTER — ANESTHESIA (OUTPATIENT)
Dept: SURGERY | Facility: MEDICAL CENTER | Age: 36
End: 2021-07-07
Payer: MEDICAID

## 2021-07-07 ENCOUNTER — ANESTHESIA EVENT (OUTPATIENT)
Dept: SURGERY | Facility: MEDICAL CENTER | Age: 36
End: 2021-07-07
Payer: MEDICAID

## 2021-07-07 PROBLEM — R10.11 RIGHT UPPER QUADRANT PAIN: Status: ACTIVE | Noted: 2021-07-06

## 2021-07-07 PROBLEM — Z98.890 STATUS POST GASTRIC SURGERY: Status: ACTIVE | Noted: 2021-07-07

## 2021-07-07 LAB
ALBUMIN SERPL BCP-MCNC: 3.5 G/DL (ref 3.2–4.9)
ALBUMIN/GLOB SERPL: 1.7 G/DL
ALP SERPL-CCNC: 89 U/L (ref 30–99)
ALT SERPL-CCNC: 17 U/L (ref 2–50)
ANION GAP SERPL CALC-SCNC: 6 MMOL/L (ref 7–16)
AST SERPL-CCNC: 23 U/L (ref 12–45)
BASOPHILS # BLD AUTO: 0.5 % (ref 0–1.8)
BASOPHILS # BLD: 0.04 K/UL (ref 0–0.12)
BILIRUB SERPL-MCNC: 0.7 MG/DL (ref 0.1–1.5)
BUN SERPL-MCNC: 10 MG/DL (ref 8–22)
CALCIUM SERPL-MCNC: 8.6 MG/DL (ref 8.5–10.5)
CHLORIDE SERPL-SCNC: 106 MMOL/L (ref 96–112)
CO2 SERPL-SCNC: 24 MMOL/L (ref 20–33)
CREAT SERPL-MCNC: 0.57 MG/DL (ref 0.5–1.4)
EOSINOPHIL # BLD AUTO: 0.11 K/UL (ref 0–0.51)
EOSINOPHIL NFR BLD: 1.3 % (ref 0–6.9)
ERYTHROCYTE [DISTWIDTH] IN BLOOD BY AUTOMATED COUNT: 45.1 FL (ref 35.9–50)
GLOBULIN SER CALC-MCNC: 2.1 G/DL (ref 1.9–3.5)
GLUCOSE SERPL-MCNC: 102 MG/DL (ref 65–99)
HCT VFR BLD AUTO: 39 % (ref 37–47)
HGB BLD-MCNC: 13.4 G/DL (ref 12–16)
IMM GRANULOCYTES # BLD AUTO: 0.04 K/UL (ref 0–0.11)
IMM GRANULOCYTES NFR BLD AUTO: 0.5 % (ref 0–0.9)
LACTATE BLD-SCNC: 0.7 MMOL/L (ref 0.5–2)
LYMPHOCYTES # BLD AUTO: 2.38 K/UL (ref 1–4.8)
LYMPHOCYTES NFR BLD: 29 % (ref 22–41)
MCH RBC QN AUTO: 31.2 PG (ref 27–33)
MCHC RBC AUTO-ENTMCNC: 34.4 G/DL (ref 33.6–35)
MCV RBC AUTO: 90.9 FL (ref 81.4–97.8)
MONOCYTES # BLD AUTO: 0.48 K/UL (ref 0–0.85)
MONOCYTES NFR BLD AUTO: 5.8 % (ref 0–13.4)
NEUTROPHILS # BLD AUTO: 5.17 K/UL (ref 2–7.15)
NEUTROPHILS NFR BLD: 62.9 % (ref 44–72)
NRBC # BLD AUTO: 0 K/UL
NRBC BLD-RTO: 0 /100 WBC
PLATELET # BLD AUTO: 175 K/UL (ref 164–446)
PMV BLD AUTO: 10.5 FL (ref 9–12.9)
POTASSIUM SERPL-SCNC: 3.5 MMOL/L (ref 3.6–5.5)
PROT SERPL-MCNC: 5.6 G/DL (ref 6–8.2)
RBC # BLD AUTO: 4.29 M/UL (ref 4.2–5.4)
SODIUM SERPL-SCNC: 136 MMOL/L (ref 135–145)
WBC # BLD AUTO: 8.2 K/UL (ref 4.8–10.8)

## 2021-07-07 PROCEDURE — 160041 HCHG SURGERY MINUTES - EA ADDL 1 MIN LEVEL 4: Performed by: SURGERY

## 2021-07-07 PROCEDURE — 501571 HCHG TROCAR, SEPARATOR 12X100: Performed by: SURGERY

## 2021-07-07 PROCEDURE — 501450 HCHG STAPLES, ENDO MULTIFIRE: Performed by: SURGERY

## 2021-07-07 PROCEDURE — 501583 HCHG TROCAR, THRD CAN&SEAL 5X100: Performed by: SURGERY

## 2021-07-07 PROCEDURE — 700105 HCHG RX REV CODE 258: Performed by: FAMILY MEDICINE

## 2021-07-07 PROCEDURE — 501399 HCHG SPECIMAN BAG, ENDO CATC: Performed by: SURGERY

## 2021-07-07 PROCEDURE — 99225 PR SUBSEQUENT OBSERVATION CARE,LEVEL II: CPT | Performed by: NURSE PRACTITIONER

## 2021-07-07 PROCEDURE — 96375 TX/PRO/DX INJ NEW DRUG ADDON: CPT | Mod: XU

## 2021-07-07 PROCEDURE — 700111 HCHG RX REV CODE 636 W/ 250 OVERRIDE (IP): Performed by: INTERNAL MEDICINE

## 2021-07-07 PROCEDURE — 700111 HCHG RX REV CODE 636 W/ 250 OVERRIDE (IP): Performed by: NURSE PRACTITIONER

## 2021-07-07 PROCEDURE — 88304 TISSUE EXAM BY PATHOLOGIST: CPT

## 2021-07-07 PROCEDURE — 160048 HCHG OR STATISTICAL LEVEL 1-5: Performed by: SURGERY

## 2021-07-07 PROCEDURE — 160002 HCHG RECOVERY MINUTES (STAT): Performed by: SURGERY

## 2021-07-07 PROCEDURE — 83605 ASSAY OF LACTIC ACID: CPT

## 2021-07-07 PROCEDURE — 160035 HCHG PACU - 1ST 60 MINS PHASE I: Performed by: SURGERY

## 2021-07-07 PROCEDURE — A9270 NON-COVERED ITEM OR SERVICE: HCPCS | Performed by: FAMILY MEDICINE

## 2021-07-07 PROCEDURE — 85025 COMPLETE CBC W/AUTO DIFF WBC: CPT

## 2021-07-07 PROCEDURE — 96376 TX/PRO/DX INJ SAME DRUG ADON: CPT

## 2021-07-07 PROCEDURE — 501338 HCHG SHEARS, ENDO: Performed by: SURGERY

## 2021-07-07 PROCEDURE — 501568 HCHG TROCAR, BLUNTPORT 12MM: Performed by: SURGERY

## 2021-07-07 PROCEDURE — 80053 COMPREHEN METABOLIC PANEL: CPT

## 2021-07-07 PROCEDURE — 700111 HCHG RX REV CODE 636 W/ 250 OVERRIDE (IP): Performed by: FAMILY MEDICINE

## 2021-07-07 PROCEDURE — 700105 HCHG RX REV CODE 258: Performed by: SURGERY

## 2021-07-07 PROCEDURE — 160029 HCHG SURGERY MINUTES - 1ST 30 MINS LEVEL 4: Performed by: SURGERY

## 2021-07-07 PROCEDURE — 160009 HCHG ANES TIME/MIN: Performed by: SURGERY

## 2021-07-07 PROCEDURE — 700105 HCHG RX REV CODE 258: Performed by: ANESTHESIOLOGY

## 2021-07-07 PROCEDURE — 501570 HCHG TROCAR, SEPARATOR: Performed by: SURGERY

## 2021-07-07 PROCEDURE — 700102 HCHG RX REV CODE 250 W/ 637 OVERRIDE(OP): Performed by: STUDENT IN AN ORGANIZED HEALTH CARE EDUCATION/TRAINING PROGRAM

## 2021-07-07 PROCEDURE — 700101 HCHG RX REV CODE 250: Performed by: ANESTHESIOLOGY

## 2021-07-07 PROCEDURE — 502571 HCHG PACK, LAP CHOLE: Performed by: SURGERY

## 2021-07-07 PROCEDURE — G0378 HOSPITAL OBSERVATION PER HR: HCPCS

## 2021-07-07 PROCEDURE — 700101 HCHG RX REV CODE 250: Performed by: SURGERY

## 2021-07-07 PROCEDURE — A9270 NON-COVERED ITEM OR SERVICE: HCPCS | Performed by: STUDENT IN AN ORGANIZED HEALTH CARE EDUCATION/TRAINING PROGRAM

## 2021-07-07 PROCEDURE — 700102 HCHG RX REV CODE 250 W/ 637 OVERRIDE(OP): Performed by: FAMILY MEDICINE

## 2021-07-07 PROCEDURE — 501838 HCHG SUTURE GENERAL: Performed by: SURGERY

## 2021-07-07 PROCEDURE — 700111 HCHG RX REV CODE 636 W/ 250 OVERRIDE (IP): Performed by: ANESTHESIOLOGY

## 2021-07-07 PROCEDURE — 700104 HCHG RX REV CODE 254: Performed by: SURGERY

## 2021-07-07 RX ORDER — HYDROMORPHONE HYDROCHLORIDE 1 MG/ML
0.4 INJECTION, SOLUTION INTRAMUSCULAR; INTRAVENOUS; SUBCUTANEOUS
Status: DISCONTINUED | OUTPATIENT
Start: 2021-07-07 | End: 2021-07-08 | Stop reason: HOSPADM

## 2021-07-07 RX ORDER — MAGNESIUM HYDROXIDE 1200 MG/15ML
LIQUID ORAL
Status: DISCONTINUED | OUTPATIENT
Start: 2021-07-07 | End: 2021-07-07 | Stop reason: HOSPADM

## 2021-07-07 RX ORDER — SODIUM CHLORIDE, SODIUM LACTATE, POTASSIUM CHLORIDE, CALCIUM CHLORIDE 600; 310; 30; 20 MG/100ML; MG/100ML; MG/100ML; MG/100ML
INJECTION, SOLUTION INTRAVENOUS
Status: DISCONTINUED | OUTPATIENT
Start: 2021-07-07 | End: 2021-07-07 | Stop reason: SURG

## 2021-07-07 RX ORDER — LORAZEPAM 2 MG/ML
0.5 INJECTION INTRAMUSCULAR
Status: DISCONTINUED | OUTPATIENT
Start: 2021-07-07 | End: 2021-07-08 | Stop reason: HOSPADM

## 2021-07-07 RX ORDER — ACETAMINOPHEN 325 MG/1
650 TABLET ORAL EVERY 4 HOURS PRN
Status: DISCONTINUED | OUTPATIENT
Start: 2021-07-07 | End: 2021-07-09 | Stop reason: HOSPADM

## 2021-07-07 RX ORDER — CEFAZOLIN SODIUM 1 G/3ML
INJECTION, POWDER, FOR SOLUTION INTRAMUSCULAR; INTRAVENOUS PRN
Status: DISCONTINUED | OUTPATIENT
Start: 2021-07-07 | End: 2021-07-07 | Stop reason: SURG

## 2021-07-07 RX ORDER — DIPHENHYDRAMINE HYDROCHLORIDE 50 MG/ML
INJECTION INTRAMUSCULAR; INTRAVENOUS PRN
Status: DISCONTINUED | OUTPATIENT
Start: 2021-07-07 | End: 2021-07-07 | Stop reason: SURG

## 2021-07-07 RX ORDER — SODIUM CHLORIDE, SODIUM GLUCONATE, SODIUM ACETATE, POTASSIUM CHLORIDE AND MAGNESIUM CHLORIDE 526; 502; 368; 37; 30 MG/100ML; MG/100ML; MG/100ML; MG/100ML; MG/100ML
INJECTION, SOLUTION INTRAVENOUS
Status: DISCONTINUED | OUTPATIENT
Start: 2021-07-07 | End: 2021-07-07 | Stop reason: SURG

## 2021-07-07 RX ORDER — ROCURONIUM BROMIDE 10 MG/ML
INJECTION, SOLUTION INTRAVENOUS PRN
Status: DISCONTINUED | OUTPATIENT
Start: 2021-07-07 | End: 2021-07-07 | Stop reason: SURG

## 2021-07-07 RX ORDER — OXYCODONE HYDROCHLORIDE 5 MG/1
5 TABLET ORAL EVERY 4 HOURS PRN
Status: DISCONTINUED | OUTPATIENT
Start: 2021-07-07 | End: 2021-07-08

## 2021-07-07 RX ORDER — ONDANSETRON 2 MG/ML
INJECTION INTRAMUSCULAR; INTRAVENOUS PRN
Status: DISCONTINUED | OUTPATIENT
Start: 2021-07-07 | End: 2021-07-07 | Stop reason: SURG

## 2021-07-07 RX ORDER — SODIUM CHLORIDE, SODIUM LACTATE, POTASSIUM CHLORIDE, CALCIUM CHLORIDE 600; 310; 30; 20 MG/100ML; MG/100ML; MG/100ML; MG/100ML
INJECTION, SOLUTION INTRAVENOUS
Status: COMPLETED | OUTPATIENT
Start: 2021-07-07 | End: 2021-07-07

## 2021-07-07 RX ORDER — INDOCYANINE GREEN AND WATER 25 MG
1.25 KIT INJECTION ONCE
Status: COMPLETED | OUTPATIENT
Start: 2021-07-07 | End: 2021-07-07

## 2021-07-07 RX ORDER — DIPHENHYDRAMINE HYDROCHLORIDE 50 MG/ML
12.5 INJECTION INTRAMUSCULAR; INTRAVENOUS
Status: DISCONTINUED | OUTPATIENT
Start: 2021-07-07 | End: 2021-07-08 | Stop reason: HOSPADM

## 2021-07-07 RX ORDER — MEPERIDINE HYDROCHLORIDE 25 MG/ML
12.5 INJECTION INTRAMUSCULAR; INTRAVENOUS; SUBCUTANEOUS
Status: DISCONTINUED | OUTPATIENT
Start: 2021-07-07 | End: 2021-07-08 | Stop reason: HOSPADM

## 2021-07-07 RX ORDER — HYDRALAZINE HYDROCHLORIDE 20 MG/ML
5 INJECTION INTRAMUSCULAR; INTRAVENOUS
Status: DISCONTINUED | OUTPATIENT
Start: 2021-07-07 | End: 2021-07-08 | Stop reason: HOSPADM

## 2021-07-07 RX ORDER — OXYCODONE HCL 5 MG/5 ML
10 SOLUTION, ORAL ORAL
Status: DISCONTINUED | OUTPATIENT
Start: 2021-07-07 | End: 2021-07-08 | Stop reason: HOSPADM

## 2021-07-07 RX ORDER — KETOROLAC TROMETHAMINE 30 MG/ML
INJECTION, SOLUTION INTRAMUSCULAR; INTRAVENOUS PRN
Status: DISCONTINUED | OUTPATIENT
Start: 2021-07-07 | End: 2021-07-07 | Stop reason: SURG

## 2021-07-07 RX ORDER — INDOCYANINE GREEN AND WATER 25 MG
25 KIT INJECTION ONCE
Status: DISCONTINUED | OUTPATIENT
Start: 2021-07-07 | End: 2021-07-07

## 2021-07-07 RX ORDER — DEXAMETHASONE SODIUM PHOSPHATE 4 MG/ML
INJECTION, SOLUTION INTRA-ARTICULAR; INTRALESIONAL; INTRAMUSCULAR; INTRAVENOUS; SOFT TISSUE PRN
Status: DISCONTINUED | OUTPATIENT
Start: 2021-07-07 | End: 2021-07-07 | Stop reason: SURG

## 2021-07-07 RX ORDER — HALOPERIDOL 5 MG/ML
1 INJECTION INTRAMUSCULAR
Status: DISCONTINUED | OUTPATIENT
Start: 2021-07-07 | End: 2021-07-08 | Stop reason: HOSPADM

## 2021-07-07 RX ORDER — BUPIVACAINE HYDROCHLORIDE AND EPINEPHRINE 5; 5 MG/ML; UG/ML
INJECTION, SOLUTION EPIDURAL; INTRACAUDAL; PERINEURAL
Status: DISCONTINUED | OUTPATIENT
Start: 2021-07-07 | End: 2021-07-07 | Stop reason: HOSPADM

## 2021-07-07 RX ORDER — LIDOCAINE HYDROCHLORIDE 20 MG/ML
INJECTION, SOLUTION EPIDURAL; INFILTRATION; INTRACAUDAL; PERINEURAL PRN
Status: DISCONTINUED | OUTPATIENT
Start: 2021-07-07 | End: 2021-07-07 | Stop reason: SURG

## 2021-07-07 RX ORDER — HYDROMORPHONE HYDROCHLORIDE 2 MG/ML
INJECTION, SOLUTION INTRAMUSCULAR; INTRAVENOUS; SUBCUTANEOUS PRN
Status: DISCONTINUED | OUTPATIENT
Start: 2021-07-07 | End: 2021-07-07 | Stop reason: SURG

## 2021-07-07 RX ORDER — HYDROMORPHONE HYDROCHLORIDE 1 MG/ML
0.2 INJECTION, SOLUTION INTRAMUSCULAR; INTRAVENOUS; SUBCUTANEOUS
Status: DISCONTINUED | OUTPATIENT
Start: 2021-07-07 | End: 2021-07-08 | Stop reason: HOSPADM

## 2021-07-07 RX ORDER — HYDROMORPHONE HYDROCHLORIDE 1 MG/ML
0.5 INJECTION, SOLUTION INTRAMUSCULAR; INTRAVENOUS; SUBCUTANEOUS
Status: DISCONTINUED | OUTPATIENT
Start: 2021-07-07 | End: 2021-07-08 | Stop reason: HOSPADM

## 2021-07-07 RX ORDER — MIDAZOLAM HYDROCHLORIDE 1 MG/ML
INJECTION INTRAMUSCULAR; INTRAVENOUS PRN
Status: DISCONTINUED | OUTPATIENT
Start: 2021-07-07 | End: 2021-07-07 | Stop reason: SURG

## 2021-07-07 RX ORDER — ONDANSETRON 2 MG/ML
4 INJECTION INTRAMUSCULAR; INTRAVENOUS
Status: DISCONTINUED | OUTPATIENT
Start: 2021-07-07 | End: 2021-07-08 | Stop reason: HOSPADM

## 2021-07-07 RX ORDER — SODIUM CHLORIDE, SODIUM LACTATE, POTASSIUM CHLORIDE, CALCIUM CHLORIDE 600; 310; 30; 20 MG/100ML; MG/100ML; MG/100ML; MG/100ML
INJECTION, SOLUTION INTRAVENOUS CONTINUOUS
Status: DISCONTINUED | OUTPATIENT
Start: 2021-07-07 | End: 2021-07-08 | Stop reason: HOSPADM

## 2021-07-07 RX ORDER — OXYCODONE HCL 5 MG/5 ML
5 SOLUTION, ORAL ORAL
Status: DISCONTINUED | OUTPATIENT
Start: 2021-07-07 | End: 2021-07-08 | Stop reason: HOSPADM

## 2021-07-07 RX ORDER — OXYCODONE HYDROCHLORIDE 10 MG/1
10 TABLET ORAL EVERY 4 HOURS PRN
Status: DISCONTINUED | OUTPATIENT
Start: 2021-07-07 | End: 2021-07-08

## 2021-07-07 RX ORDER — KETOROLAC TROMETHAMINE 30 MG/ML
30 INJECTION, SOLUTION INTRAMUSCULAR; INTRAVENOUS ONCE
Status: COMPLETED | OUTPATIENT
Start: 2021-07-07 | End: 2021-07-07

## 2021-07-07 RX ORDER — CALCIUM CARBONATE 500 MG/1
500 TABLET, CHEWABLE ORAL ONCE
Status: COMPLETED | OUTPATIENT
Start: 2021-07-07 | End: 2021-07-07

## 2021-07-07 RX ADMIN — MEPERIDINE HYDROCHLORIDE 12.5 MG: 25 INJECTION INTRAMUSCULAR; INTRAVENOUS; SUBCUTANEOUS at 23:37

## 2021-07-07 RX ADMIN — KETOROLAC TROMETHAMINE 30 MG: 30 INJECTION, SOLUTION INTRAMUSCULAR at 23:09

## 2021-07-07 RX ADMIN — ROCURONIUM BROMIDE 100 MG: 10 INJECTION, SOLUTION INTRAVENOUS at 21:49

## 2021-07-07 RX ADMIN — MIDAZOLAM HYDROCHLORIDE 2 MG: 1 INJECTION, SOLUTION INTRAMUSCULAR; INTRAVENOUS at 21:45

## 2021-07-07 RX ADMIN — DEXAMETHASONE SODIUM PHOSPHATE 8 MG: 4 INJECTION, SOLUTION INTRA-ARTICULAR; INTRALESIONAL; INTRAMUSCULAR; INTRAVENOUS; SOFT TISSUE at 21:53

## 2021-07-07 RX ADMIN — MORPHINE SULFATE 4 MG: 4 INJECTION, SOLUTION INTRAMUSCULAR; INTRAVENOUS at 09:19

## 2021-07-07 RX ADMIN — FAMOTIDINE 20 MG: 10 INJECTION INTRAVENOUS at 18:45

## 2021-07-07 RX ADMIN — HYDROMORPHONE HYDROCHLORIDE 1 MG: 2 INJECTION, SOLUTION INTRAMUSCULAR; INTRAVENOUS; SUBCUTANEOUS at 23:04

## 2021-07-07 RX ADMIN — DEXTROSE AND SODIUM CHLORIDE: 5; 450 INJECTION, SOLUTION INTRAVENOUS at 16:03

## 2021-07-07 RX ADMIN — DIPHENHYDRAMINE HYDROCHLORIDE 12.5 MG: 50 INJECTION, SOLUTION INTRAMUSCULAR; INTRAVENOUS at 21:59

## 2021-07-07 RX ADMIN — CEFAZOLIN 2 G: 330 INJECTION, POWDER, FOR SOLUTION INTRAMUSCULAR; INTRAVENOUS at 21:45

## 2021-07-07 RX ADMIN — MORPHINE SULFATE 4 MG: 4 INJECTION, SOLUTION INTRAMUSCULAR; INTRAVENOUS at 14:53

## 2021-07-07 RX ADMIN — HYDROMORPHONE HYDROCHLORIDE 1 MG: 2 INJECTION, SOLUTION INTRAMUSCULAR; INTRAVENOUS; SUBCUTANEOUS at 21:47

## 2021-07-07 RX ADMIN — SUGAMMADEX 200 MG: 100 INJECTION, SOLUTION INTRAVENOUS at 23:09

## 2021-07-07 RX ADMIN — CLONAZEPAM 0.5 MG: 0.5 TABLET ORAL at 01:33

## 2021-07-07 RX ADMIN — ONDANSETRON 4 MG: 2 INJECTION INTRAMUSCULAR; INTRAVENOUS at 23:02

## 2021-07-07 RX ADMIN — ANTACID TABLETS 500 MG: 500 TABLET, CHEWABLE ORAL at 06:37

## 2021-07-07 RX ADMIN — LIDOCAINE HYDROCHLORIDE 100 MG: 20 INJECTION, SOLUTION EPIDURAL; INFILTRATION; INTRACAUDAL at 21:49

## 2021-07-07 RX ADMIN — FENTANYL CITRATE 50 MCG: 50 INJECTION, SOLUTION INTRAMUSCULAR; INTRAVENOUS at 23:54

## 2021-07-07 RX ADMIN — SODIUM CHLORIDE, POTASSIUM CHLORIDE, SODIUM LACTATE AND CALCIUM CHLORIDE: 600; 310; 30; 20 INJECTION, SOLUTION INTRAVENOUS at 21:45

## 2021-07-07 RX ADMIN — KETOROLAC TROMETHAMINE 30 MG: 30 INJECTION, SOLUTION INTRAMUSCULAR; INTRAVENOUS at 14:53

## 2021-07-07 RX ADMIN — MORPHINE SULFATE 4 MG: 4 INJECTION, SOLUTION INTRAMUSCULAR; INTRAVENOUS at 18:38

## 2021-07-07 RX ADMIN — ONDANSETRON 4 MG: 2 INJECTION INTRAMUSCULAR; INTRAVENOUS at 18:45

## 2021-07-07 RX ADMIN — SODIUM CHLORIDE, SODIUM GLUCONATE, SODIUM ACETATE, POTASSIUM CHLORIDE AND MAGNESIUM CHLORIDE: 526; 502; 368; 37; 30 INJECTION, SOLUTION INTRAVENOUS at 23:02

## 2021-07-07 RX ADMIN — OMEPRAZOLE 20 MG: 20 CAPSULE, DELAYED RELEASE ORAL at 06:33

## 2021-07-07 RX ADMIN — OXYCODONE 5 MG: 5 TABLET ORAL at 06:33

## 2021-07-07 RX ADMIN — DOCUSATE SODIUM 50 MG AND SENNOSIDES 8.6 MG 2 TABLET: 8.6; 5 TABLET, FILM COATED ORAL at 06:33

## 2021-07-07 RX ADMIN — FAMOTIDINE 20 MG: 10 INJECTION INTRAVENOUS at 12:00

## 2021-07-07 RX ADMIN — PROPOFOL 200 MG: 10 INJECTION, EMULSION INTRAVENOUS at 21:49

## 2021-07-07 RX ADMIN — ONDANSETRON 4 MG: 2 INJECTION INTRAMUSCULAR; INTRAVENOUS at 09:20

## 2021-07-07 RX ADMIN — INDOCYANINE GREEN AND WATER 1.3 MG: KIT at 21:20

## 2021-07-07 ASSESSMENT — PAIN DESCRIPTION - PAIN TYPE
TYPE: ACUTE PAIN

## 2021-07-07 ASSESSMENT — ENCOUNTER SYMPTOMS
NAUSEA: 1
TREMORS: 0
MYALGIAS: 0
LOSS OF CONSCIOUSNESS: 0
DIARRHEA: 0
MEMORY LOSS: 0
SORE THROAT: 0
FALLS: 0
NERVOUS/ANXIOUS: 0
PALPITATIONS: 0
CONSTIPATION: 0
PHOTOPHOBIA: 0
COUGH: 0
HEMOPTYSIS: 0
DEPRESSION: 0
DOUBLE VISION: 0
WHEEZING: 0
SPUTUM PRODUCTION: 0
BLURRED VISION: 0
POLYDIPSIA: 0
HEADACHES: 0
BLOOD IN STOOL: 0
WEIGHT LOSS: 0
VOMITING: 1
EYE REDNESS: 0
INSOMNIA: 0
BACK PAIN: 0
DIZZINESS: 0
NECK PAIN: 0
FEVER: 0
ABDOMINAL PAIN: 1
FLANK PAIN: 0
CHILLS: 0
SEIZURES: 0
PND: 0
EYE PAIN: 0
SENSORY CHANGE: 0
SPEECH CHANGE: 0
FOCAL WEAKNESS: 0
SHORTNESS OF BREATH: 0
SINUS PAIN: 0
ORTHOPNEA: 0
TINGLING: 0

## 2021-07-07 ASSESSMENT — PATIENT HEALTH QUESTIONNAIRE - PHQ9
2. FEELING DOWN, DEPRESSED, IRRITABLE, OR HOPELESS: NOT AT ALL
SUM OF ALL RESPONSES TO PHQ9 QUESTIONS 1 AND 2: 0
1. LITTLE INTEREST OR PLEASURE IN DOING THINGS: NOT AT ALL

## 2021-07-07 ASSESSMENT — LIFESTYLE VARIABLES: SUBSTANCE_ABUSE: 0

## 2021-07-07 NOTE — CARE PLAN
Problem: Pain - Standard  Goal: Alleviation of pain or a reduction in pain to the patient’s comfort goal  Outcome: Progressing     Problem: Knowledge Deficit - Standard  Goal: Patient and family/care givers will demonstrate understanding of plan of care, disease process/condition, diagnostic tests and medications  Outcome: Progressing    The patient is Stable - Low risk of patient condition declining or worsening    Shift Goals  Clinical Goals: MR abdomen pending result, pain management  Patient Goals: rest, no pain    Progress made toward(s) clinical / shift goals: pt able to report pain, managed on current regimen; pt understands POC and safety precautions    Patient is not progressing towards the following goals: n/a

## 2021-07-07 NOTE — ASSESSMENT & PLAN NOTE
She underwent lap luis fernando per Dr Garcia on 7/7. She tolerated the procedure well.   - PPI BID  - continue Carafate  - advance diet  - pain management

## 2021-07-07 NOTE — PROGRESS NOTES
Pt A&O x4. Pt reports 7/10 upper abdomen pain, medicated per MAR. Respirations even and unlabored on room air. Pt reports nausea, medicated per MAR. Updated on POC, communication board updated. Bed locked and in lowest position. Call light and belongings within reach. Non-skid socks in place. Needs met, will continue to monitor.

## 2021-07-07 NOTE — ASSESSMENT & PLAN NOTE
Ultrasound and MRCP without cholecystitis  7/7 [facility]  [facility]

## 2021-07-07 NOTE — PROGRESS NOTES
Pt admitted to room T215 from R10 at 2030.  Pt  a&o x4. Complaints of abdominal pain. 5 out of 10 pain but denies pain med needs at this time. Ambulating with steady gait.  SERNA.  On RA. Respirations equal and unlabored. Oriented to room call light and vitals frequency. Reviewed plan of care: pending MR abdomen result, diet, fall precautions, skin care, labs,and pain management with patient. Visiting hours from 8am-till 8pm for 2 visitors at a time. Admit profile done. Passed RN bedside swallow evaluation without s/sx of aspiration. All questions answered. White board updated. Verbalized understanding and agrees. Able to make needs known.

## 2021-07-07 NOTE — CONSULTS
Surgical History and Physical    Date of Service: 7/7/2021    Requesting Physician: Kenia Melara APRN - Medicine    Reason for Consultation: Episodic RUQ Pain    HPI: This is a 36 y.o. female who is presenting with recurrent, severe right upper quadrant abdominal pain that began yesterday afternoon.  There is an association with food intake.  No fevers or chills.  She had been seen previously at [facility] and determined to have gastritis.  She was treated for this, but has had recurrent pain symptoms.  Of note, she underwent a laparoscopic sleeve gastrectomy back in March of this year by Dr. Ganser.  She has been following up with Robbie Barrow at [facility] for outpatient post-op care.  She states she was told that if her pain persisted that her gallbladder needed to be removed.  Dr. Ganser had seen the patient at bedside today and deferred treatment of her gallbladder to the on-call general surgery service.  The patient states she is OK with a different surgeon removing her gallbladder.      PAST MEDICAL HISTORY:   Past Medical History:   Diagnosis Date   • Anesthesia     slow to wake up   • Arrhythmia     PVCs and PACs, has had holter in the past   • ASTHMA     in the past, no problem now   • Cold April 2017    upper respiratory infection   • Colitis    • Dysmenorrhea    • Fibromyalgia    • Heart murmur     small murmur   • Indigestion    • Narcotic abuse (HCC)    • Ovarian cyst    • PID (pelvic inflammatory disease)    • Psychiatric problem     anxiety, ADHD   • PVC's (premature ventricular contractions)    • Snoring    • Urinary bladder disorder     frequent UTI's         PAST SURGICAL HISTORY:   Past Surgical History:   Procedure Laterality Date   • PB LAP, CASTRO RESTRICT PROC, LONGITUDINAL GAS* N/A 3/15/2021    Procedure: GASTRECTOMY, SLEEVE, LAPAROSCOPIC.;  Surgeon: John H Ganser, M.D.;  Location: SURGERY [facility];  Service: General   • HYSTERECTOMY LAPAROSCOPY N/A 11/21/2019     Procedure: HYSTERECTOMY, LAPAROSCOPIC;  Surgeon: Jericho Fields M.D.;  Location: SURGERY SAME DAY [facility];  Service: Gynecology   • SALPINGECTOMY Bilateral 11/21/2019    Procedure: SALPINGECTOMY;  Surgeon: Jericho Fields M.D.;  Location: SURGERY SAME DAY [facility];  Service: Gynecology   • CYSTOSCOPY N/A 11/21/2019    Procedure: CYSTOSCOPY;  Surgeon: Jericho Fields M.D.;  Location: SURGERY SAME DAY [facility];  Service: Gynecology   • HYSTEROSCOPY THERMAL ABLATION N/A 7/10/2017    Procedure: HYSTEROSCOPY THERMAL ABLATION endometrial ;  Surgeon: Jericho Fields M.D.;  Location: SURGERY SAME DAY [facility];  Service:    • TUBAL COAGULATION LAPAROSCOPIC BILATERAL Bilateral 7/10/2017    Procedure: TUBAL COAGULATION LAPAROSCOPIC BILATERAL;  Surgeon: Jericho Fields M.D.;  Location: SURGERY SAME DAY [facility];  Service:    • DILATION AND CURETTAGE N/A 7/10/2017    Procedure: DILATION AND CURETTAGE;  Surgeon: Jericho Fields M.D.;  Location: SURGERY SAME DAY [facility];  Service:    • PRIMARY C SECTION  12/19/2010    Performed by JERICHO FIELDS at LABOR AND DELIVERY   • OTHER       T & A  age 14           ALLERGIES: Reglan [kdc:yellow dye+ci pigment blue 63+metoclopramide]       CURRENT MEDICATIONS:   Outpatient Medications Marked as Taking for the 7/6/21 encounter (Hospital Encounter)   Medication Sig   • sucralfate (CARAFATE) 1 GM Tab Take 1 g by mouth 4 times a day as needed.   • clonazePAM (KLONOPIN) 0.5 MG Tab Take 0.5 mg by mouth as needed.   • omeprazole (PRILOSEC) 20 MG delayed-release capsule Take 20 mg by mouth every day.         FAMILY HISTORY: family history includes No Known Problems in her father and mother.      SOCIAL HISTORY:  reports that she quit smoking about 14 years ago. Her smoking use included cigarettes. She has never used smokeless tobacco. She reports that she does not drink alcohol and does not use drugs.      Review of Systems:  Constitutional: Negative for fever,  "chills, weight loss, malaise/fatigue and diaphoresis.   HENT: Negative for hearing loss, ear pain, nosebleeds, congestion, sore throat, neck pain, tinnitus and ear discharge.    Eyes: Negative for blurred vision, double vision, photophobia, pain, discharge and redness.   Respiratory: Negative for cough, hemoptysis, sputum production, shortness of breath, wheezing and stridor.    Cardiovascular: Negative for chest pain, palpitations, orthopnea, claudication, leg swelling and PND.   Gastrointestinal: Negative for heartburn, nausea, vomiting, abdominal pain, diarrhea, constipation, blood in stool and melena.   Genitourinary: Negative for dysuria, urgency, frequency, hematuria and flank pain.   Musculoskeletal: Negative for myalgias, back pain, joint pain and falls.   Skin: Negative for itching and rash.  Neurological: Negative for dizziness, tingling, tremors, sensory change, speech change, focal weakness, seizures, loss of consciousness, weakness and headaches.   Endo/Heme/Allergies: Negative for environmental allergies and polydipsia. Does not bruise/bleed easily.   Psychiatric/Behavioral: Negative for depression, suicidal ideas, hallucinations, memory loss and substance abuse. The patient is not nervous/anxious and does not have insomnia.    Physical Exam:  /71   Pulse 67   Temp 36.8 °C (98.2 °F) (Temporal)   Resp 18   Ht 1.626 m (5' 4\")   Wt 84 kg (185 lb 3 oz)   SpO2 96%   Vitals:    07/07/21 0923   BP: 111/71   Pulse: 67   Resp:    Temp:    SpO2:      GENERAL:  Otherwise healthy-appearing and in no acute distress  HEENT:  Atraumatic, normocephalic.  Normal pinna bilaterally.  External auditory canals are without discharge.  Conjunctivae and sclerae are clear. Extraocular movements are full. Pupils are equal, round, and reactive to light.  Oral mucosa is moist.  NECK:  Soft and supple without lymphadenopathy. No masses are noted.  Thyroid is of normal size and texture.  Trachea is midline.  CHEST:  " Lungs are clear to auscultation bilaterally.  No masses, lesions, or signs of trauma were noted.     CARDIOVASCULAR:  Regular rate and rhythm.  No murmurs appreciated.  No JVD.  Palpable pulses present in all four extremities.   ABDOMEN:  Soft, focally tender over the right upper abdominal quadrant, non-distended.  Non-tympanitic.  No hepatomegaly or splenomegaly.  No incisional, umbilical, or inguinal hernias were appreciated.  SKIN:  Warm and well perfused. No rashes.  NEUROLOGIC:  Alert and oriented. Cranial nerves II through XII are grossly intact. Motor and sensory exams are normal in all four extremities. Motor and sensory reflexes are 2+ and symmetric with bilateral plantar responses.  PSYCHIATRIC: Affect and mood is appropriate for age and condition.    Labs:  Recent Labs     07/06/21  1600 07/07/21  0631   WBC 7.6 8.2   RBC 5.16 4.29   HEMOGLOBIN 15.7 13.4   HEMATOCRIT 46.2 39.0   MCV 89.5 90.9   MCH 30.4 31.2   MCHC 34.0 34.4   RDW 44.6 45.1   PLATELETCT 207 175   MPV 10.9 10.5     Recent Labs     07/06/21  1600 07/07/21  0631   SODIUM 140 136   POTASSIUM 4.2 3.5*   CHLORIDE 107 106   CO2 21 24   GLUCOSE 124* 102*   BUN 13 10   CREATININE 0.72 0.57   CALCIUM 9.8 8.6         Recent Labs     07/06/21  1600 07/07/21  0631   ASTSGOT 31 23   ALTSGPT 17 17   TBILIRUBIN 1.0 0.7   ALKPHOSPHAT 97 89   GLOBULIN 2.8 2.1       Radiology:  [id number]   Final Result      1.  There is cholelithiasis with no gallbladder wall thickening or pericholecystic fluid.   2.  There is no significant biliary dilatation and there is no obstructive choledocholithiasis.      US-RUQ   Final Result      1.  There is cholelithiasis.      2.  Common bile duct is mildly dilated. No common duct stone is directly visualized.          Assessment/Plan:   Symptomatic cholelithiasis- (present on admission)  Assessment & Plan  Ultrasound and MRCP without cholecystitis  7/7 Lap Emilia PENDING  [facility]    Given the above  presentation, the patient will be taken to the operating room for laparoscopic cholecystectomy. The surgical plan rationale for surgery was discussed in detail and in layman's terms with the patient and available family. Potential complications were discussed in detail and include but are not limited to infection, bleeding, damage to adjacent tissues and organs, pneumonia, anesthetic complications and death on very rare occasions. Questions and concerns were addressed to the patient's and available family's apparent satisfaction.  It was agreed to proceed.  Operative consent was obtained.  ____________________________________   Juan Garcia MD, FACS     JRU / NTS     DD: 7/7/2021   DT: 3:23 PM

## 2021-07-07 NOTE — ASSESSMENT & PLAN NOTE
Hx gastric sleeve in March 2021, follows closely with GI, recently placed on PPI, carafate for possible gastritis   - continue PPI   - op f/u with GI

## 2021-07-07 NOTE — PROGRESS NOTES
4 Eyes Skin Assessment Completed by MATY Persaud and MATY Beckwith.    Head WDL  Ears WDL  Nose WDL  Mouth WDL  Neck WDL  Breast/Chest WDL  Shoulder Blades WDL  Spine WDL  (R) Arm/Elbow/Hand WDL  (L) Arm/Elbow/Hand WDL  Abdomen WDL  Groin WDL  Scrotum/Coccyx/Buttocks WDL  (R) Leg WDL  (L) Leg WDL  (R) Heel/Foot/Toe WDL  (L) Heel/Foot/Toe WNL          Devices In Places Pulse Ox      Interventions In Place N/A    Possible Skin Injury No    Pictures Uploaded Into Epic N/A  Wound Consult Placed N/A  RN Wound Prevention Protocol Ordered No

## 2021-07-07 NOTE — H&P
Hospital Medicine History & Physical Note    Date of Service  7/6/2021    Primary Care Physician  Manny Jain D.O.    Consultants  None         Code Status  Full Code    Chief Complaint  Chief Complaint   Patient presents with   • Abdominal Pain     Right side onset approx 1430.       History of Presenting Illness  Yasemin Bradley is a 36 y.o. female who presented 7/6/2021 with severe right upper quadrant/epigastric pain.  Patient has past medical history of morbid obesity status post gastric sleeve done in March 2021 with initial weight was 220 pounds and now down to 189 pounds, history of anxiety presented with sudden onset of right upper quadrant pain early morning that started to gradually worsen after consumption of oatmeal.  Patient stated that she took 1 dose of ibuprofen 800 mg after started having epigastric pain which worsened her symptoms.  She stated that she had similar presentation but much milder symptoms 6 weeks ago where she presented to [facility].  There was concern of possible gastritis after her gastric sleeve procedure and was placed on PPI and Carafate.  Patient stated the her symptoms on this current day were much worse and.  Described the pain as severe epigastric pain that radiated to the back and associated with nausea and vomiting.  Denies any ground coffee emesis or hematemesis.  Pain was unbearable so patient presented to ER.  In ER noted to be hemodynamically stable.  Afebrile.  And lab work was grossly unremarkable.  Lipase negative.  She had right upper quadrant ultrasound that showed cholelithiasis but no evidence of acute cholecystitis.  CBD was mildly dilated at 7.8 mm.  UA showed bacteriuria but patient denies any urinary symptoms.  Started on IV fluids and pain control medications.  MRCP ordered to rule out CBD stone/obstruction.    I discussed the plan of care with ERP.    Review of Systems  Review of Systems   Constitutional: Positive for chills and weight loss  (After gastric sleeve surgery). Negative for fever.   HENT: Negative for hearing loss and tinnitus.    Eyes: Negative for blurred vision and double vision.   Respiratory: Negative for cough and hemoptysis.    Cardiovascular: Negative for chest pain and palpitations.   Gastrointestinal: Positive for abdominal pain, constipation, nausea and vomiting. Negative for blood in stool, diarrhea, heartburn and melena.   Genitourinary: Negative for dysuria, flank pain, frequency, hematuria and urgency.   Musculoskeletal: Negative for myalgias and neck pain.   Skin: Negative for rash.   Neurological: Negative for dizziness, tingling and headaches.   Psychiatric/Behavioral: Negative for depression and suicidal ideas.       Past Medical History   has a past medical history of Anesthesia, Arrhythmia, ASTHMA, Cold (April 2017), Colitis, Dysmenorrhea, Fibromyalgia, Heart murmur, Indigestion, Narcotic abuse (HCC), Ovarian cyst, PID (pelvic inflammatory disease), Psychiatric problem, PVC's (premature ventricular contractions), Snoring, and Urinary bladder disorder.    Surgical History   has a past surgical history that includes other; primary c section (12/19/2010); hysteroscopy thermal ablation (N/A, 7/10/2017); tubal coagulation laparoscopic bilateral (Bilateral, 7/10/2017); dilation and curettage (N/A, 7/10/2017); hysterectomy laparoscopy (N/A, 11/21/2019); salpingectomy (Bilateral, 11/21/2019); cystoscopy (N/A, 11/21/2019); and pr lap, pillo restrict proc, longitudinal gas* (N/A, 3/15/2021).     Family History  family history includes No Known Problems in her father and mother.   Family history reviewed with patient. There is no family history that is pertinent to the chief complaint.     Social History   reports that she quit smoking about 14 years ago. Her smoking use included cigarettes. She has never used smokeless tobacco. She reports that she does not drink alcohol and does not use drugs.    Allergies  Allergies   Allergen  Reactions   • Reglan [Kdc:Yellow Dye+Ci Pigment Blue 63+Metoclopramide]      Involuntary motor tick       Medications  Prior to Admission Medications   Prescriptions Last Dose Informant Patient Reported? Taking?   ondansetron (ZOFRAN ODT) 4 MG TABLET DISPERSIBLE FEW DAYS AGO at PRN Patient No No   Sig: Take 1 Tab by mouth every 6 hours as needed for Nausea.   sucralfate (CARAFATE) 1 GM Tab 7/6/2021 at AFTERNOON Patient Yes Yes   Sig: Take 1 g by mouth 4 times a day as needed.      Facility-Administered Medications: None       Physical Exam  Temp:  [37 °C (98.6 °F)] 37 °C (98.6 °F)  Pulse:  [69-99] 69  Resp:  [18] 18  BP: (113-133)/(65-78) 133/65  SpO2:  [94 %-98 %] 96 %    Physical Exam  Constitutional:       General: She is not in acute distress.     Appearance: She is obese. She is not ill-appearing.   HENT:      Head: Normocephalic and atraumatic.      Mouth/Throat:      Mouth: Mucous membranes are moist.   Eyes:      Extraocular Movements: Extraocular movements intact.      Pupils: Pupils are equal, round, and reactive to light.   Cardiovascular:      Rate and Rhythm: Normal rate and regular rhythm.      Heart sounds: No friction rub. No gallop.    Pulmonary:      Effort: Pulmonary effort is normal. No respiratory distress.   Abdominal:      General: There is no distension.      Palpations: Abdomen is soft.      Tenderness: There is abdominal tenderness in the right upper quadrant and epigastric area.   Musculoskeletal:         General: No swelling or tenderness.   Skin:     General: Skin is warm.   Neurological:      General: No focal deficit present.      Mental Status: She is alert and oriented to person, place, and time.   Psychiatric:         Behavior: Behavior normal.         Laboratory:  Recent Labs     07/06/21  1600   WBC 7.6   RBC 5.16   HEMOGLOBIN 15.7   HEMATOCRIT 46.2   MCV 89.5   MCH 30.4   MCHC 34.0   RDW 44.6   PLATELETCT 207   MPV 10.9     Recent Labs     07/06/21  1600   SODIUM 140   POTASSIUM  4.2   CHLORIDE 107   CO2 21   GLUCOSE 124*   BUN 13   CREATININE 0.72   CALCIUM 9.8     Recent Labs     07/06/21  1600   ALTSGPT 17   ASTSGOT 31   ALKPHOSPHAT 97   TBILIRUBIN 1.0   LIPASE 47   GLUCOSE 124*         No results for input(s): NTPROBNP in the last 72 hours.      No results for input(s): TROPONINT in the last 72 hours.    Imaging:  US-RUQ   Final Result      1.  There is cholelithiasis.      2.  Common bile duct is mildly dilated. No common duct stone is directly visualized.      [id number]    (Results Pending)       no X-Ray or EKG requiring interpretation    Assessment/Plan:  I anticipate this patient is appropriate for observation status at this time.    * Epigastric pain- (present on admission)  Assessment & Plan  Could be due to gastritis after a dose of ibuprofen 800 mg this morning.  GI cocktail x1  PPI.  Continue home dose Carafate.    Asymptomatic bacteriuria- (present on admission)  Assessment & Plan  Noted on UA.  No urinary symptoms.  Hold off on antibiotics for now.    Symptomatic cholelithiasis- (present on admission)  Assessment & Plan  Has been having right upper quadrant pain postprandially off and on over the last 6 weeks.  Right upper quadrant ultrasound showed cholelithiasis with no evidence of acute cholecystitis.  CBD mildly dilated hence we will proceed with MRCP to rule out choledocholithiasis.  Based on MRCP either patient will proceed with ERCP versus lap luis fernando.  Keep n.p.o. at midnight.  Pain control, IV fluids.      VTE prophylaxis: enoxaparin ppx

## 2021-07-07 NOTE — CARE PLAN
The patient is Stable - Low risk of patient condition declining or worsening    Shift Goals  Clinical Goals: MR abdomen pending result, pain management  Patient Goals: rest, no pain    Progress made toward(s) clinical / shift goals:    +nausea, no emesis. Medicated. Reports feeling better.    Patient is not progressing towards the following goals:  Pending MR abdomen result. Resting.     Problem: Pain - Standard  Goal: Alleviation of pain or a reduction in pain to the patient’s comfort goal  Outcome: Progressing  Note: Complaints of abdominal pain. Morphine given as ordered. Resting right now.      Problem: Knowledge Deficit - Standard  Goal: Patient and family/care givers will demonstrate understanding of plan of care, disease process/condition, diagnostic tests and medications  Outcome: Progressing  Note: Plan of care and diagnosis reviewed with patient. Verbalized understanding. Able to make needs known.

## 2021-07-07 NOTE — PROGRESS NOTES
Hospital Medicine Daily Progress Note    Date of Service  7/7/2021    Chief Complaint  Right upper quadrant abdominal pain    Hospital Course  This is a 36 y.o. female with a pmh of morbid obesity s/p gastric sleeve (3/2021) with initial weight was 220 pounds and now down to 189 pounds who presented to the ED 7/6/2021 with severe right upper quadrant/epigastric pain. The patient reports sudden onset of right upper quadrant pain after eating some oatmeal. She reports the pain became unbearable and radiated to her right shoulder and was associated with nausea and vomiting. She reports taking 1 dose of ibuprofen 800 mg, which worsened her symptoms.  She reports a similar presentation 6 weeks ago and was admitted to [facility], though her pain at that time was milder. She followed up with her gastroenterologist and was placed on PPI and Carafate, which did improve her symptoms, so this regimen was reduced to PPI only 2 weeks ago.  CBC, CMP, lipase, lactate are unremarkable. She is afebrile and remainder of labs stable. Right upper quadrant ultrasound showed cholelithiasis without evidence of acute cholecystitis. CBD was mildly dilated at 7.8 mm.  UA showed bacteriuria but patient denies any urinary symptoms. MRCP revealed cholelithiasis, but no acute findings and no significant biliary dilatation.    Interval Problem Update  7/7: patient seen and examined. She reports having just received pain medication and currently has no abdominal pain w/wo palpation. She reports her pain is related to eating and has been somewhat intermittent over the last 6 weeks. She reports compliance with her GI medications. She denies fever, chills.   Consult sent to surgery. I discussed the plan of care with patient.    PLAN:  - consult general surgery  - NPO  - pain management    Consultants/Specialty  General surgery    Code Status  Full Code    Disposition  Patient is not medically cleared.   Anticipate discharge to home once  medically stable.      Review of Systems  Review of Systems   Constitutional: Negative for chills, fever, malaise/fatigue and weight loss.   HENT: Negative for congestion, ear discharge, ear pain, hearing loss, sinus pain, sore throat and tinnitus.    Eyes: Negative for blurred vision, double vision, photophobia, pain and redness.   Respiratory: Negative for cough, hemoptysis, sputum production, shortness of breath and wheezing.    Cardiovascular: Negative for chest pain, palpitations, orthopnea, leg swelling and PND.   Gastrointestinal: Positive for abdominal pain (  right upper quadrant abdominal pain radiating to right shoulder/back), nausea and vomiting. Negative for blood in stool, constipation and diarrhea.   Genitourinary: Negative for dysuria, flank pain, frequency, hematuria and urgency.   Musculoskeletal: Negative for back pain, falls, joint pain, myalgias and neck pain.   Skin: Negative for itching and rash.   Neurological: Negative for dizziness, tingling, tremors, sensory change, speech change, focal weakness, seizures, loss of consciousness and headaches.   Endo/Heme/Allergies: Negative for polydipsia.   Psychiatric/Behavioral: Negative for depression, memory loss, substance abuse and suicidal ideas. The patient is not nervous/anxious and does not have insomnia.         Physical Exam  Temp:  [36.1 °C (97 °F)-37 °C (98.6 °F)] 36.8 °C (98.2 °F)  Pulse:  [59-99] 67  Resp:  [16-20] 18  BP: ()/(55-78) 111/71  SpO2:  [94 %-98 %] 96 %    Physical Exam  HENT:      Head: Normocephalic and atraumatic.      Mouth/Throat:      Mouth: Mucous membranes are moist.   Cardiovascular:      Rate and Rhythm: Normal rate and regular rhythm.      Heart sounds: No murmur heard.     Pulmonary:      Effort: Pulmonary effort is normal. No respiratory distress.      Breath sounds: No wheezing, rhonchi or rales.   Abdominal:      General: There is no distension.      Palpations: Abdomen is soft.      Tenderness: There is no  abdominal tenderness.   Musculoskeletal:      Comments: Moves all extremities   Skin:     General: Skin is warm and dry.   Neurological:      Mental Status: She is alert and oriented to person, place, and time. Mental status is at baseline.   Psychiatric:      Comments: Calm, cooperative during exam         Fluids    Intake/Output Summary (Last 24 hours) at 7/7/2021 1036  Last data filed at 7/6/2021 2100  Gross per 24 hour   Intake 200 ml   Output --   Net 200 ml       Laboratory  Recent Labs     07/06/21  1600 07/07/21  0631   WBC 7.6 8.2   RBC 5.16 4.29   HEMOGLOBIN 15.7 13.4   HEMATOCRIT 46.2 39.0   MCV 89.5 90.9   MCH 30.4 31.2   MCHC 34.0 34.4   RDW 44.6 45.1   PLATELETCT 207 175   MPV 10.9 10.5     Recent Labs     07/06/21  1600 07/07/21 0631   SODIUM 140 136   POTASSIUM 4.2 3.5*   CHLORIDE 107 106   CO2 21 24   GLUCOSE 124* 102*   BUN 13 10   CREATININE 0.72 0.57   CALCIUM 9.8 8.6                   Imaging  [id number]   Final Result      1.  There is cholelithiasis with no gallbladder wall thickening or pericholecystic fluid.   2.  There is no significant biliary dilatation and there is no obstructive choledocholithiasis.      US-RUQ   Final Result      1.  There is cholelithiasis.      2.  Common bile duct is mildly dilated. No common duct stone is directly visualized.           Assessment/Plan  * Right upper quadrant pain- (present on admission)  Assessment & Plan  Hx postprandial pain. Has been treated recently for suspected gastritis, which improved her pain. She did have pain after taking ibuprofen 800 mg x 1, which could suggest gastritis. MRCP is negative for acute findings.   - PPI BID  - continue Carafate  - general surgery consult    Symptomatic cholelithiasis- (present on admission)  Assessment & Plan  Intermittent RUQ postprandially pain. US showed cholelithiasis with no evidence of acute cholecystitis. CBD mildly dilated; however, MRCP also shows cholelithiasis but no acute findings and no  significant biliary dilatation.   - consult general surgery  - NPO for now  - IV fluids  - Pain control    Asymptomatic bacteriuria- (present on admission)  Assessment & Plan  UA shows bacteruria; however, patient is asymptomatic for UTI. Will not start abx.  - monitor    Status post gastric surgery  Assessment & Plan  Hx gastric sleeve in March 2021, follows closely with GI, recently placed on PPI, carafate for possible gastritis   - continue PPI   - op f/u with GI       VTE prophylaxis: SCDs/TEDs, (Lovenox on hold)    I have performed a physical exam and reviewed and updated ROS and Plan today (7/7/2021). In review of yesterday's note (7/6/2021), there are no changes except as documented above.

## 2021-07-07 NOTE — ED NOTES
Report was called by Rufino stratton RN. The ready to move button is active. Pt is headed to MRI first.

## 2021-07-08 ENCOUNTER — PATIENT OUTREACH (OUTPATIENT)
Dept: HEALTH INFORMATION MANAGEMENT | Facility: OTHER | Age: 36
End: 2021-07-08

## 2021-07-08 PROBLEM — Z90.49 STATUS POST LAPAROSCOPIC CHOLECYSTECTOMY: Status: ACTIVE | Noted: 2021-07-08

## 2021-07-08 PROBLEM — K80.20 SYMPTOMATIC CHOLELITHIASIS: Status: RESOLVED | Noted: 2021-07-06 | Resolved: 2021-07-08

## 2021-07-08 LAB — PATHOLOGY CONSULT NOTE: NORMAL

## 2021-07-08 PROCEDURE — A9270 NON-COVERED ITEM OR SERVICE: HCPCS | Performed by: FAMILY MEDICINE

## 2021-07-08 PROCEDURE — G0378 HOSPITAL OBSERVATION PER HR: HCPCS

## 2021-07-08 PROCEDURE — 96376 TX/PRO/DX INJ SAME DRUG ADON: CPT

## 2021-07-08 PROCEDURE — 700102 HCHG RX REV CODE 250 W/ 637 OVERRIDE(OP): Performed by: FAMILY MEDICINE

## 2021-07-08 PROCEDURE — A9270 NON-COVERED ITEM OR SERVICE: HCPCS | Performed by: SURGERY

## 2021-07-08 PROCEDURE — 700102 HCHG RX REV CODE 250 W/ 637 OVERRIDE(OP): Performed by: SURGERY

## 2021-07-08 PROCEDURE — 700111 HCHG RX REV CODE 636 W/ 250 OVERRIDE (IP): Performed by: INTERNAL MEDICINE

## 2021-07-08 PROCEDURE — 700111 HCHG RX REV CODE 636 W/ 250 OVERRIDE (IP): Performed by: SURGERY

## 2021-07-08 PROCEDURE — 700111 HCHG RX REV CODE 636 W/ 250 OVERRIDE (IP): Performed by: FAMILY MEDICINE

## 2021-07-08 PROCEDURE — 99225 PR SUBSEQUENT OBSERVATION CARE,LEVEL II: CPT | Performed by: NURSE PRACTITIONER

## 2021-07-08 PROCEDURE — 700111 HCHG RX REV CODE 636 W/ 250 OVERRIDE (IP): Performed by: NURSE PRACTITIONER

## 2021-07-08 PROCEDURE — 700111 HCHG RX REV CODE 636 W/ 250 OVERRIDE (IP): Performed by: ANESTHESIOLOGY

## 2021-07-08 PROCEDURE — 96375 TX/PRO/DX INJ NEW DRUG ADDON: CPT

## 2021-07-08 RX ORDER — MORPHINE SULFATE 4 MG/ML
4 INJECTION, SOLUTION INTRAMUSCULAR; INTRAVENOUS EVERY 4 HOURS PRN
Status: DISCONTINUED | OUTPATIENT
Start: 2021-07-08 | End: 2021-07-08

## 2021-07-08 RX ORDER — KETOROLAC TROMETHAMINE 30 MG/ML
30 INJECTION, SOLUTION INTRAMUSCULAR; INTRAVENOUS EVERY 6 HOURS
Status: COMPLETED | OUTPATIENT
Start: 2021-07-08 | End: 2021-07-09

## 2021-07-08 RX ORDER — MORPHINE SULFATE 4 MG/ML
2 INJECTION, SOLUTION INTRAMUSCULAR; INTRAVENOUS EVERY 4 HOURS PRN
Status: DISCONTINUED | OUTPATIENT
Start: 2021-07-08 | End: 2021-07-09 | Stop reason: HOSPADM

## 2021-07-08 RX ORDER — KETOROLAC TROMETHAMINE 30 MG/ML
30 INJECTION, SOLUTION INTRAMUSCULAR; INTRAVENOUS ONCE
Status: COMPLETED | OUTPATIENT
Start: 2021-07-08 | End: 2021-07-08

## 2021-07-08 RX ORDER — OXYCODONE HYDROCHLORIDE 10 MG/1
10 TABLET ORAL EVERY 4 HOURS PRN
Qty: 12 TABLET | Refills: 0 | Status: CANCELLED | OUTPATIENT
Start: 2021-07-08 | End: 2021-07-10

## 2021-07-08 RX ORDER — OXYCODONE HYDROCHLORIDE 10 MG/1
10 TABLET ORAL EVERY 4 HOURS PRN
Status: DISCONTINUED | OUTPATIENT
Start: 2021-07-08 | End: 2021-07-09 | Stop reason: HOSPADM

## 2021-07-08 RX ORDER — ONDANSETRON 4 MG/1
4 TABLET, ORALLY DISINTEGRATING ORAL EVERY 6 HOURS PRN
Qty: 18 TABLET | Refills: 0 | Status: CANCELLED | OUTPATIENT
Start: 2021-07-08

## 2021-07-08 RX ADMIN — OXYCODONE 5 MG: 5 TABLET ORAL at 08:46

## 2021-07-08 RX ADMIN — FENTANYL CITRATE 25 MCG: 50 INJECTION, SOLUTION INTRAMUSCULAR; INTRAVENOUS at 00:05

## 2021-07-08 RX ADMIN — ONDANSETRON 4 MG: 2 INJECTION INTRAMUSCULAR; INTRAVENOUS at 14:18

## 2021-07-08 RX ADMIN — CLONAZEPAM 0.5 MG: 0.5 TABLET ORAL at 02:56

## 2021-07-08 RX ADMIN — CLONAZEPAM 0.5 MG: 0.5 TABLET ORAL at 11:49

## 2021-07-08 RX ADMIN — MORPHINE SULFATE 4 MG: 4 INJECTION, SOLUTION INTRAMUSCULAR; INTRAVENOUS at 01:59

## 2021-07-08 RX ADMIN — DOCUSATE SODIUM 50 MG AND SENNOSIDES 8.6 MG 2 TABLET: 8.6; 5 TABLET, FILM COATED ORAL at 06:00

## 2021-07-08 RX ADMIN — MORPHINE SULFATE 4 MG: 4 INJECTION, SOLUTION INTRAMUSCULAR; INTRAVENOUS at 06:12

## 2021-07-08 RX ADMIN — MORPHINE SULFATE 4 MG: 4 INJECTION INTRAVENOUS at 11:10

## 2021-07-08 RX ADMIN — DOCUSATE SODIUM 50 MG AND SENNOSIDES 8.6 MG 2 TABLET: 8.6; 5 TABLET, FILM COATED ORAL at 17:22

## 2021-07-08 RX ADMIN — OXYCODONE HYDROCHLORIDE 10 MG: 10 TABLET ORAL at 02:55

## 2021-07-08 RX ADMIN — MORPHINE SULFATE 2 MG: 4 INJECTION INTRAVENOUS at 22:05

## 2021-07-08 RX ADMIN — OMEPRAZOLE 20 MG: 20 CAPSULE, DELAYED RELEASE ORAL at 06:11

## 2021-07-08 RX ADMIN — KETOROLAC TROMETHAMINE 30 MG: 30 INJECTION, SOLUTION INTRAMUSCULAR; INTRAVENOUS at 17:22

## 2021-07-08 RX ADMIN — KETOROLAC TROMETHAMINE 30 MG: 30 INJECTION, SOLUTION INTRAMUSCULAR; INTRAVENOUS at 11:10

## 2021-07-08 RX ADMIN — MORPHINE SULFATE 2 MG: 4 INJECTION INTRAVENOUS at 17:39

## 2021-07-08 RX ADMIN — FAMOTIDINE 20 MG: 10 INJECTION INTRAVENOUS at 06:11

## 2021-07-08 RX ADMIN — OXYCODONE HYDROCHLORIDE 15 MG: 10 TABLET ORAL at 19:57

## 2021-07-08 RX ADMIN — OXYCODONE HYDROCHLORIDE 10 MG: 10 TABLET ORAL at 14:13

## 2021-07-08 RX ADMIN — ONDANSETRON 4 MG: 4 TABLET, ORALLY DISINTEGRATING ORAL at 06:17

## 2021-07-08 RX ADMIN — OMEPRAZOLE 20 MG: 20 CAPSULE, DELAYED RELEASE ORAL at 17:22

## 2021-07-08 RX ADMIN — FAMOTIDINE 20 MG: 10 INJECTION INTRAVENOUS at 17:22

## 2021-07-08 RX ADMIN — FENTANYL CITRATE 25 MCG: 50 INJECTION, SOLUTION INTRAMUSCULAR; INTRAVENOUS at 00:27

## 2021-07-08 SDOH — ECONOMIC STABILITY: TRANSPORTATION INSECURITY
IN THE PAST 12 MONTHS, HAS LACK OF TRANSPORTATION KEPT YOU FROM MEETINGS, WORK, OR FROM GETTING THINGS NEEDED FOR DAILY LIVING?: NO

## 2021-07-08 SDOH — ECONOMIC STABILITY: FOOD INSECURITY: WITHIN THE PAST 12 MONTHS, YOU WORRIED THAT YOUR FOOD WOULD RUN OUT BEFORE YOU GOT MONEY TO BUY MORE.: NEVER TRUE

## 2021-07-08 SDOH — ECONOMIC STABILITY: TRANSPORTATION INSECURITY
IN THE PAST 12 MONTHS, HAS THE LACK OF TRANSPORTATION KEPT YOU FROM MEDICAL APPOINTMENTS OR FROM GETTING MEDICATIONS?: NO

## 2021-07-08 SDOH — ECONOMIC STABILITY: FOOD INSECURITY: WITHIN THE PAST 12 MONTHS, THE FOOD YOU BOUGHT JUST DIDN'T LAST AND YOU DIDN'T HAVE MONEY TO GET MORE.: NEVER TRUE

## 2021-07-08 SDOH — ECONOMIC STABILITY: INCOME INSECURITY: HOW HARD IS IT FOR YOU TO PAY FOR THE VERY BASICS LIKE FOOD, HOUSING, MEDICAL CARE, AND HEATING?: NOT HARD AT ALL

## 2021-07-08 ASSESSMENT — ENCOUNTER SYMPTOMS
FOCAL WEAKNESS: 0
DIZZINESS: 0
HEMOPTYSIS: 0
SENSORY CHANGE: 0
WHEEZING: 0
SINUS PAIN: 0
SHORTNESS OF BREATH: 0
PHOTOPHOBIA: 0
SPUTUM PRODUCTION: 0
MYALGIAS: 0
TINGLING: 0
BLURRED VISION: 0
HEADACHES: 0
DIARRHEA: 0
BACK PAIN: 0
POLYDIPSIA: 0
SPEECH CHANGE: 0
DEPRESSION: 0
WEIGHT LOSS: 0
INSOMNIA: 0
FLANK PAIN: 0
NERVOUS/ANXIOUS: 0
LOSS OF CONSCIOUSNESS: 0
PND: 0
SEIZURES: 0
CONSTIPATION: 0
ABDOMINAL PAIN: 1
CHILLS: 0
NECK PAIN: 0
FEVER: 0
DOUBLE VISION: 0
EYE REDNESS: 0
SORE THROAT: 0
MEMORY LOSS: 0
ORTHOPNEA: 0
EYE PAIN: 0
COUGH: 0
FALLS: 0
TREMORS: 0
BLOOD IN STOOL: 0
PALPITATIONS: 0

## 2021-07-08 ASSESSMENT — COGNITIVE AND FUNCTIONAL STATUS - GENERAL
SUGGESTED CMS G CODE MODIFIER MOBILITY: CH
MOBILITY SCORE: 24
SUGGESTED CMS G CODE MODIFIER DAILY ACTIVITY: CH
DAILY ACTIVITIY SCORE: 24

## 2021-07-08 ASSESSMENT — PAIN DESCRIPTION - PAIN TYPE
TYPE: ACUTE PAIN
TYPE: SURGICAL PAIN

## 2021-07-08 ASSESSMENT — PAIN SCALES - GENERAL: PAIN_LEVEL: 0

## 2021-07-08 ASSESSMENT — LIFESTYLE VARIABLES: SUBSTANCE_ABUSE: 0

## 2021-07-08 NOTE — ANESTHESIA TIME REPORT
Anesthesia Start and Stop Event Times     Date Time Event    7/7/2021 2139 Ready for Procedure     2145 Anesthesia Start     2325 Anesthesia Stop        Responsible Staff  07/07/21    Name Role Begin End    Jerome Kirkpatrick M.D. Anesth 2145 2325        Preop Diagnosis (Free Text):  Pre-op Diagnosis     Cholelithiasis        Preop Diagnosis (Codes):    Post op Diagnosis  Chronic cholecystitis      Premium Reason  B. 1st Call    Comments:

## 2021-07-08 NOTE — ANESTHESIA POSTPROCEDURE EVALUATION
Patient: Yasemin Bradley    Procedure Summary     Date: 07/07/21 Room / Location: [place] [id number] / SURGERY [facility]    Anesthesia Start: 2145 Anesthesia Stop: 2325    Procedure: CHOLECYSTECTOMY, LAPAROSCOPIC (Abdomen) Diagnosis: (Cholelithiasis)    Surgeons: Harper Carlisle M.D. Responsible Provider: Jerome Kirkpatrick M.D.    Anesthesia Type: general ASA Status: 2          Final Anesthesia Type: general  Last vitals  BP   Blood Pressure: 106/72    Temp   37.4 °C (99.4 °F)    Pulse   94   Resp   (!) 24    SpO2   97 %      Anesthesia Post Evaluation    Patient location during evaluation: PACU  Patient participation: complete - patient participated  Level of consciousness: awake and alert  Pain score: 0    Airway patency: patent  Anesthetic complications: no  Cardiovascular status: hemodynamically stable  Respiratory status: acceptable  Hydration status: euvolemic    PONV: none          No complications documented.     Nurse Pain Score: 0 (NPRS)

## 2021-07-08 NOTE — ANESTHESIA PREPROCEDURE EVALUATION
Hx of sleeve gastrectomy earlier this year. Denies: MI/CHF/smoking/CVA/DM/CKD      Relevant Problems   No relevant active problems       Physical Exam    Airway   Mallampati: II  TM distance: >3 FB  Neck ROM: full       Cardiovascular - normal exam  Rhythm: regular  Rate: normal  (-) murmur     Dental - normal exam           Pulmonary - normal exam  Breath sounds clear to auscultation     Abdominal    Neurological - normal exam                 Anesthesia Plan    ASA 2       Plan - general       Airway plan will be ETT          Induction: intravenous    Postoperative Plan: Postoperative administration of opioids is intended.    Pertinent diagnostic labs and testing reviewed    Informed Consent:    Anesthetic plan and risks discussed with patient.    Use of blood products discussed with: patient whom consented to blood products.

## 2021-07-08 NOTE — PROGRESS NOTES
Pt A&O x4. Pt reports 6/10 surgical pain, medicated per MAR. Respirations even and unlabored on room air. Pt denies N/V, SOB. Pt tolerating oral fluids and jello. Pt up ambulating around unit with no assistance needed. Updated on POC, communication board updated. Bed locked and in lowest position. Call light and belongings within reach. Non-skid socks in place. Needs met, will continue to monitor.

## 2021-07-08 NOTE — OP REPORT
Date of Procedure:  7/7/2021     PreOp Diagnosis:   Cholelithiasis     PostOp Diagnosis:   Chronic cholecystitis     Procedure(s):  Laparoscopic cholecystectomy    Surgeon:  Harper Carlisle M.D.     Assistant:  None     Anesthesiologist/Type of Anesthesia:  Anesthesiologist: Jerome Kirkpatrick/General     Specimen:  Gallbladder     Estimated Blood Loss:  15 cc     Findings:   -Chronically inflamed gallbladder with thick fibrinous surrounding tissue and adhesed overlying omentum  -Grossly distended cystic duct and common duct     Complications:  None    Wound Class:  Contaminated, 3     Indications:  36-year-old woman who underwent sleeve gastrectomy in March and has undergone weight loss.  She has been complaining of right upper quadrant and epigastric pain in keeping with biliary colic.  She presented with an acute exacerbation of her pain and nausea vomiting.  She wanted to undergo surgical management.  She was cared for by our acute care service and I was asked to perform the surgery on call this evening.    The non-operative and operative options were discussed with the patient in the preoperative area.  This included a discussion of a laparoscopic cholecystectomy, possible laparotomy and it's associated recovery and restrictions.  The associated risks and complications were also discussed, which include but are not limited to, bleeding, infection, injury to surrounding organs, bile leak, bile duct injury, pancreatitis or retained stone, hernia, recurrence, requirements for additional procedures or surgeries, cardiopulmonary complications, thromboembolic complications, death, etc.  The patient understood and gave consent to proceed with surgery.    Operative Procedure:  Patient was brought to the operating suite and placed in the supine position on the operating table.  Cardiopulmonary monitors were placed.  General endotracheal anesthesia was induced. Both arms were placed out to the sides on padded arm  boards.  SCDs were placed on bilateral lower extremities.  Ancef was administered.  The abdomen was prepped and draped in the usual sterile fashion.  A supraumbilical incision was made and deepened to the fascia which was grasped and elevated and incised sharply.  The peritoneal cavity was entered bluntly using a emily clamp.  0 Vicryl was placed as a figure or eight for closure at the end of the case.  The 12 mm Villa trochar was introduced and the abdomen was insufflated to 15mmHg.  There was no evidence of injury from trochar insertion.  A 5mm trochar was placed in the midline epigastrium and two 5mm ports in the RUQ.  These were all placed under direct visualization with no injury.  Prior to trochar insertion, each site was injected with 0.5% Marcaine with epinephrine. The patient was positioned in reverse trendelenburg with right side up.  The gallbladder was completely encased in omentum that had to be taken down sharply.  The gallbladder fundus was grasped and retracted cephalad.  ICG was visualized in the CBD which was noted to be grossly distended.  The anatomy on the gallbladder was difficult to discern due to the significant chronic adhesions and thick chronic surrounding fibers.  The cystic duct was noted to be grossly distended.  Diego's pouch was grasped and retracted and the peritoneum was incised.  A combination of blunt and sharp dissection was performed medially and laterally to obtain the critical view of safety.  This took quite some time due to the significant chronic adhesions and difficulty to visualize any underlying structures.  The lower part of the gallbladder was dissected away from the liver bed.  A clear critical view was obtained with only cystic duct entering the gallbladder.  The cystic artery had been divided with electrocautery during dissection.  The cystic duct was divided with a 45 VAN stapler with a white cartridge as it was grossly distended.  Prior to applying the stapler  the duct was palpated and there did not appear to be any stones.  There was no evidence of bleeding or bile leak.  The gallbladder was then taken off the liver bed using electrocautery.  A small hole was made in the gallbladder wall and there was some bile spillage that was suctioned and irrigated.  There was no evidence of stones.  The gallbladder was placed in a endocatch bag.  The liver bed was re-examined and there was no evidence of bleeding or bile leak. The right upper quadrant was irrigated and suctioned with 2 L of warm saline.  The gallbladder was removed from the umbilical port site within and intact bag.  The 12 mm trocar site in the midline epigastrium that had to be placed to allow for the stapler was closed with an 0 Vicryl and a suture passer under direct visualization.  The pneumoperitoneum was evacuated.  The skin was closed with 4-0 Monocryl and dermabond.  The counts were correct.    The patient tolerated the procedure well and was stable and extubated when brought to the PACU.    The patient will continue under the care of the acute care surgery service.

## 2021-07-08 NOTE — PROGRESS NOTES
Community Health Worker Intake  • Social determinates of health intake complete.   • Identified no barriers.  • Contact information provided to Yasemin Bradley.  • Accepted Meds-To-Beds.   • Inpatient assessment completed.    CHW met with pt bedside to introduce CHW role. Pt confirmed PCP Manny Jain D.O. Pt declined for CHW to [facility] follow up and prefers to schedule it herself. Pt denies needs for housing, food, and transportation resources. Pt will drive self to appts. Pt has a good support system with family at home. Pt reports no financial barriers. Pt reports no other needs.     Plan: Follow up hever post d/c.

## 2021-07-08 NOTE — ANESTHESIA PROCEDURE NOTES
Airway    Date/Time: 7/7/2021 9:50 PM  Performed by: Jerome Kirkpatrick M.D.  Authorized by: Jerome Kirkpatrick M.D.     Location:  OR  Urgency:  Elective  Difficult Airway: No    Indications for Airway Management:  Anesthesia      Spontaneous Ventilation: absent    Sedation Level:  Deep  Preoxygenated: Yes    Patient Position:  Sniffing  Mask Difficulty Assessment:  1 - vent by mask  Final Airway Type:  Endotracheal airway  Final Endotracheal Airway:  ETT  Cuffed: Yes    Technique Used for Successful ETT Placement:  Direct laryngoscopy    Insertion Site:  Oral  Blade Type:  Velazquez  Laryngoscope Blade/Videolaryngoscope Blade Size:  2  ETT Size (mm):  7.0  Measured from:  Lips  ETT to Lips (cm):  22  Placement Verified by: auscultation and capnometry    Cormack-Lehane Classification:  Grade I - full view of glottis  Number of Attempts at Approach:  1

## 2021-07-08 NOTE — PROGRESS NOTES
Hospital Medicine Daily Progress Note    Date of Service  7/8/2021    Chief Complaint  Right upper quadrant abdominal pain    Hospital Course  This is a 36 y.o. female with a pmh of morbid obesity s/p gastric sleeve (3/2021) with initial weight was 220 pounds and now down to 189 pounds who presented to the ED 7/6/2021 with severe right upper quadrant/epigastric pain. The patient reports sudden onset of right upper quadrant pain after eating some oatmeal. She reports the pain became unbearable and radiated to her right shoulder and was associated with nausea and vomiting. She reports taking 1 dose of ibuprofen 800 mg, which worsened her symptoms.  She reports a similar presentation 6 weeks ago and was admitted to [facility], though her pain at that time was milder. She followed up with her gastroenterologist and was placed on PPI and Carafate, which did improve her symptoms, so this regimen was reduced to PPI only 2 weeks ago. Patient has been afebrile with no leukocytosis. Labs unremarkable. RUQ ultrasound showed cholelithiasis without evidence of acute cholecystitis. CBD was mildly dilated at 7.8 mm. MRCP revealed cholelithiasis, but no acute findings and no significant biliary dilatation. She underwent lap luis fernando per Dr Garcia on 7/7. She tolerated the procedure well.     Interval Problem Update  7/8: patient seen and examined. She is alert, oriented, well-appearing. She underwent lap luis fernando per Dr Garcia last night. She has tolerated oral fluids. Reports 6/10 RUQ abdominal pain. She is ambulating on own. Cleared for discharge per surgery.    PLAN:  - advance diet as tolerated  - pain management    Consultants/Specialty  General surgery    Code Status  Full Code    Disposition  Anticipate discharge to home       Review of Systems  Review of Systems   Constitutional: Negative for chills, fever, malaise/fatigue and weight loss.   HENT: Negative for congestion, ear discharge, ear pain, hearing loss, sinus  pain, sore throat and tinnitus.    Eyes: Negative for blurred vision, double vision, photophobia, pain and redness.   Respiratory: Negative for cough, hemoptysis, sputum production, shortness of breath and wheezing.    Cardiovascular: Negative for chest pain, palpitations, orthopnea, leg swelling and PND.   Gastrointestinal: Positive for abdominal pain (  right upper quadrant abdominal pain radiating to right shoulder/back). Negative for blood in stool, constipation and diarrhea.   Genitourinary: Negative for dysuria, flank pain, frequency, hematuria and urgency.   Musculoskeletal: Negative for back pain, falls, joint pain, myalgias and neck pain.   Skin: Negative for itching and rash.   Neurological: Negative for dizziness, tingling, tremors, sensory change, speech change, focal weakness, seizures, loss of consciousness and headaches.   Endo/Heme/Allergies: Negative for polydipsia.   Psychiatric/Behavioral: Negative for depression, memory loss, substance abuse and suicidal ideas. The patient is not nervous/anxious and does not have insomnia.         Physical Exam  Temp:  [36.5 °C (97.7 °F)-37.4 °C (99.4 °F)] 36.5 °C (97.7 °F)  Pulse:  [] 95  Resp:  [12-24] 18  BP: ()/(50-84) 112/70  SpO2:  [95 %-99 %] 95 %    Physical Exam  HENT:      Head: Normocephalic and atraumatic.      Mouth/Throat:      Mouth: Mucous membranes are moist.   Cardiovascular:      Rate and Rhythm: Normal rate and regular rhythm.      Heart sounds: No murmur heard.     Pulmonary:      Effort: Pulmonary effort is normal. No respiratory distress.      Breath sounds: No wheezing, rhonchi or rales.   Abdominal:      General: There is no distension.      Palpations: Abdomen is soft.      Tenderness: There is abdominal tenderness.   Musculoskeletal:      Comments: Moves all extremities   Skin:     General: Skin is warm and dry.      Comments: Lap luis fernando puncture wounds intact   Neurological:      Mental Status: She is alert and oriented to  person, place, and time. Mental status is at baseline.   Psychiatric:      Comments: Calm, cooperative during exam         Fluids    Intake/Output Summary (Last 24 hours) at 7/8/2021 1235  Last data filed at 7/7/2021 2309  Gross per 24 hour   Intake 1250 ml   Output 30 ml   Net 1220 ml       Laboratory  Recent Labs     07/06/21  1600 07/07/21  0631   WBC 7.6 8.2   RBC 5.16 4.29   HEMOGLOBIN 15.7 13.4   HEMATOCRIT 46.2 39.0   MCV 89.5 90.9   MCH 30.4 31.2   MCHC 34.0 34.4   RDW 44.6 45.1   PLATELETCT 207 175   MPV 10.9 10.5     Recent Labs     07/06/21  1600 07/07/21  0631   SODIUM 140 136   POTASSIUM 4.2 3.5*   CHLORIDE 107 106   CO2 21 24   GLUCOSE 124* 102*   BUN 13 10   CREATININE 0.72 0.57   CALCIUM 9.8 8.6                   Imaging  [id number]   Final Result      1.  There is cholelithiasis with no gallbladder wall thickening or pericholecystic fluid.   2.  There is no significant biliary dilatation and there is no obstructive choledocholithiasis.      US-RUQ   Final Result      1.  There is cholelithiasis.      2.  Common bile duct is mildly dilated. No common duct stone is directly visualized.           Assessment/Plan  * Right upper quadrant pain- (present on admission)  Assessment & Plan   She underwent lap luis fernando per Dr Garcia on 7/7. She tolerated the procedure well.   - PPI BID  - continue Carafate  - advance diet  - pain management    Status post laparoscopic cholecystectomy  Assessment & Plan   She underwent lap luis fernando per Dr Garcia on 7/7. She tolerated the procedure well. Reports 6/10 pain, requiring IV pain medication   - pain control  - advance diet as tolerated  - encourage ambulation    Symptomatic cholelithiasis- (present on admission)  Assessment & Plan  Intermittent RUQ postprandially pain. US showed cholelithiasis with no evidence of acute cholecystitis. CBD mildly dilated; however, MRCP also shows cholelithiasis but no acute findings and no significant biliary dilatation. She underwent  mikala gould per Dr Garcia on 7/7. She tolerated the procedure well.   - advance diet  - Pain control    Asymptomatic bacteriuria- (present on admission)  Assessment & Plan  UA shows bacteruria; however, patient is asymptomatic for UTI. Will not start abx.  - monitor    Status post gastric surgery  Assessment & Plan  Hx gastric sleeve in March 2021, follows closely with GI, recently placed on PPI, carafate for possible gastritis   - continue PPI   - op f/u with GI       VTE prophylaxis: SCDs/TEDs, (Lovenox on hold)    I have performed a physical exam and reviewed and updated ROS and Plan today (7/8/2021). In review of yesterday's note (7/7/2021), there are no changes except as documented above.

## 2021-07-08 NOTE — ASSESSMENT & PLAN NOTE
She underwent lap luis fernando per Dr Garcia on 7/7. She tolerated the procedure well. Reports 6/10 pain, requiring IV pain medication   - pain control  - advance diet as tolerated  - encourage ambulation

## 2021-07-08 NOTE — CARE PLAN
Problem: Pain - Standard  Goal: Alleviation of pain or a reduction in pain to the patient’s comfort goal  Outcome: Progressing     Problem: Knowledge Deficit - Standard  Goal: Patient and family/care givers will demonstrate understanding of plan of care, disease process/condition, diagnostic tests and medications  Outcome: Progressing     The patient is Stable - Low risk of patient condition declining or worsening    Shift Goals  Clinical Goals: Pain management  Patient Goals: comfortable pain level    Progress made toward(s) clinical / shift goals:  pt able to report pain, managed with current regimen; pt understands POC and safety precautions    Patient is not progressing towards the following goals: n/a

## 2021-07-08 NOTE — PROGRESS NOTES
"    DATE: 7/8/2021    POD 1 Lap Emilia    Interval Events:  Discussed surgery  Trying to optimize post-op pain control  Discussed with Day RN  OK for discharge at any time    PHYSICAL EXAMINATION:  Vital Signs: /70   Pulse 95   Temp 36.5 °C (97.7 °F) (Temporal)   Resp 18   Ht 1.626 m (5' 4\")   Wt 84 kg (185 lb 3 oz)   SpO2 95%     Abd approprietly tender, incisions intact x4.    Laboratory Values:   Recent Labs     07/06/21  1600 07/07/21  0631   WBC 7.6 8.2   RBC 5.16 4.29   HEMOGLOBIN 15.7 13.4   HEMATOCRIT 46.2 39.0   MCV 89.5 90.9   MCH 30.4 31.2   MCHC 34.0 34.4   RDW 44.6 45.1   PLATELETCT 207 175   MPV 10.9 10.5     Recent Labs     07/06/21  1600 07/07/21  0631   SODIUM 140 136   POTASSIUM 4.2 3.5*   CHLORIDE 107 106   CO2 21 24   GLUCOSE 124* 102*   BUN 13 10   CREATININE 0.72 0.57   CALCIUM 9.8 8.6     Recent Labs     07/06/21  1600 07/07/21  0631   ASTSGOT 31 23   ALTSGPT 17 17   TBILIRUBIN 1.0 0.7   ALKPHOSPHAT 97 89   GLOBULIN 2.8 2.1            Imaging:   [id number]   Final Result      1.  There is cholelithiasis with no gallbladder wall thickening or pericholecystic fluid.   2.  There is no significant biliary dilatation and there is no obstructive choledocholithiasis.      US-RUQ   Final Result      1.  There is cholelithiasis.      2.  Common bile duct is mildly dilated. No common duct stone is directly visualized.          ASSESSMENT AND PLAN:     Symptomatic cholelithiasis- (present on admission)  Assessment & Plan  Ultrasound and MRCP without cholecystitis  7/7 Lap Emilia  [facility]         ____________________________________     Christian Garcia M.D.      "

## 2021-07-09 VITALS
DIASTOLIC BLOOD PRESSURE: 68 MMHG | TEMPERATURE: 98.3 F | OXYGEN SATURATION: 98 % | RESPIRATION RATE: 16 BRPM | BODY MASS INDEX: 31.62 KG/M2 | WEIGHT: 185.19 LBS | HEIGHT: 64 IN | HEART RATE: 72 BPM | SYSTOLIC BLOOD PRESSURE: 107 MMHG

## 2021-07-09 LAB
ALBUMIN SERPL BCP-MCNC: 3.7 G/DL (ref 3.2–4.9)
ALBUMIN/GLOB SERPL: 1.4 G/DL
ALP SERPL-CCNC: 87 U/L (ref 30–99)
ALT SERPL-CCNC: 70 U/L (ref 2–50)
ANION GAP SERPL CALC-SCNC: 7 MMOL/L (ref 7–16)
AST SERPL-CCNC: 42 U/L (ref 12–45)
BACTERIA UR CULT: NORMAL
BASOPHILS # BLD AUTO: 0.4 % (ref 0–1.8)
BASOPHILS # BLD: 0.03 K/UL (ref 0–0.12)
BILIRUB SERPL-MCNC: 0.8 MG/DL (ref 0.1–1.5)
BUN SERPL-MCNC: 5 MG/DL (ref 8–22)
CALCIUM SERPL-MCNC: 8.7 MG/DL (ref 8.5–10.5)
CHLORIDE SERPL-SCNC: 101 MMOL/L (ref 96–112)
CO2 SERPL-SCNC: 26 MMOL/L (ref 20–33)
CREAT SERPL-MCNC: 0.64 MG/DL (ref 0.5–1.4)
EOSINOPHIL # BLD AUTO: 0.24 K/UL (ref 0–0.51)
EOSINOPHIL NFR BLD: 3.2 % (ref 0–6.9)
ERYTHROCYTE [DISTWIDTH] IN BLOOD BY AUTOMATED COUNT: 46.2 FL (ref 35.9–50)
GLOBULIN SER CALC-MCNC: 2.6 G/DL (ref 1.9–3.5)
GLUCOSE SERPL-MCNC: 89 MG/DL (ref 65–99)
HCT VFR BLD AUTO: 42.4 % (ref 37–47)
HGB BLD-MCNC: 14.2 G/DL (ref 12–16)
IMM GRANULOCYTES # BLD AUTO: 0.03 K/UL (ref 0–0.11)
IMM GRANULOCYTES NFR BLD AUTO: 0.4 % (ref 0–0.9)
LYMPHOCYTES # BLD AUTO: 1.7 K/UL (ref 1–4.8)
LYMPHOCYTES NFR BLD: 22.9 % (ref 22–41)
MCH RBC QN AUTO: 30.8 PG (ref 27–33)
MCHC RBC AUTO-ENTMCNC: 33.5 G/DL (ref 33.6–35)
MCV RBC AUTO: 92 FL (ref 81.4–97.8)
MONOCYTES # BLD AUTO: 0.34 K/UL (ref 0–0.85)
MONOCYTES NFR BLD AUTO: 4.6 % (ref 0–13.4)
NEUTROPHILS # BLD AUTO: 5.09 K/UL (ref 2–7.15)
NEUTROPHILS NFR BLD: 68.5 % (ref 44–72)
NRBC # BLD AUTO: 0 K/UL
NRBC BLD-RTO: 0 /100 WBC
PLATELET # BLD AUTO: 191 K/UL (ref 164–446)
PMV BLD AUTO: 10.3 FL (ref 9–12.9)
POTASSIUM SERPL-SCNC: 3.6 MMOL/L (ref 3.6–5.5)
PROT SERPL-MCNC: 6.3 G/DL (ref 6–8.2)
RBC # BLD AUTO: 4.61 M/UL (ref 4.2–5.4)
SIGNIFICANT IND 70042: NORMAL
SITE SITE: NORMAL
SODIUM SERPL-SCNC: 134 MMOL/L (ref 135–145)
SOURCE SOURCE: NORMAL
WBC # BLD AUTO: 7.4 K/UL (ref 4.8–10.8)

## 2021-07-09 PROCEDURE — 700102 HCHG RX REV CODE 250 W/ 637 OVERRIDE(OP): Performed by: SURGERY

## 2021-07-09 PROCEDURE — 700111 HCHG RX REV CODE 636 W/ 250 OVERRIDE (IP): Performed by: FAMILY MEDICINE

## 2021-07-09 PROCEDURE — 85025 COMPLETE CBC W/AUTO DIFF WBC: CPT

## 2021-07-09 PROCEDURE — 99217 PR OBSERVATION CARE DISCHARGE: CPT | Performed by: STUDENT IN AN ORGANIZED HEALTH CARE EDUCATION/TRAINING PROGRAM

## 2021-07-09 PROCEDURE — 96376 TX/PRO/DX INJ SAME DRUG ADON: CPT

## 2021-07-09 PROCEDURE — A9270 NON-COVERED ITEM OR SERVICE: HCPCS | Performed by: SURGERY

## 2021-07-09 PROCEDURE — A9270 NON-COVERED ITEM OR SERVICE: HCPCS | Performed by: STUDENT IN AN ORGANIZED HEALTH CARE EDUCATION/TRAINING PROGRAM

## 2021-07-09 PROCEDURE — A9270 NON-COVERED ITEM OR SERVICE: HCPCS | Performed by: FAMILY MEDICINE

## 2021-07-09 PROCEDURE — 700111 HCHG RX REV CODE 636 W/ 250 OVERRIDE (IP): Performed by: INTERNAL MEDICINE

## 2021-07-09 PROCEDURE — G0378 HOSPITAL OBSERVATION PER HR: HCPCS

## 2021-07-09 PROCEDURE — 80053 COMPREHEN METABOLIC PANEL: CPT

## 2021-07-09 PROCEDURE — 700102 HCHG RX REV CODE 250 W/ 637 OVERRIDE(OP): Performed by: FAMILY MEDICINE

## 2021-07-09 PROCEDURE — 700111 HCHG RX REV CODE 636 W/ 250 OVERRIDE (IP): Performed by: SURGERY

## 2021-07-09 PROCEDURE — 36415 COLL VENOUS BLD VENIPUNCTURE: CPT

## 2021-07-09 PROCEDURE — 700111 HCHG RX REV CODE 636 W/ 250 OVERRIDE (IP): Performed by: NURSE PRACTITIONER

## 2021-07-09 PROCEDURE — 700102 HCHG RX REV CODE 250 W/ 637 OVERRIDE(OP): Performed by: STUDENT IN AN ORGANIZED HEALTH CARE EDUCATION/TRAINING PROGRAM

## 2021-07-09 RX ORDER — OXYCODONE HYDROCHLORIDE 15 MG/1
15 TABLET ORAL EVERY 6 HOURS PRN
Qty: 28 TABLET | Refills: 0 | Status: SHIPPED | OUTPATIENT
Start: 2021-07-09 | End: 2021-07-16

## 2021-07-09 RX ORDER — OXYCODONE HYDROCHLORIDE 15 MG/1
15 TABLET ORAL EVERY 6 HOURS PRN
Qty: 28 TABLET | Refills: 0 | Status: SHIPPED | OUTPATIENT
Start: 2021-07-09 | End: 2021-07-09

## 2021-07-09 RX ADMIN — KETOROLAC TROMETHAMINE 30 MG: 30 INJECTION, SOLUTION INTRAMUSCULAR; INTRAVENOUS at 05:54

## 2021-07-09 RX ADMIN — CLONAZEPAM 0.5 MG: 0.5 TABLET ORAL at 00:02

## 2021-07-09 RX ADMIN — MORPHINE SULFATE 2 MG: 4 INJECTION INTRAVENOUS at 08:30

## 2021-07-09 RX ADMIN — ONDANSETRON 4 MG: 2 INJECTION INTRAMUSCULAR; INTRAVENOUS at 01:00

## 2021-07-09 RX ADMIN — OXYCODONE HYDROCHLORIDE 15 MG: 10 TABLET ORAL at 01:00

## 2021-07-09 RX ADMIN — ONDANSETRON 4 MG: 4 TABLET, ORALLY DISINTEGRATING ORAL at 11:07

## 2021-07-09 RX ADMIN — OXYCODONE HYDROCHLORIDE 15 MG: 10 TABLET ORAL at 05:58

## 2021-07-09 RX ADMIN — KETOROLAC TROMETHAMINE 30 MG: 30 INJECTION, SOLUTION INTRAMUSCULAR; INTRAVENOUS at 00:02

## 2021-07-09 RX ADMIN — CLONAZEPAM 0.5 MG: 0.5 TABLET ORAL at 11:07

## 2021-07-09 RX ADMIN — OMEPRAZOLE 20 MG: 20 CAPSULE, DELAYED RELEASE ORAL at 05:54

## 2021-07-09 RX ADMIN — OXYCODONE HYDROCHLORIDE 15 MG: 10 TABLET ORAL at 09:51

## 2021-07-09 RX ADMIN — FAMOTIDINE 20 MG: 10 INJECTION INTRAVENOUS at 05:54

## 2021-07-09 RX ADMIN — DOCUSATE SODIUM 50 MG AND SENNOSIDES 8.6 MG 2 TABLET: 8.6; 5 TABLET, FILM COATED ORAL at 05:58

## 2021-07-09 ASSESSMENT — PAIN DESCRIPTION - PAIN TYPE
TYPE: SURGICAL PAIN
TYPE: ACUTE PAIN;SURGICAL PAIN

## 2021-07-09 NOTE — CARE PLAN
Problem: Pain - Standard  Goal: Alleviation of pain or a reduction in pain to the patient’s comfort goal  Outcome: Progressing  Note: Pain assessed per protocol q4 hours and patient medicated per MAR with PRN medication. Patient reports decrease in pain after medication administration.      Problem: Knowledge Deficit - Standard  Goal: Patient and family/care givers will demonstrate understanding of plan of care, disease process/condition, diagnostic tests and medications  Outcome: Progressing     The patient is Stable - Low risk of patient condition declining or worsening    Shift Goals  Clinical Goals: discharge   Patient Goals: Pain management    Progress made toward(s) clinical / shift goals:  patient discharged by MD this AM. Patient medicated per MAR for pain.     Patient is not progressing towards the following goals:

## 2021-07-09 NOTE — PROGRESS NOTES
Assumed care of pt at 0700. Report received by NOC RN. Pt is A&O x 4. Pain is 8/10, medicated per MAR. Pt is sitting up in bed.  Bed in lowest locked position, call light in reach, hourly rounding in place. Labs reviewed, orders reviewed, communication board updated. TM.

## 2021-07-09 NOTE — DISCHARGE INSTRUCTIONS
- Follow up with PCP in 5-7 days  - Follow up with general surgery for complications    Laparoscopic Cholecystectomy Discharge Instructions:    1. DIET: Upon discharge from the hospital you may resume your normal preoperative diet. Depending on how you are feeling and whether you have nausea or not, you may wish to stay with a bland diet for the first few days. However, you can advance this as quickly as you feel ready.    2. ACTIVITIES: You have a 10 pound weight restriction for two weeks after surgery. This means no lifting anything heavier than a gallon of milk. Routine activities such as bathing, walking, going up and down stairs, and driving* (see below) are safe.  Avoid strenuous activities and exercise that involves twisting, bending, jumping and running.    3. DRIVING: You may drive whenever you are off pain medications and are able to perform the activities needed to drive, i.e. turning, bending, twisting, wearing a seat belt, etc.    4. BATHING: You may get the wound wet at any time after leaving the hospital. You may shower, but do not submerge in a bath or a pool for at least a week.     5. BOWEL FUNCTION: A few patients, after this operation, will develop either frequent or loose stools after meals. This usually corrects itself after a few days, to a few months.    Much more common than loose stools, is constipation. The combination of pain medication and decreased activity after surgery can cause constipation in otherwise normal patients. If you feel this is occurring, take an over the counter laxative (Milk of Magnesia, Ex-Lax, Senokot, Miralax, Magnesium Citrate, etc.) until the problem has resolved.    6. PAIN MEDICATION: You will be given a prescription for pain medication at discharge. Please take these as directed. It is important to remember not to take medications on an empty stomach as this may cause nausea.  Wean the use of pain medication as tolerated as soon as possible.    7.CALL IF YOU  HAVE: (1) Fevers to more than 101F, (2) Unusual chest or leg pain, (3) Drainage or fluid from incision that may be foul smelling, increased tenderness or soreness at the wound or the wound edges are no longer together, redness or swelling at the incision site. Please do not hesitate to call with any other questions.     8. APPOINTMENT: Contact our office at [phone] for a follow-up appointment in 1 to 2 weeks following your procedure.    If you have any additional questions, please do not hesitate to call the office and speak to either myself or the physician on call.    Office address:    [address] [place], [place] [address]e [facility], [address] [place] [address]  Mitali MAJOR  [facility]  Colon and Rectal Surgery  [phone]     Discharge Instructions    Discharged to home by car with relative. Discharged via walking, hospital escort: Yes.  Special equipment needed: Not Applicable    Be sure to schedule a follow-up appointment with your primary care doctor or any specialists as instructed.     Discharge Plan:   Diet Plan: Discussed  Activity Level: Discussed  Confirmed Follow up Appointment: Appointment Scheduled  Confirmed Symptoms Management: Discussed  Medication Reconciliation Updated: Yes    I understand that a diet low in cholesterol, fat, and sodium is recommended for good health. Unless I have been given specific instructions below for another diet, I accept this instruction as my diet prescription.   Other diet: n/a    Special Instructions: None    · Is patient discharged on Warfarin / Coumadin?   No     Depression / Suicide Risk    As you are discharged from this [facility] facility, it is important to learn how to keep safe from harming yourself.    Recognize the warning signs:  · Abrupt changes in personality, positive or negative- including increase in energy   · Giving away possessions  · Change in eating patterns- significant weight changes-  positive or negative  · Change in sleeping patterns- unable  to sleep or sleeping all the time   · Unwillingness or inability to communicate  · Depression  · Unusual sadness, discouragement and loneliness  · Talk of wanting to die  · Neglect of personal appearance   · Rebelliousness- reckless behavior  · Withdrawal from people/activities they love  · Confusion- inability to concentrate     If you or a loved one observes any of these behaviors or has concerns about self-harm, here's what you can do:  · Talk about it- your feelings and reasons for harming yourself  · Remove any means that you might use to hurt yourself (examples: pills, rope, extension cords, firearm)  · Get professional help from the community (Mental Health, Substance Abuse, psychological counseling)  · Do not be alone:Call your Safe Contact- someone whom you trust who will be there for you.  · Call your local CRISIS HOTLINE 218-1215 or 498-881-1983  · Call your local Children's Mobile Crisis Response Team [place] (749) 812-6710 or www.[place]  · Call the toll free National Suicide Prevention Hotlines   · National Suicide Prevention Lifeline 701-412-SBBO (9723)  · National Hope Line Network 800-SUICIDE (036-6657)      Laparoscopic Cholecystectomy  Laparoscopic cholecystectomy is surgery to remove the gallbladder. The gallbladder is a pear-shaped organ that lies beneath the liver on the right side of the body. The gallbladder stores bile, which is a fluid that helps the body to digest fats. Cholecystectomy is often done for inflammation of the gallbladder (cholecystitis). This condition is usually caused by a buildup of gallstones (cholelithiasis) in the gallbladder. Gallstones can block the flow of bile, which can result in inflammation and pain. In severe cases, emergency surgery may be required.  This procedure is done though small incisions in your abdomen (laparoscopic surgery). A thin scope with a camera (laparoscope) is inserted through one incision. Thin surgical instruments are inserted  through the other incisions. In some cases, a laparoscopic procedure may be turned into a type of surgery that is done through a larger incision (open surgery).  Tell a health care provider about:  Any allergies you have.  All medicines you are taking, including vitamins, herbs, eye drops, creams, and over-the-counter medicines.  Any problems you or family members have had with anesthetic medicines.  Any blood disorders you have.  Any surgeries you have had.  Any medical conditions you have.  Whether you are pregnant or may be pregnant.  What are the risks?  Generally, this is a safe procedure. However, problems may occur, including:  Infection.  Bleeding.  Allergic reactions to medicines.  Damage to other structures or organs.  A stone remaining in the common bile duct. The common bile duct carries bile from the gallbladder into the small intestine.  A bile leak from the cyst duct that is clipped when your gallbladder is removed.  What happens before the procedure?  Staying hydrated  Follow instructions from your health care provider about hydration, which may include:  Up to 2 hours before the procedure - you may continue to drink clear liquids, such as water, clear fruit juice, black coffee, and plain tea.  Eating and drinking restrictions  Follow instructions from your health care provider about eating and drinking, which may include:  8 hours before the procedure - stop eating heavy meals or foods such as meat, fried foods, or fatty foods.  6 hours before the procedure - stop eating light meals or foods, such as toast or cereal.  6 hours before the procedure - stop drinking milk or drinks that contain milk.  2 hours before the procedure - stop drinking clear liquids.  Medicines  Ask your health care provider about:  Changing or stopping your regular medicines. This is especially important if you are taking diabetes medicines or blood thinners.  Taking medicines such as aspirin and ibuprofen. These medicines can  thin your blood. Do not take these medicines before your procedure if your health care provider instructs you not to.  You may be given antibiotic medicine to help prevent infection.  General instructions  Let your health care provider know if you develop a cold or an infection before surgery.  Plan to have someone take you home from the hospital or clinic.  Ask your health care provider how your surgical site will be marked or identified.  What happens during the procedure?    To reduce your risk of infection:  Your health care team will wash or sanitize their hands.  Your skin will be washed with soap.  Hair may be removed from the surgical area.  An IV tube may be inserted into one of your veins.  You will be given one or more of the following:  A medicine to help you relax (sedative).  A medicine to make you fall asleep (general anesthetic).  A breathing tube will be placed in your mouth.  Your surgeon will make several small cuts (incisions) in your abdomen.  The laparoscope will be inserted through one of the small incisions. The camera on the laparoscope will send images to a TV screen (monitor) in the operating room. This lets your surgeon see inside your abdomen.  Air-like gas will be pumped into your abdomen. This will expand your abdomen to give the surgeon more room to perform the surgery.  Other tools that are needed for the procedure will be inserted through the other incisions. The gallbladder will be removed through one of the incisions.  Your common bile duct may be examined. If stones are found in the common bile duct, they may be removed.  After your gallbladder has been removed, the incisions will be closed with stitches (sutures), staples, or skin glue.  Your incisions may be covered with a bandage (dressing).  The procedure may vary among health care providers and hospitals.  What happens after the procedure?  Your blood pressure, heart rate, breathing rate, and blood oxygen level will be  monitored until the medicines you were given have worn off.  You will be given medicines as needed to control your pain.  Do not drive for 24 hours if you were given a sedative.  This information is not intended to replace advice given to you by your health care provider. Make sure you discuss any questions you have with your health care provider.  Document Released: 12/18/2006 Document Revised: 11/30/2018 Document Reviewed: 06/05/2017  Elsevier Patient Education © 2020 [employer]

## 2021-07-09 NOTE — DISCHARGE SUMMARY
Discharge Summary    CHIEF COMPLAINT ON ADMISSION  Chief Complaint   Patient presents with   • Abdominal Pain     Right side onset approx 1430.       Reason for Admission  EMS     Admission Date  7/6/2021    CODE STATUS  Prior    HPI & HOSPITAL COURSE  This is a 36 y.o. female here with abdominal pain. Patient with history of obesity s/p gastric sleeve 3/2021, who presented with abdominal pain. Patient was found to have biliary colic, with dilated common bile duct but no intraductal stones seen on MRCP. Patient underwent laparascopic cholecystectomy by surgery team, feeling well post operatively. Patient passing gas, tolerating liquids well. She will be discharged with pain medication, to follow up with surgical team in next 1-2 weeks.    Therefore, she is discharged in good and stable condition to home with close outpatient follow-up.      Discharge Date  7/9/2021    FOLLOW UP ITEMS POST DISCHARGE  Take medications as prescribed.  Follow up with surgery team.    DISCHARGE DIAGNOSES  Principal Problem:    Right upper quadrant pain POA: Yes  Active Problems:    Asymptomatic bacteriuria POA: Yes    Status post gastric surgery POA: Unknown    Status post laparoscopic cholecystectomy POA: Unknown  Resolved Problems:    Symptomatic cholelithiasis POA: Yes      FOLLOW UP  No future appointments.  Manny aJin D.O.  [address] [facility] [id number] [phone]    Schedule an appointment as soon as possible for a visit  Follow up in 5-7 days    Harper Carlisle M.D.  [address] [id number] [phone]    In 2 weeks  As needed, For wound re-check please call office [phone] for 2 week follow up appointment      MEDICATIONS ON DISCHARGE     Medication List      START taking these medications      Instructions   oxycodone 15 MG immediate release tablet  Commonly known as: OXY-IR   Take 1 tablet by mouth every 6 hours as needed for Severe Pain for up to 7 days.  Dose: 15 mg        CONTINUE  taking these medications      Instructions   clonazePAM 0.5 MG Tabs  Commonly known as: KLONOPIN   Take 0.5 mg by mouth as needed.  Dose: 0.5 mg     omeprazole 20 MG delayed-release capsule  Commonly known as: PRILOSEC   Take 20 mg by mouth every day.  Dose: 20 mg     ondansetron 4 MG Tbdp  Commonly known as: Zofran ODT   Take 1 Tab by mouth every 6 hours as needed for Nausea.  Dose: 4 mg     sucralfate 1 GM Tabs  Commonly known as: CARAFATE   Take 1 g by mouth 4 times a day as needed.  Dose: 1 g            Allergies  Allergies   Allergen Reactions   • Reglan [Kdc:Yellow Dye+Ci Pigment Blue 63+Metoclopramide]      Involuntary motor tick       DIET  No orders of the defined types were placed in this encounter.      ACTIVITY  As tolerated.  Weight bearing as tolerated    CONSULTATIONS  General surgery    PROCEDURES  Lap cholecystectomy 7/7    LABORATORY  Lab Results   Component Value Date    SODIUM 134 (L) 07/09/2021    POTASSIUM 3.6 07/09/2021    CHLORIDE 101 07/09/2021    CO2 26 07/09/2021    GLUCOSE 89 07/09/2021    BUN 5 (L) 07/09/2021    CREATININE 0.64 07/09/2021    CREATININE 0.8 05/05/2009        Lab Results   Component Value Date    WBC 7.4 07/09/2021    HEMOGLOBIN 14.2 07/09/2021    HEMATOCRIT 42.4 07/09/2021    PLATELETCT 191 07/09/2021        Total time of the discharge process exceeds 34 minutes.

## 2021-07-09 NOTE — PROGRESS NOTES
"Rec'd report from day shift RN. Assumed pt care. Assessment completed. AA&OX4. C/O constant pain, medicated per MAR with oxycodone. Pt is drinking peppermint tea, was able to eat sugar free jello. Pt walked around the unit multiple times in order to relieve \"the gas pain in abdomen\". Pt is up self and maintains steady gait. Also witnessed pt using incentive spirometer correctly, used about 3x and encouraged pt to use about every 30mins -1hour. Bed in lowest position, bed locked, treaded socks in place, RN and CNA numbers provided, call light within reach.  "

## 2021-07-09 NOTE — PROGRESS NOTES
"    DATE: 7/9/2021    POD 2 Lap Emilia    Interval Events:  Pain better controlled today  Ambulating      PHYSICAL EXAMINATION:  Vital Signs: /68   Pulse 72   Temp 36.8 °C (98.3 °F) (Temporal)   Resp 16   Ht 1.626 m (5' 4\")   Wt 84 kg (185 lb 3 oz)   SpO2 98%     Abd approprietly tender, incisions intact x4.    Laboratory Values:   Recent Labs     07/06/21  1600 07/07/21 0631 07/09/21  0815   WBC 7.6 8.2 7.4   RBC 5.16 4.29 4.61   HEMOGLOBIN 15.7 13.4 14.2   HEMATOCRIT 46.2 39.0 42.4   MCV 89.5 90.9 92.0   MCH 30.4 31.2 30.8   MCHC 34.0 34.4 33.5*   RDW 44.6 45.1 46.2   PLATELETCT 207 175 191   MPV 10.9 10.5 10.3     Recent Labs     07/06/21 1600 07/07/21 0631 07/09/21  0815   SODIUM 140 136 134*   POTASSIUM 4.2 3.5* 3.6   CHLORIDE 107 106 101   CO2 21 24 26   GLUCOSE 124* 102* 89   BUN 13 10 5*   CREATININE 0.72 0.57 0.64   CALCIUM 9.8 8.6 8.7     Recent Labs     07/06/21 1600 07/07/21 0631 07/09/21  0815   ASTSGOT 31 23 42   ALTSGPT 17 17 70*   TBILIRUBIN 1.0 0.7 0.8   ALKPHOSPHAT 97 89 87   GLOBULIN 2.8 2.1 2.6            Imaging:   [id number]   Final Result      1.  There is cholelithiasis with no gallbladder wall thickening or pericholecystic fluid.   2.  There is no significant biliary dilatation and there is no obstructive choledocholithiasis.      US-RUQ   Final Result      1.  There is cholelithiasis.      2.  Common bile duct is mildly dilated. No common duct stone is directly visualized.          ASSESSMENT AND PLAN:     POD 2 S/P lap cholecystectomy  OK for DC home today  2 week follow up with Dr. Carlisle arranged  DC instructions provided        No new Assessment & Plan notes have been filed under this hospital service since the last note was generated.  Service: Surgery General         ____________________________________     VENANCIO Bermudez.      "

## 2021-07-09 NOTE — PROGRESS NOTES
"Pt steps out of room every 2 hours holding stomach, hunched over, time for pain medication. Ask pt to sit on bed for safety as walking hunching over is unsafe, can use call light.   When medicating pt per MAR at 0000, pt stated \"I don't know this pain is worse then when I got my gastric sleeve. Everything in my abdomen is burning. Something is wrong. Tomorrow I want to call my surgeon, they said that there is a very small possibility that a gall stone could get stuck in the bile duct and cause inflammation.\"  Pt is tolerating diet, no nausea, has been able to nap from [id number] without outward signs of pain noted. Pt told this RN that simethicone was taken earlier in the afternoon.      "

## 2021-07-09 NOTE — CARE PLAN
The patient is Stable - Low risk of patient condition declining or worsening    Shift Goals  Clinical Goals: pain management  Patient Goals: pain management    Progress made toward(s) clinical / shift goals:      Patient is not progressing towards the following goals: Medicated per MAR for pain continuously. Pt reports no pain relief from all the meds.

## 2021-07-09 NOTE — PROGRESS NOTES
Pt on call light demanding her surgeon to be paged for uncontrolled pain. Pt states that no one told her that her IV morphine was going to be discontinued and that IV morphine is the only thing that helps, pt not agreeable to taking oral pain medications at this time. Pt states that no one is helping her. Kenia MAJOR notified, new orders received.

## 2021-07-09 NOTE — PROGRESS NOTES
Pt off unit to S5 with transport via wheelchair. Pt states all belongings in possession, chart sent with pt

## 2021-07-09 NOTE — PROGRESS NOTES
Pt requesting next dose of oxy 15mg and zofran. Medicated per MAR. Pt also requested chicken broth and crackers. Provided pt with chicken broth, no crackers reminded pt that she was just medicated for nausea and broth was ok in small amount.

## 2021-07-12 ENCOUNTER — PATIENT OUTREACH (OUTPATIENT)
Dept: HEALTH INFORMATION MANAGEMENT | Facility: OTHER | Age: 36
End: 2021-07-12

## 2021-07-12 NOTE — PROGRESS NOTES
Community Health Worker Intake  • Contact information provided to Yasemin Bradley.  • Outpatient assessment completed.  • Did the patient receive medications post discharge: Yes    CHW spoke with Yasemin to follow up post d/c. CHW reviewed and discussed AVS with Yasemin. No follow up with PCP. Yasemin states she will follow up with PCP when she needs to. She states she has a follow up with [facility] next week. Yasemin reports no other needs from CHW. Encouraged Yasemin to reach out to CHW when needed. Yasemin will be d/c from CCM services. All needs met.

## 2021-10-14 ENCOUNTER — APPOINTMENT (OUTPATIENT)
Dept: RADIOLOGY | Facility: MEDICAL CENTER | Age: 36
End: 2021-10-14
Attending: EMERGENCY MEDICINE
Payer: MEDICAID

## 2021-10-14 ENCOUNTER — HOSPITAL ENCOUNTER (EMERGENCY)
Facility: MEDICAL CENTER | Age: 36
End: 2021-10-14
Attending: EMERGENCY MEDICINE
Payer: MEDICAID

## 2021-10-14 VITALS
HEIGHT: 64 IN | SYSTOLIC BLOOD PRESSURE: 100 MMHG | RESPIRATION RATE: 18 BRPM | DIASTOLIC BLOOD PRESSURE: 63 MMHG | OXYGEN SATURATION: 99 % | HEART RATE: 61 BPM | BODY MASS INDEX: 31.62 KG/M2 | TEMPERATURE: 97.5 F | WEIGHT: 185.19 LBS

## 2021-10-14 DIAGNOSIS — R00.1 BRADYCARDIA: ICD-10-CM

## 2021-10-14 DIAGNOSIS — R53.1 WEAKNESS: ICD-10-CM

## 2021-10-14 LAB
ALBUMIN SERPL BCP-MCNC: 3.9 G/DL (ref 3.2–4.9)
ALBUMIN/GLOB SERPL: 1.6 G/DL
ALP SERPL-CCNC: 54 U/L (ref 30–99)
ALT SERPL-CCNC: 9 U/L (ref 2–50)
ANION GAP SERPL CALC-SCNC: 11 MMOL/L (ref 7–16)
AST SERPL-CCNC: 14 U/L (ref 12–45)
BASOPHILS # BLD AUTO: 0.7 % (ref 0–1.8)
BASOPHILS # BLD: 0.05 K/UL (ref 0–0.12)
BILIRUB SERPL-MCNC: 0.5 MG/DL (ref 0.1–1.5)
BUN SERPL-MCNC: 12 MG/DL (ref 8–22)
CALCIUM SERPL-MCNC: 9.3 MG/DL (ref 8.4–10.2)
CHLORIDE SERPL-SCNC: 105 MMOL/L (ref 96–112)
CO2 SERPL-SCNC: 23 MMOL/L (ref 20–33)
CREAT SERPL-MCNC: 0.65 MG/DL (ref 0.5–1.4)
EKG IMPRESSION: NORMAL
EOSINOPHIL # BLD AUTO: 0.02 K/UL (ref 0–0.51)
EOSINOPHIL NFR BLD: 0.3 % (ref 0–6.9)
ERYTHROCYTE [DISTWIDTH] IN BLOOD BY AUTOMATED COUNT: 43.8 FL (ref 35.9–50)
GLOBULIN SER CALC-MCNC: 2.4 G/DL (ref 1.9–3.5)
GLUCOSE SERPL-MCNC: 89 MG/DL (ref 65–99)
HCT VFR BLD AUTO: 43.2 % (ref 37–47)
HGB BLD-MCNC: 14.2 G/DL (ref 12–16)
IMM GRANULOCYTES # BLD AUTO: 0.02 K/UL (ref 0–0.11)
IMM GRANULOCYTES NFR BLD AUTO: 0.3 % (ref 0–0.9)
LYMPHOCYTES # BLD AUTO: 1.67 K/UL (ref 1–4.8)
LYMPHOCYTES NFR BLD: 23.2 % (ref 22–41)
MCH RBC QN AUTO: 30.7 PG (ref 27–33)
MCHC RBC AUTO-ENTMCNC: 32.9 G/DL (ref 33.6–35)
MCV RBC AUTO: 93.3 FL (ref 81.4–97.8)
MONOCYTES # BLD AUTO: 0.35 K/UL (ref 0–0.85)
MONOCYTES NFR BLD AUTO: 4.9 % (ref 0–13.4)
NEUTROPHILS # BLD AUTO: 5.1 K/UL (ref 2–7.15)
NEUTROPHILS NFR BLD: 70.6 % (ref 44–72)
NRBC # BLD AUTO: 0 K/UL
NRBC BLD-RTO: 0 /100 WBC
PLATELET # BLD AUTO: 224 K/UL (ref 164–446)
PMV BLD AUTO: 10 FL (ref 9–12.9)
POTASSIUM SERPL-SCNC: 4 MMOL/L (ref 3.6–5.5)
PROT SERPL-MCNC: 6.3 G/DL (ref 6–8.2)
RBC # BLD AUTO: 4.63 M/UL (ref 4.2–5.4)
SODIUM SERPL-SCNC: 139 MMOL/L (ref 135–145)
TROPONIN T SERPL-MCNC: <6 NG/L (ref 6–19)
TSH SERPL DL<=0.005 MIU/L-ACNC: 0.55 UIU/ML (ref 0.38–5.33)
WBC # BLD AUTO: 7.2 K/UL (ref 4.8–10.8)

## 2021-10-14 PROCEDURE — 700105 HCHG RX REV CODE 258: Performed by: EMERGENCY MEDICINE

## 2021-10-14 PROCEDURE — 99284 EMERGENCY DEPT VISIT MOD MDM: CPT

## 2021-10-14 PROCEDURE — 85025 COMPLETE CBC W/AUTO DIFF WBC: CPT

## 2021-10-14 PROCEDURE — 84443 ASSAY THYROID STIM HORMONE: CPT

## 2021-10-14 PROCEDURE — 93005 ELECTROCARDIOGRAM TRACING: CPT | Performed by: EMERGENCY MEDICINE

## 2021-10-14 PROCEDURE — 36415 COLL VENOUS BLD VENIPUNCTURE: CPT

## 2021-10-14 PROCEDURE — 84484 ASSAY OF TROPONIN QUANT: CPT

## 2021-10-14 PROCEDURE — 71045 X-RAY EXAM CHEST 1 VIEW: CPT

## 2021-10-14 PROCEDURE — 80053 COMPREHEN METABOLIC PANEL: CPT

## 2021-10-14 RX ORDER — ACETAMINOPHEN 500 MG
1000 TABLET ORAL 3 TIMES DAILY
Status: SHIPPED | COMMUNITY
End: 2023-10-09

## 2021-10-14 RX ORDER — SODIUM CHLORIDE, SODIUM LACTATE, POTASSIUM CHLORIDE, CALCIUM CHLORIDE 600; 310; 30; 20 MG/100ML; MG/100ML; MG/100ML; MG/100ML
1000 INJECTION, SOLUTION INTRAVENOUS ONCE
Status: COMPLETED | OUTPATIENT
Start: 2021-10-14 | End: 2021-10-14

## 2021-10-14 RX ADMIN — SODIUM CHLORIDE, POTASSIUM CHLORIDE, SODIUM LACTATE AND CALCIUM CHLORIDE 1000 ML: 600; 310; 30; 20 INJECTION, SOLUTION INTRAVENOUS at 11:32

## 2021-10-14 ASSESSMENT — FIBROSIS 4 INDEX: FIB4 SCORE: 0.89

## 2021-10-14 NOTE — ED PROVIDER NOTES
ED Provider Note    CHIEF COMPLAINT  Chief Complaint   Patient presents with   • Sent from Urgent Care     EKG done, showing Sinus Onofre   • Weakness     x2 days, c/o dizziness, lightheaded, tired, unable to stand for long periods of time w/o feeling faint        HPI  Yasemin Bradley is a 36 y.o. female who presents to the ED with complaints of lightheadedness dizziness.  Patient states for the past couple days she has been having episodes when she stands up she feels like she is going to pass out.  She has been feeling very weak and tired.  Patient normally has heart rates in the 40s to 50s range.  She does have an apple watch and last night it was actually ringing on her that she was in the 30s range.  Patient states that she has noted that her heart rate was in the 30s in the past.  She went to an urgent care today and was referred to the emergency department for further evaluation care.  Upon arrival here she just feels very weak like all of her energy is gone.  She denies any chest pain shortness of breath fevers chills nausea vomiting denies any episodes of passing out.    REVIEW OF SYSTEMS  See HPI for further details. All other systems are negative.     PAST MEDICAL HISTORY  Past Medical History:   Diagnosis Date   • Anesthesia     slow to wake up   • Arrhythmia     PVCs and PACs, has had holter in the past   • ASTHMA     in the past, no problem now   • Cold April 2017    upper respiratory infection   • Colitis    • Dysmenorrhea    • Fibromyalgia    • Heart murmur     small murmur   • Indigestion    • Narcotic abuse (HCC)    • Ovarian cyst    • PID (pelvic inflammatory disease)    • Psychiatric problem     anxiety, ADHD   • PVC's (premature ventricular contractions)    • Snoring    • Urinary bladder disorder     frequent UTI's       FAMILY HISTORY  Family History   Problem Relation Age of Onset   • No Known Problems Mother    • No Known Problems Father        SOCIAL HISTORY  Social History      Socioeconomic History   • Marital status: Single     Spouse name: Not on file   • Number of children: Not on file   • Years of education: Not on file   • Highest education level: Not on file   Occupational History   • Not on file   Tobacco Use   • Smoking status: Former Smoker     Types: Cigarettes     Quit date: 2007     Years since quittin.7   • Smokeless tobacco: Never Used   Vaping Use   • Vaping Use: Never used   Substance and Sexual Activity   • Alcohol use: No   • Drug use: No     Types: Oral     Comment: hx of opiate addiction, clean since 14   • Sexual activity: Not on file   Other Topics Concern   • Not on file   Social History Narrative   • Not on file     Social Determinants of Health     Financial Resource Strain: Low Risk    • Difficulty of Paying Living Expenses: Not hard at all   Food Insecurity: No Food Insecurity   • Worried About Running Out of Food in the Last Year: Never true   • Ran Out of Food in the Last Year: Never true   Transportation Needs: No Transportation Needs   • Lack of Transportation (Medical): No   • Lack of Transportation (Non-Medical): No   Physical Activity:    • Days of Exercise per Week:    • Minutes of Exercise per Session:    Stress:    • Feeling of Stress :    Social Connections:    • Frequency of Communication with Friends and Family:    • Frequency of Social Gatherings with Friends and Family:    • Attends Shinto Services:    • Active Member of Clubs or Organizations:    • Attends Club or Organization Meetings:    • Marital Status:    Intimate Partner Violence:    • Fear of Current or Ex-Partner:    • Emotionally Abused:    • Physically Abused:    • Sexually Abused:       Manny Jain D.O.      SURGICAL HISTORY  Past Surgical History:   Procedure Laterality Date   • PEDRITO BY LAPAROSCOPY  2021    Procedure: CHOLECYSTECTOMY, LAPAROSCOPIC;  Surgeon: Harper Carlisle M.D.;  Location: SURGERY [facility];  Service: Gen Robotic   • PB LAP, CASTRO  "RESTRICT PROC, LONGITUDINAL GAS* N/A 3/15/2021    Procedure: GASTRECTOMY, SLEEVE, LAPAROSCOPIC.;  Surgeon: John H Ganser, M.D.;  Location: SURGERY [facility];  Service: General   • HYSTERECTOMY LAPAROSCOPY N/A 11/21/2019    Procedure: HYSTERECTOMY, LAPAROSCOPIC;  Surgeon: Jericho Fields M.D.;  Location: SURGERY SAME DAY [facility];  Service: Gynecology   • SALPINGECTOMY Bilateral 11/21/2019    Procedure: SALPINGECTOMY;  Surgeon: Jericho Fields M.D.;  Location: SURGERY SAME DAY [facility];  Service: Gynecology   • CYSTOSCOPY N/A 11/21/2019    Procedure: CYSTOSCOPY;  Surgeon: Jericho Fields M.D.;  Location: SURGERY SAME DAY [facility];  Service: Gynecology   • HYSTEROSCOPY THERMAL ABLATION N/A 7/10/2017    Procedure: HYSTEROSCOPY THERMAL ABLATION endometrial ;  Surgeon: Jericho Fields M.D.;  Location: SURGERY SAME DAY [facility];  Service:    • TUBAL COAGULATION LAPAROSCOPIC BILATERAL Bilateral 7/10/2017    Procedure: TUBAL COAGULATION LAPAROSCOPIC BILATERAL;  Surgeon: Jericho Fields M.D.;  Location: SURGERY SAME DAY [facility];  Service:    • DILATION AND CURETTAGE N/A 7/10/2017    Procedure: DILATION AND CURETTAGE;  Surgeon: Jericho Fields M.D.;  Location: SURGERY SAME DAY [facility];  Service:    • PRIMARY C SECTION  12/19/2010    Performed by JERICHO FIELDS at LABOR AND DELIVERY   • OTHER       T & A  age 14        CURRENT MEDICATIONS  Home Medications     Reviewed by Kevin Bernal (Pharmacy Tech) on 10/14/21 at 1113  Med List Status: Complete   Medication Last Dose Status   5-Hydroxytryptophan (5-HTP PO) 10/14/2021 Active   acetaminophen (TYLENOL) 500 MG Tab 10/13/2021 Active                ALLERGIES  Allergies   Allergen Reactions   • Kdc:Yellow Dye+Ci Pigment Blue 63+Metoclopramide      Involuntary motor tick       PHYSICAL EXAM  VITAL SIGNS: BP (!) 89/51   Pulse (!) 47   Temp 36.4 °C (97.5 °F) (Temporal)   Resp 16   Ht 1.626 m (5' 4\")   Wt 84 kg (185 lb 3 oz)   LMP " 11/20/2019   SpO2 98%   BMI 31.79 kg/m²    Pulse Oximetry was obtained. It showed a reading of Pulse Oximetry: 98 %.  I interpreted this as nonhypoxic.     Constitutional: Well developed, Well nourished, No acute distress, Non-toxic appearance.   HENT: Normocephalic, Atraumatic, Bilateral external ears normal, bilateral tympanic membranes normal, Oropharynx moist mucous membranes, No oral exudates, Nose normal.   Eyes:  conjunctiva is normal, there are no signs of exudate.   Neck: Supple, no cervical lymphadenopathy, no meningeal signs..   Lymphatic: No lymphadenopathy noted.   Cardiovascular: Regular rate and rhythm without murmurs gallops or rubs.   Thorax & Lungs: Lungs are clear to auscultation bilaterally, there are no wheezes no rales. Chest wall is nontender.  Abdomen: Soft, nontender nondistended. Bowel sounds are present.   Skin: Warm, Dry, No erythema,   Back: No tenderness, No CVA tenderness.  Musculoskeletal: Good range of motion in all major joints. No tenderness to palpation or major deformities noted. Intact distal pulses, no clubbing, no cyanosis, no edema     Neurologic: Alert & oriented x 3, Normal motor function, Normal sensory function, No focal deficits noted.   Psychiatric: Affect normal, Judgment normal, Mood normal.     EKG  Interpreted below by myself    RADIOLOGY/PROCEDURES  DX-CHEST-PORTABLE (1 VIEW)   Final Result      Mild interval cardiac silhouette enlargement without consolidation identified          Results for orders placed or performed during the hospital encounter of 10/14/21   CBC with Differential   Result Value Ref Range    WBC 7.2 4.8 - 10.8 K/uL    RBC 4.63 4.20 - 5.40 M/uL    Hemoglobin 14.2 12.0 - 16.0 g/dL    Hematocrit 43.2 37.0 - 47.0 %    MCV 93.3 81.4 - 97.8 fL    MCH 30.7 27.0 - 33.0 pg    MCHC 32.9 (L) 33.6 - 35.0 g/dL    RDW 43.8 35.9 - 50.0 fL    Platelet Count 224 164 - 446 K/uL    MPV 10.0 9.0 - 12.9 fL    Neutrophils-Polys 70.60 44.00 - 72.00 %    Lymphocytes  23.20 22.00 - 41.00 %    Monocytes 4.90 0.00 - 13.40 %    Eosinophils 0.30 0.00 - 6.90 %    Basophils 0.70 0.00 - 1.80 %    Immature Granulocytes 0.30 0.00 - 0.90 %    Nucleated RBC 0.00 /100 WBC    Neutrophils (Absolute) 5.10 2.00 - 7.15 K/uL    Lymphs (Absolute) 1.67 1.00 - 4.80 K/uL    Monos (Absolute) 0.35 0.00 - 0.85 K/uL    Eos (Absolute) 0.02 0.00 - 0.51 K/uL    Baso (Absolute) 0.05 0.00 - 0.12 K/uL    Immature Granulocytes (abs) 0.02 0.00 - 0.11 K/uL    NRBC (Absolute) 0.00 K/uL   Complete Metabolic Panel (CMP)   Result Value Ref Range    Sodium 139 135 - 145 mmol/L    Potassium 4.0 3.6 - 5.5 mmol/L    Chloride 105 96 - 112 mmol/L    Co2 23 20 - 33 mmol/L    Anion Gap 11.0 7.0 - 16.0    Glucose 89 65 - 99 mg/dL    Bun 12 8 - 22 mg/dL    Creatinine 0.65 0.50 - 1.40 mg/dL    Calcium 9.3 8.4 - 10.2 mg/dL    AST(SGOT) 14 12 - 45 U/L    ALT(SGPT) 9 2 - 50 U/L    Alkaline Phosphatase 54 30 - 99 U/L    Total Bilirubin 0.5 0.1 - 1.5 mg/dL    Albumin 3.9 3.2 - 4.9 g/dL    Total Protein 6.3 6.0 - 8.2 g/dL    Globulin 2.4 1.9 - 3.5 g/dL    A-G Ratio 1.6 g/dL   Troponin STAT   Result Value Ref Range    Troponin T <6 6 - 19 ng/L   TSH WITH REFLEX TO FT4   Result Value Ref Range    TSH 0.545 0.380 - 5.330 uIU/mL   ESTIMATED GFR   Result Value Ref Range    GFR If African American >60 >60 mL/min/1.73 m 2    GFR If Non African American >60 >60 mL/min/1.73 m 2   EKG   Result Value Ref Range    Report       [facility] Emergency Dept.    Test Date:  2021-10-14  Pt Name:    LIZZY EVANS                 Department: EDS  MRN:        [id number]                      [facility]  Gender:     Female                       Technician: CRISTAL  :        [id number]                   Requested By:ER TRIAGE PROTOCOL  Order #:    [id number]                    Reading MD: NATHALY THOMAS MD    Measurements  Intervals                                Axis  Rate:       44                           P:           158  CT:         131                          QRS:        -26  QRSD:       126                          T:          -75  QT:         461  QTc:        395    Interpretive Statements  SINUS BRADYCARDIA  Nonspecific intraventricular conduction delay  Borderline repolarization abnormality  Lead(s) II were not used for morphology analysis  Baseline wander in lead(s) I,aVR,V4  Compared to ECG 03/11/2021 10:07:27  Bradycardia,  now present  Intraventricular conduction delay now  present  Sinus rhythm no longer present  Electronically Signed On 10- 11:11:16 PDT by NATHALY THOMAS MD       HYDRATION: Based on the patient's presentation of dehydration due to her positive orthostatic state,  the patient was given IV fluids. IV Hydration was used because oral hydration is unable to be done due to the patient's symptoms. Upon recheck following hydration, the patient was improved.      COURSE & MEDICAL DECISION MAKING  Pertinent Labs & Imaging studies reviewed. (See chart for details)  Patient presents to the ED for evaluation.  The patient has bradycardic her blood pressures on the monitor are from the 8090s to the 100 systolic range.  Orthostatically the patient's blood pressure at rest was 89 and then went up to 100s range and the heart rate went from 66 down to 47.  The patient was asymptomatic during the period.  Patient was given a liter bolus of lactated Ringer's and she actually states that she feels much better orthostatics were redone and it was about the same she is still bradycardic but not symptomatic at this point.  At this point I do not feel that the patient's dizziness or sensation of weakness is related to her bradycardia however it is of concern.  Patient has had a full cardiac work-up in the past to include echocardiography.  At this point the patient has been stable laboratory studies are normal EKG shows bradycardia.  At this point I feel the patient is stable for discharge I have recommended that  the patient follow-up with cardiology for recheck.  Certainly as she gets older it may be an issue that she may need a pacemaker but at this point I do not feel an emergent intervention is necessary and I do feel the patient is stable for discharge.      FINAL IMPRESSION  1. Bradycardia  REFERRAL TO CARDIOLOGY   2. Weakness        The patient will return for new or worsening symptoms and is stable at the time of discharge.    The patient is referred to a primary physician for blood pressure management, diabetic screening, and for all other preventative health concerns.        DISPOSITION:  Patient will be discharged home in stable condition.    FOLLOW UP:  [facility]  [address] [place] [address] [address]  [facility] [id number] [phone]        [facility] [place] [id number] [facility] [id number] [phone]        [address] [id number] [phone]          OUTPATIENT MEDICATIONS:  New Prescriptions    No medications on file             Electronically signed by: Manish Hartman M.D., 10/14/2021 11:11 AM

## 2021-10-14 NOTE — ED TRIAGE NOTES
"Chief Complaint   Patient presents with   • Sent from Urgent Care     EKG done, showing Sinus Onofre   • Weakness     x2 days, c/o dizziness, lightheaded, tired, unable to stand for long periods of time w/o feeling faint     /67   Pulse (!) 53   Temp 36.4 °C (97.5 °F) (Temporal)   Resp 16   Ht 1.626 m (5' 4\")   Wt 84 kg (185 lb 3 oz)   LMP 11/20/2019   SpO2 98%   BMI 31.79 kg/m²     Pt arrived w/ above concern    Has this patient been vaccinated for COVID - NO  If not, would they like to be vaccinated while in the ER if eligible?  no  Would the patient like to speak with the ERP about the possibility of receiving the COVID vaccine today before making a decision? no    "

## 2021-10-14 NOTE — ED NOTES
ERP at bedside. Pt agrees with plan of care discussed by ERP. AIDET acknowledged with patient. IV established. Blood sent to lab. Mike in low position, side rail up for pt safety. Call light within reach. Will continue to monIV established. Blood sent to lab.

## 2021-10-20 ENCOUNTER — OFFICE VISIT (OUTPATIENT)
Dept: CARDIOLOGY | Facility: MEDICAL CENTER | Age: 36
End: 2021-10-20
Attending: EMERGENCY MEDICINE
Payer: MEDICAID

## 2021-10-20 VITALS
OXYGEN SATURATION: 96 % | SYSTOLIC BLOOD PRESSURE: 90 MMHG | RESPIRATION RATE: 14 BRPM | HEIGHT: 64 IN | DIASTOLIC BLOOD PRESSURE: 70 MMHG | BODY MASS INDEX: 28.38 KG/M2 | HEART RATE: 70 BPM | WEIGHT: 166.2 LBS

## 2021-10-20 DIAGNOSIS — I49.3 PREMATURE VENTRICULAR COMPLEX: ICD-10-CM

## 2021-10-20 DIAGNOSIS — I49.1 PREMATURE ATRIAL COMPLEX: ICD-10-CM

## 2021-10-20 DIAGNOSIS — R00.1: ICD-10-CM

## 2021-10-20 DIAGNOSIS — R00.1 BRADYCARDIA: ICD-10-CM

## 2021-10-20 PROCEDURE — 99204 OFFICE O/P NEW MOD 45 MIN: CPT | Performed by: INTERNAL MEDICINE

## 2021-10-20 RX ORDER — ALPRAZOLAM 1 MG/1
TABLET ORAL
COMMUNITY
Start: 2021-09-14 | End: 2021-10-20

## 2021-10-20 RX ORDER — ONDANSETRON 4 MG/1
TABLET, FILM COATED ORAL
COMMUNITY
Start: 2021-08-18 | End: 2021-10-20

## 2021-10-20 RX ORDER — CARIPRAZINE 3 MG/1
CAPSULE, GELATIN COATED ORAL
COMMUNITY
Start: 2021-09-13 | End: 2021-10-20

## 2021-10-20 ASSESSMENT — ENCOUNTER SYMPTOMS
BRUISES/BLEEDS EASILY: 0
BLOOD IN STOOL: 0
NAUSEA: 0
WEIGHT LOSS: 0
EYE PAIN: 0
LOSS OF CONSCIOUSNESS: 0
ORTHOPNEA: 0
HALLUCINATIONS: 0
EYE DISCHARGE: 0
BLURRED VISION: 0
COUGH: 0
DEPRESSION: 0
SPEECH CHANGE: 0
MYALGIAS: 0
SENSORY CHANGE: 0
HEADACHES: 0
SHORTNESS OF BREATH: 0
PND: 0
CHILLS: 0
DIZZINESS: 0
ABDOMINAL PAIN: 0
FEVER: 0
DOUBLE VISION: 0
PALPITATIONS: 0
CLAUDICATION: 0
FALLS: 0
VOMITING: 0

## 2021-10-20 ASSESSMENT — FIBROSIS 4 INDEX: FIB4 SCORE: .7083333333333333333

## 2021-10-20 NOTE — PROGRESS NOTES
Chief Complaint   Patient presents with   • Bradycardia       Subjective     Yasemin Bradley is a 36 y.o. female who presents today cardiac care and evaluation of presyncopal episdoes. Found to have bradycardia in the ER. Sinus bradycardia of 44. That day was stressful for her. Patient has not sustained any injuries thus far. There is feeling of warm sensation covering entire body when this happens. No family history of sudden cardiac death. No prior childhood cardiac history.    I personally reviewed her EKGs.     Patient also was going off of pain pills after her gallbladder issue during that time.    Patient had cardiac work-up such as transthoracic echocardiogram and cardiac event monitor before her weight loss at [facility].  I was told that the results were not abnormal overall.  She did have PVCs and PACs identified.      Past Medical History:   Diagnosis Date   • Anesthesia     slow to wake up   • Arrhythmia     PVCs and PACs, has had holter in the past   • ASTHMA     in the past, no problem now   • Cold April 2017    upper respiratory infection   • Colitis    • Dysmenorrhea    • Fibromyalgia    • Heart murmur     small murmur   • Indigestion    • Narcotic abuse (HCC)    • Ovarian cyst    • PID (pelvic inflammatory disease)    • Psychiatric problem     anxiety, ADHD   • PVC's (premature ventricular contractions)    • Snoring    • Urinary bladder disorder     frequent UTI's     Past Surgical History:   Procedure Laterality Date   • PEDRITO BY LAPAROSCOPY  7/7/2021    Procedure: CHOLECYSTECTOMY, LAPAROSCOPIC;  Surgeon: Harper Carlisle M.D.;  Location: SURGERY [facility];  Service: Gen Robotic   • PB LAP, CASTRO RESTRICT PROC, LONGITUDINAL GAS* N/A 3/15/2021    Procedure: GASTRECTOMY, SLEEVE, LAPAROSCOPIC.;  Surgeon: John H Ganser, M.D.;  Location: SURGERY [facility];  Service: General   • HYSTERECTOMY LAPAROSCOPY N/A 11/21/2019    Procedure: HYSTERECTOMY, LAPAROSCOPIC;  Surgeon: Jericho Du M.D.;   Location: SURGERY SAME DAY [facility];  Service: Gynecology   • SALPINGECTOMY Bilateral 2019    Procedure: SALPINGECTOMY;  Surgeon: Jericho Fields M.D.;  Location: SURGERY SAME DAY [facility];  Service: Gynecology   • CYSTOSCOPY N/A 2019    Procedure: CYSTOSCOPY;  Surgeon: Jericho Fields M.D.;  Location: SURGERY SAME DAY [facility];  Service: Gynecology   • HYSTEROSCOPY THERMAL ABLATION N/A 7/10/2017    Procedure: HYSTEROSCOPY THERMAL ABLATION endometrial ;  Surgeon: Jericho Fields M.D.;  Location: SURGERY SAME DAY [facility];  Service:    • TUBAL COAGULATION LAPAROSCOPIC BILATERAL Bilateral 7/10/2017    Procedure: TUBAL COAGULATION LAPAROSCOPIC BILATERAL;  Surgeon: Jericho Fields M.D.;  Location: SURGERY SAME DAY [facility];  Service:    • DILATION AND CURETTAGE N/A 7/10/2017    Procedure: DILATION AND CURETTAGE;  Surgeon: Jericho Fields M.D.;  Location: SURGERY SAME DAY [facility];  Service:    • PRIMARY C SECTION  2010    Performed by JERICHO FIELDS at LABOR AND DELIVERY   • OTHER       T & A  age 14      Family History   Problem Relation Age of Onset   • No Known Problems Mother    • No Known Problems Father      Social History     Socioeconomic History   • Marital status: Single     Spouse name: Not on file   • Number of children: Not on file   • Years of education: Not on file   • Highest education level: Not on file   Occupational History   • Not on file   Tobacco Use   • Smoking status: Former Smoker     Types: Cigarettes     Quit date: 2007     Years since quittin.8   • Smokeless tobacco: Never Used   Vaping Use   • Vaping Use: Never used   Substance and Sexual Activity   • Alcohol use: No   • Drug use: No     Types: Oral     Comment: hx of opiate addiction, clean since 14   • Sexual activity: Not on file   Other Topics Concern   • Not on file   Social History Narrative   • Not on file     Social Determinants of Health     Financial Resource Strain:  Low Risk    • Difficulty of Paying Living Expenses: Not hard at all   Food Insecurity: No Food Insecurity   • Worried About Running Out of Food in the Last Year: Never true   • Ran Out of Food in the Last Year: Never true   Transportation Needs: No Transportation Needs   • Lack of Transportation (Medical): No   • Lack of Transportation (Non-Medical): No   Physical Activity:    • Days of Exercise per Week:    • Minutes of Exercise per Session:    Stress:    • Feeling of Stress :    Social Connections:    • Frequency of Communication with Friends and Family:    • Frequency of Social Gatherings with Friends and Family:    • Attends Mandaeism Services:    • Active Member of Clubs or Organizations:    • Attends Club or Organization Meetings:    • Marital Status:    Intimate Partner Violence:    • Fear of Current or Ex-Partner:    • Emotionally Abused:    • Physically Abused:    • Sexually Abused:      Allergies   Allergen Reactions   • Kdc:Yellow Dye+Ci Pigment Blue 63+Metoclopramide      Involuntary motor tick     Outpatient Encounter Medications as of 10/20/2021   Medication Sig Dispense Refill   • acetaminophen (TYLENOL) 500 MG Tab Take 1,000 mg by mouth in the morning, at noon, and at bedtime.     • [DISCONTINUED] ALPRAZolam (XANAX) 1 MG Tab  (Patient not taking: Reported on 10/20/2021)     • [DISCONTINUED] VRAYLAR 3 MG Cap  (Patient not taking: Reported on 10/20/2021)     • [DISCONTINUED] ondansetron (ZOFRAN) 4 MG Tab tablet  (Patient not taking: Reported on 10/20/2021)     • [DISCONTINUED] 5-Hydroxytryptophan (5-HTP PO) Take 1 Tablet by mouth every day. (Patient not taking: Reported on 10/20/2021)       No facility-administered encounter medications on file as of 10/20/2021.     Review of Systems   Constitutional: Negative for chills, fever, malaise/fatigue and weight loss.   HENT: Negative for ear discharge, ear pain, hearing loss and nosebleeds.    Eyes: Negative for blurred vision, double vision, pain and  "discharge.   Respiratory: Negative for cough and shortness of breath.    Cardiovascular: Negative for chest pain, palpitations, orthopnea, claudication, leg swelling and PND.   Gastrointestinal: Negative for abdominal pain, blood in stool, melena, nausea and vomiting.   Genitourinary: Negative for dysuria and hematuria.   Musculoskeletal: Negative for falls, joint pain and myalgias.   Skin: Negative for itching and rash.   Neurological: Negative for dizziness, sensory change, speech change, loss of consciousness and headaches.   Endo/Heme/Allergies: Negative for environmental allergies. Does not bruise/bleed easily.   Psychiatric/Behavioral: Negative for depression, hallucinations and suicidal ideas.       Objective     BP (!) 90/70 (BP Location: Left arm, Patient Position: Sitting, BP Cuff Size: Adult)   Pulse 70   Resp 14   Ht 1.626 m (5' 4\")   Wt 75.4 kg (166 lb 3.2 oz)   LMP 11/20/2019   SpO2 96%   BMI 28.53 kg/m²     Physical Exam  Vitals and nursing note reviewed.   Constitutional:       General: She is not in acute distress.     Appearance: She is not diaphoretic.   HENT:      Head: Normocephalic and atraumatic.      Right Ear: External ear normal.      Left Ear: External ear normal.      Nose: No congestion or rhinorrhea.   Eyes:      General:         Right eye: No discharge.         Left eye: No discharge.   Neck:      Thyroid: No thyromegaly.      Vascular: No JVD.   Cardiovascular:      Rate and Rhythm: Normal rate and regular rhythm.      Pulses: Normal pulses.   Pulmonary:      Effort: No respiratory distress.   Abdominal:      General: There is no distension.      Tenderness: There is no abdominal tenderness.   Musculoskeletal:         General: No swelling or tenderness.      Right lower leg: No edema.      Left lower leg: No edema.   Skin:     General: Skin is warm and dry.   Neurological:      Mental Status: She is alert and oriented to person, place, and time.      Cranial Nerves: No cranial " nerve deficit.   Psychiatric:         Behavior: Behavior normal.         Assessment & Plan     1. Bradycardia  EC-ECHOCARDIOGRAM REST/STRESS W/O CONT   2. Vagal autonomic bradycardia  EC-ECHOCARDIOGRAM REST/STRESS W/O CONT   3. Premature ventricular complex  EC-ECHOCARDIOGRAM REST/STRESS W/O CONT   4. Premature atrial complex  EC-ECHOCARDIOGRAM REST/STRESS W/O CONT       Medical Decision Making: Today's Assessment/Status/Plan:   At this time, to further risk stratify, I will also order a echo treadmill stress test (to avoid radiation exposure in young patients) to assess for coronary ischemia.  I will obtain the records of her TTE and event monitor from [facility].    Symptomatology is likely related to vaso-vagal episodes.   Advise increase hydration and salt intake with sports drinks for the time being.

## 2022-01-17 ENCOUNTER — TELEPHONE (OUTPATIENT)
Dept: CARDIOLOGY | Facility: MEDICAL CENTER | Age: 37
End: 2022-01-17

## 2022-08-09 ENCOUNTER — HOSPITAL ENCOUNTER (EMERGENCY)
Facility: MEDICAL CENTER | Age: 37
End: 2022-08-09
Attending: EMERGENCY MEDICINE
Payer: MEDICAID

## 2022-08-09 VITALS
OXYGEN SATURATION: 100 % | WEIGHT: 151.68 LBS | DIASTOLIC BLOOD PRESSURE: 71 MMHG | BODY MASS INDEX: 25.89 KG/M2 | TEMPERATURE: 98.2 F | HEART RATE: 80 BPM | HEIGHT: 64 IN | SYSTOLIC BLOOD PRESSURE: 113 MMHG | RESPIRATION RATE: 16 BRPM

## 2022-08-09 DIAGNOSIS — S43.401A SPRAIN OF RIGHT SHOULDER, UNSPECIFIED SHOULDER SPRAIN TYPE, INITIAL ENCOUNTER: Primary | ICD-10-CM

## 2022-08-09 DIAGNOSIS — R63.1 INCREASED THIRST: ICD-10-CM

## 2022-08-09 DIAGNOSIS — R52 BODY ACHES: ICD-10-CM

## 2022-08-09 LAB
ALBUMIN SERPL BCP-MCNC: 4.2 G/DL (ref 3.2–4.9)
ALBUMIN/GLOB SERPL: 1.8 G/DL
ALP SERPL-CCNC: 63 U/L (ref 30–99)
ALT SERPL-CCNC: 8 U/L (ref 2–50)
ANION GAP SERPL CALC-SCNC: 10 MMOL/L (ref 7–16)
APPEARANCE UR: CLEAR
AST SERPL-CCNC: 16 U/L (ref 12–45)
BASOPHILS # BLD AUTO: 0.7 % (ref 0–1.8)
BASOPHILS # BLD: 0.04 K/UL (ref 0–0.12)
BILIRUB SERPL-MCNC: 0.8 MG/DL (ref 0.1–1.5)
BILIRUB UR QL STRIP.AUTO: NEGATIVE
BUN SERPL-MCNC: 7 MG/DL (ref 8–22)
CALCIUM SERPL-MCNC: 9.1 MG/DL (ref 8.4–10.2)
CHLORIDE SERPL-SCNC: 104 MMOL/L (ref 96–112)
CK SERPL-CCNC: 73 U/L (ref 0–154)
CO2 SERPL-SCNC: 25 MMOL/L (ref 20–33)
COLOR UR: YELLOW
CREAT SERPL-MCNC: 0.64 MG/DL (ref 0.5–1.4)
EOSINOPHIL # BLD AUTO: 0.06 K/UL (ref 0–0.51)
EOSINOPHIL NFR BLD: 1 % (ref 0–6.9)
ERYTHROCYTE [DISTWIDTH] IN BLOOD BY AUTOMATED COUNT: 44.9 FL (ref 35.9–50)
GFR SERPLBLD CREATININE-BSD FMLA CKD-EPI: 118 ML/MIN/1.73 M 2
GLOBULIN SER CALC-MCNC: 2.3 G/DL (ref 1.9–3.5)
GLUCOSE SERPL-MCNC: 86 MG/DL (ref 65–99)
GLUCOSE UR STRIP.AUTO-MCNC: NEGATIVE MG/DL
HCG UR QL: NEGATIVE
HCT VFR BLD AUTO: 42.7 % (ref 37–47)
HGB BLD-MCNC: 14.6 G/DL (ref 12–16)
IMM GRANULOCYTES # BLD AUTO: 0.01 K/UL (ref 0–0.11)
IMM GRANULOCYTES NFR BLD AUTO: 0.2 % (ref 0–0.9)
KETONES UR STRIP.AUTO-MCNC: NEGATIVE MG/DL
LEUKOCYTE ESTERASE UR QL STRIP.AUTO: NEGATIVE
LYMPHOCYTES # BLD AUTO: 1.84 K/UL (ref 1–4.8)
LYMPHOCYTES NFR BLD: 30.7 % (ref 22–41)
MAGNESIUM SERPL-MCNC: 1.9 MG/DL (ref 1.5–2.5)
MCH RBC QN AUTO: 31.4 PG (ref 27–33)
MCHC RBC AUTO-ENTMCNC: 34.2 G/DL (ref 33.6–35)
MCV RBC AUTO: 91.8 FL (ref 81.4–97.8)
MICRO URNS: NORMAL
MONOCYTES # BLD AUTO: 0.26 K/UL (ref 0–0.85)
MONOCYTES NFR BLD AUTO: 4.3 % (ref 0–13.4)
NEUTROPHILS # BLD AUTO: 3.79 K/UL (ref 2–7.15)
NEUTROPHILS NFR BLD: 63.1 % (ref 44–72)
NITRITE UR QL STRIP.AUTO: NEGATIVE
NRBC # BLD AUTO: 0 K/UL
NRBC BLD-RTO: 0 /100 WBC
PH UR STRIP.AUTO: 7.5 [PH] (ref 5–8)
PHOSPHATE SERPL-MCNC: 3.2 MG/DL (ref 2.5–4.5)
PLATELET # BLD AUTO: 229 K/UL (ref 164–446)
PMV BLD AUTO: 9.4 FL (ref 9–12.9)
POTASSIUM SERPL-SCNC: 3.7 MMOL/L (ref 3.6–5.5)
PROT SERPL-MCNC: 6.5 G/DL (ref 6–8.2)
PROT UR QL STRIP: NEGATIVE MG/DL
RBC # BLD AUTO: 4.65 M/UL (ref 4.2–5.4)
RBC UR QL AUTO: NEGATIVE
SODIUM SERPL-SCNC: 139 MMOL/L (ref 135–145)
SP GR UR STRIP.AUTO: 1.02
WBC # BLD AUTO: 6 K/UL (ref 4.8–10.8)

## 2022-08-09 PROCEDURE — 99284 EMERGENCY DEPT VISIT MOD MDM: CPT

## 2022-08-09 PROCEDURE — 84100 ASSAY OF PHOSPHORUS: CPT

## 2022-08-09 PROCEDURE — 81003 URINALYSIS AUTO W/O SCOPE: CPT

## 2022-08-09 PROCEDURE — 82550 ASSAY OF CK (CPK): CPT

## 2022-08-09 PROCEDURE — 36415 COLL VENOUS BLD VENIPUNCTURE: CPT

## 2022-08-09 PROCEDURE — 85025 COMPLETE CBC W/AUTO DIFF WBC: CPT

## 2022-08-09 PROCEDURE — 83735 ASSAY OF MAGNESIUM: CPT

## 2022-08-09 PROCEDURE — A9270 NON-COVERED ITEM OR SERVICE: HCPCS | Performed by: EMERGENCY MEDICINE

## 2022-08-09 PROCEDURE — 81025 URINE PREGNANCY TEST: CPT

## 2022-08-09 PROCEDURE — 80053 COMPREHEN METABOLIC PANEL: CPT

## 2022-08-09 PROCEDURE — 700102 HCHG RX REV CODE 250 W/ 637 OVERRIDE(OP): Performed by: EMERGENCY MEDICINE

## 2022-08-09 PROCEDURE — 700111 HCHG RX REV CODE 636 W/ 250 OVERRIDE (IP): Performed by: EMERGENCY MEDICINE

## 2022-08-09 PROCEDURE — 96374 THER/PROPH/DIAG INJ IV PUSH: CPT

## 2022-08-09 RX ORDER — METHOCARBAMOL 500 MG/1
1500 TABLET, FILM COATED ORAL ONCE
Status: COMPLETED | OUTPATIENT
Start: 2022-08-09 | End: 2022-08-09

## 2022-08-09 RX ORDER — KETOROLAC TROMETHAMINE 30 MG/ML
15 INJECTION, SOLUTION INTRAMUSCULAR; INTRAVENOUS ONCE
Status: COMPLETED | OUTPATIENT
Start: 2022-08-09 | End: 2022-08-09

## 2022-08-09 RX ORDER — OXYCODONE HYDROCHLORIDE AND ACETAMINOPHEN 5; 325 MG/1; MG/1
1 TABLET ORAL ONCE
Status: COMPLETED | OUTPATIENT
Start: 2022-08-09 | End: 2022-08-09

## 2022-08-09 RX ADMIN — OXYCODONE AND ACETAMINOPHEN 1 TABLET: 5; 325 TABLET ORAL at 18:28

## 2022-08-09 RX ADMIN — METHOCARBAMOL 1500 MG: 500 TABLET ORAL at 18:08

## 2022-08-09 RX ADMIN — KETOROLAC TROMETHAMINE 15 MG: 30 INJECTION, SOLUTION INTRAMUSCULAR; INTRAVENOUS at 19:09

## 2022-08-09 ASSESSMENT — ENCOUNTER SYMPTOMS
ABDOMINAL PAIN: 0
VOMITING: 0
BACK PAIN: 1
CHILLS: 0
FEVER: 0
NAUSEA: 0

## 2022-08-09 ASSESSMENT — FIBROSIS 4 INDEX: FIB4 SCORE: .7291666666666666667

## 2022-08-09 NOTE — ED TRIAGE NOTES
"Chief Complaint   Patient presents with   • Shoulder Injury     36 yo female presents with Right shoulder injury Sunday while playing volleyball.  Patient also reports left sided flank pain that started today.  Reports she feels like she is dehydrated and cant quench her thirst today.  Denies fever. Denies nausea or vomiting.     /76   Pulse 60   Temp 37.3 °C (99.2 °F) (Temporal)   Resp 18   Ht 1.626 m (5' 4\")   Wt 68.8 kg (151 lb 10.8 oz)   LMP 11/20/2019   SpO2 97%   BMI 26.04 kg/m²        Patient is Covid Vaccinated   "

## 2022-08-10 NOTE — ED NOTES
PT cleared for DC home pt in stable condition no s/s of acute distress breathing is even & unlabored pt to f/u with pcp  to drive pt home pt advised to return to closest ED for any worsening symptoms pt and family ambulated out of ED w/steady gait advised to return to closest ED for nay worsening symptoms

## 2022-08-10 NOTE — ED PROVIDER NOTES
"ED Provider Note    Scribed for Margarito Mccall II, M* by Fadia Adame. 8/9/2022  5:21 PM    Means of Arrival: walk-in  History obtained by: patient  Limitations: none    CHIEF COMPLAINT  Chief Complaint   Patient presents with   • Shoulder Injury     36 yo female presents with Right shoulder injury Sunday while playing volleyball.  Patient also reports left sided flank pain that started today.  Reports she feels like she is dehydrated and cant quench her thirst today.  Denies fever. Denies nausea or vomiting.         HPI  Yasemin Bradley is a 37 y.o. female who presents to the Emergency Department for evaluation of shooting left flank pain onset two days ago. On Sunday, Yasemin was playing volleyball when she injured her right shoulder. Her shoulder pain is exacerbated when lifting her arm upwards. Yasemin believes she may have \"pulled a muscle\". On Monday Yasemin developed generalized body aches and increased thirst. Monday evening she developed mild left sided lower back pain which has been gradually worsening. Today her pain has worsened significantly and states \"it feels like I've been hit with a truck\". She denies fever, nausea, vomiting, dysuria, or hematuria. No alleviating or exacerbating factors were reported. Back in March, Yasemin had a breast augmentation.     REVIEW OF SYSTEMS  Review of Systems   Constitutional: Negative for chills and fever.        Positive body aches and increased thirst   Gastrointestinal: Negative for abdominal pain, nausea and vomiting.   Genitourinary: Negative for dysuria and hematuria.   Musculoskeletal: Positive for back pain (left sided).        Positive for right shoulder pain   All other systems reviewed and are negative.    See HPI for further details.    PAST MEDICAL HISTORY   has a past medical history of Anesthesia, Arrhythmia, ASTHMA, Cold (April 2017), Colitis, Dysmenorrhea, Fibromyalgia, Heart murmur, Indigestion, Narcotic abuse (HCC), Ovarian cyst, PID (pelvic " "inflammatory disease), Psychiatric problem, PVC's (premature ventricular contractions), Snoring, and Urinary bladder disorder.    SOCIAL HISTORY  Social History     Tobacco Use   • Smoking status: Former Smoker     Types: Cigarettes     Quit date: 1/1/2007     Years since quitting: 15.6   • Smokeless tobacco: Never Used   Vaping Use   • Vaping Use: Never used   Substance and Sexual Activity   • Alcohol use: No   • Drug use: No     Types: Oral     Comment: hx of opiate addiction, clean since 12/12/12   • Sexual activity: Not reported       SURGICAL HISTORY   has a past surgical history that includes other; primary c section (12/19/2010); hysteroscopy thermal ablation (N/A, 7/10/2017); tubal coagulation laparoscopic bilateral (Bilateral, 7/10/2017); dilation and curettage (N/A, 7/10/2017); hysterectomy laparoscopy (N/A, 11/21/2019); salpingectomy (Bilateral, 11/21/2019); cystoscopy (N/A, 11/21/2019); lap, pillo restrict proc, longitudinal gas* (N/A, 3/15/2021); and luis fernando by laparoscopy (7/7/2021).    CURRENT MEDICATIONS  Home Medications     Reviewed by Griselda Zaragoza R.N. (Registered Nurse) on 08/09/22 at 1608  Med List Status: Not Addressed   Medication Last Dose Status   acetaminophen (TYLENOL) 500 MG Tab  Active   methylPREDNISolone (MEDROL DOSEPAK) 4 MG Tablet Therapy Pack  Active                ALLERGIES  Allergies   Allergen Reactions   • Kdc:Yellow Dye+Ci Pigment Blue 63+Metoclopramide      Involuntary motor tick       PHYSICAL EXAM  VITAL SIGNS: /76   Pulse 60   Temp 37.3 °C (99.2 °F) (Temporal)   Resp 18   Ht 1.626 m (5' 4\")   Wt 68.8 kg (151 lb 10.8 oz)   LMP 11/20/2019   SpO2 97%   BMI 26.04 kg/m²     Pulse ox interpretation: I interpret this pulse ox as normal.  Constitutional: Alert pleasant 37 y.o. woman in no apparent distress.   HENT: No signs of trauma, Bilateral external ears normal, Nose normal.   Eyes: Pupils are equal, Conjunctiva normal, Non-icteric.   Neck: Normal range of motion, " No tenderness, Supple, No stridor.   Cardiovascular: Regular rate and rhythm, no murmurs. Symmetric distal pulses. No cyanosis of extremities. No peripheral edema of extremities.  Thorax & Lungs: Normal breath sounds, No respiratory distress, No wheezing, No chest tenderness.   Abdomen: Soft, No tenderness, No masses, No pulsatile masses. No peritoneal signs.  Skin: Warm, Dry, No erythema, No rash.   Back: Tenderness at the left paraspinal region, no midline spine tenderness or step offs.   Musculoskeletal: Able to range shoulder but when she raises it there is some anterior pain, No major deformities noted. No neurovascular deficits.   Neurologic: Alert , Normal motor function, Normal sensory function, No focal deficits noted.   Psychiatric: Affect normal, Judgment normal, Mood normal.     DIAGNOSTIC STUDIES / PROCEDURES    LABS  Pertinent Labs & Imaging studies reviewed. (See chart for details)  Labs Reviewed   COMP METABOLIC PANEL - Abnormal; Notable for the following components:       Result Value    Bun 7 (*)     All other components within normal limits   URINALYSIS,CULTURE IF INDICATED    Narrative:     Indication for culture:->Patient WITHOUT an indwelling Patel  catheter in place with new onset of Dysuria, Frequency,  Urgency, and/or Suprapubic pain   HCG QUALITATIVE UR    Narrative:     Indication for culture:->Patient WITHOUT an indwelling Patel  catheter in place with new onset of Dysuria, Frequency,  Urgency, and/or Suprapubic pain   CBC WITH DIFFERENTIAL   MAGNESIUM   PHOSPHORUS   CREATINE KINASE   ESTIMATED GFR       COURSE & MEDICAL DECISION MAKING  Pertinent Labs & Imaging studies reviewed. (See chart for details)    5:21 PM This is a 37 y.o. female who presents with left lower back pain and right shoulder pain. She also describes generalized body aches. The differential diagnosis includes but is not limited to Muscle sprain, electrolyte abnormalities, dehydration, acute renal failure,  rhabdomyolysis, back strain, rotator cuff tear/ shoulder sprain. No suspicion for right shoulder dislocation or fracture. Per protocol triage ordered for HCG Qual, UA. Urine shows no signs of infection. Ordered for Creatine Kinase, Phosphorus, Magnesium, CMP, and CBC with diff to evaluate. Patient will be treated with Robaxin 1,500 mg tablet and Percocet one 5 -35 mg tablet for her pain.     6:54 PM Blood counts, electrolytes, and CPK are normal. Unclear cause for her increased thirst. There is no obvious dehydration. She will be given Toradol 15 mg for her body aches and shoulder strain.     7:32 PM Patient was reevaluated at bedside. Pain has resolved. She says she is feeling much better.  Patient will be discharged at this time. Patient verbalizes understanding and agreement to this plan of care.      The patient will return for worsening symptoms and is stable at the time of discharge. The patient verbalizes understanding and will comply. Guidance provided on appropriate use of medications.    DISPOSITION:  Patient will be discharged home in stable condition.    FOLLOW UP:  Connor Akins M.D.  [address] [place] [address]   [address] [address]  [phone]    Schedule an appointment as soon as possible for a visit         OUTPATIENT MEDICATIONS:  Discharge Medication List as of 8/9/2022  8:06 PM            FINAL IMPRESSION  1. Sprain of right shoulder, unspecified shoulder sprain type, initial encounter Active   2. Body aches    3. Increased thirst          Fadia VELAZQUEZ), am scribing for, and in the presence of, NIRAV Higgins II.    Electronically signed by: Fadia Khoury), 8/9/2022    Margarito VELAZQUEZ II, M* personally performed the services described in this documentation, as scribed by Fadia Adame in my presence, and it is both accurate and complete.    The note accurately reflects work and decisions made by me.  Margarito Mccall II, M.D.  8/9/2022  11:41  PM

## 2022-11-16 ENCOUNTER — HOSPITAL ENCOUNTER (OUTPATIENT)
Dept: RADIOLOGY | Facility: MEDICAL CENTER | Age: 37
End: 2022-11-16
Attending: PHYSICAL MEDICINE & REHABILITATION
Payer: COMMERCIAL

## 2022-11-16 DIAGNOSIS — M51.34 DEGENERATION OF THORACIC INTERVERTEBRAL DISC: ICD-10-CM

## 2022-11-16 PROCEDURE — 72146 MRI CHEST SPINE W/O DYE: CPT

## 2023-02-10 ENCOUNTER — HOSPITAL ENCOUNTER (OUTPATIENT)
Dept: LAB | Facility: MEDICAL CENTER | Age: 38
End: 2023-02-10
Attending: FAMILY MEDICINE
Payer: COMMERCIAL

## 2023-02-10 LAB
25(OH)D3 SERPL-MCNC: 49 NG/ML (ref 30–100)
ALBUMIN SERPL BCP-MCNC: 3.9 G/DL (ref 3.2–4.9)
ALBUMIN/GLOB SERPL: 1.6 G/DL
ALP SERPL-CCNC: 64 U/L (ref 30–99)
ALT SERPL-CCNC: 15 U/L (ref 2–50)
ANION GAP SERPL CALC-SCNC: 8 MMOL/L (ref 7–16)
AST SERPL-CCNC: 17 U/L (ref 12–45)
BASOPHILS # BLD AUTO: 0.4 % (ref 0–1.8)
BASOPHILS # BLD: 0.02 K/UL (ref 0–0.12)
BILIRUB SERPL-MCNC: 0.6 MG/DL (ref 0.1–1.5)
BUN SERPL-MCNC: 13 MG/DL (ref 8–22)
CALCIUM ALBUM COR SERPL-MCNC: 9.8 MG/DL (ref 8.5–10.5)
CALCIUM SERPL-MCNC: 9.7 MG/DL (ref 8.5–10.5)
CHLORIDE SERPL-SCNC: 102 MMOL/L (ref 96–112)
CHOLEST SERPL-MCNC: 183 MG/DL (ref 100–199)
CO2 SERPL-SCNC: 28 MMOL/L (ref 20–33)
CREAT SERPL-MCNC: 0.57 MG/DL (ref 0.5–1.4)
EOSINOPHIL # BLD AUTO: 0.13 K/UL (ref 0–0.51)
EOSINOPHIL NFR BLD: 2.9 % (ref 0–6.9)
ERYTHROCYTE [DISTWIDTH] IN BLOOD BY AUTOMATED COUNT: 40.9 FL (ref 35.9–50)
FASTING STATUS PATIENT QL REPORTED: NORMAL
GFR SERPLBLD CREATININE-BSD FMLA CKD-EPI: 121 ML/MIN/1.73 M 2
GLOBULIN SER CALC-MCNC: 2.5 G/DL (ref 1.9–3.5)
GLUCOSE SERPL-MCNC: 87 MG/DL (ref 65–99)
HCT VFR BLD AUTO: 47 % (ref 37–47)
HDLC SERPL-MCNC: 56 MG/DL
HGB BLD-MCNC: 15.8 G/DL (ref 12–16)
IMM GRANULOCYTES # BLD AUTO: 0.01 K/UL (ref 0–0.11)
IMM GRANULOCYTES NFR BLD AUTO: 0.2 % (ref 0–0.9)
LDLC SERPL CALC-MCNC: 115 MG/DL
LYMPHOCYTES # BLD AUTO: 2 K/UL (ref 1–4.8)
LYMPHOCYTES NFR BLD: 44.6 % (ref 22–41)
MCH RBC QN AUTO: 30.8 PG (ref 27–33)
MCHC RBC AUTO-ENTMCNC: 33.6 G/DL (ref 33.6–35)
MCV RBC AUTO: 91.6 FL (ref 81.4–97.8)
MONOCYTES # BLD AUTO: 0.3 K/UL (ref 0–0.85)
MONOCYTES NFR BLD AUTO: 6.7 % (ref 0–13.4)
NEUTROPHILS # BLD AUTO: 2.02 K/UL (ref 2–7.15)
NEUTROPHILS NFR BLD: 45.2 % (ref 44–72)
NRBC # BLD AUTO: 0 K/UL
NRBC BLD-RTO: 0 /100 WBC
PLATELET # BLD AUTO: 216 K/UL (ref 164–446)
PMV BLD AUTO: 10.2 FL (ref 9–12.9)
POTASSIUM SERPL-SCNC: 4.3 MMOL/L (ref 3.6–5.5)
PROT SERPL-MCNC: 6.4 G/DL (ref 6–8.2)
RBC # BLD AUTO: 5.13 M/UL (ref 4.2–5.4)
SODIUM SERPL-SCNC: 138 MMOL/L (ref 135–145)
TRIGL SERPL-MCNC: 62 MG/DL (ref 0–149)
TSH SERPL DL<=0.005 MIU/L-ACNC: 0.88 UIU/ML (ref 0.38–5.33)
WBC # BLD AUTO: 4.5 K/UL (ref 4.8–10.8)

## 2023-02-10 PROCEDURE — 85025 COMPLETE CBC W/AUTO DIFF WBC: CPT

## 2023-02-10 PROCEDURE — 84443 ASSAY THYROID STIM HORMONE: CPT

## 2023-02-10 PROCEDURE — 36415 COLL VENOUS BLD VENIPUNCTURE: CPT

## 2023-02-10 PROCEDURE — 82306 VITAMIN D 25 HYDROXY: CPT

## 2023-02-10 PROCEDURE — 80053 COMPREHEN METABOLIC PANEL: CPT

## 2023-02-10 PROCEDURE — 80061 LIPID PANEL: CPT

## 2023-02-13 NOTE — ED NOTES
Pt requesting more pain meds, ERP updated and awaiting new orders.   Unable to sign orders at this time. Patient hasn't been seen in family practice since 11/2021. Needs visit to establish care. Please call patient to schedule visit.    To anticoagulation nurse, can cardiology sign orders? Looks like he sees them more often.    Noe Ramirez PA-C on 2/13/2023 at 7:49 AM

## 2023-03-30 ENCOUNTER — APPOINTMENT (OUTPATIENT)
Dept: RADIOLOGY | Facility: MEDICAL CENTER | Age: 38
End: 2023-03-30
Attending: PHYSICAL MEDICINE & REHABILITATION
Payer: COMMERCIAL

## 2023-03-30 DIAGNOSIS — M54.50 LOW BACK PAIN, UNSPECIFIED BACK PAIN LATERALITY, UNSPECIFIED CHRONICITY, UNSPECIFIED WHETHER SCIATICA PRESENT: ICD-10-CM

## 2023-03-30 DIAGNOSIS — M51.36 DEGENERATION OF LUMBAR INTERVERTEBRAL DISC: ICD-10-CM

## 2023-03-30 PROCEDURE — 72148 MRI LUMBAR SPINE W/O DYE: CPT

## 2023-04-05 ENCOUNTER — RESEARCH ENCOUNTER (OUTPATIENT)
Dept: MEDICAL GROUP | Facility: PHYSICIAN GROUP | Age: 38
End: 2023-04-05
Payer: COMMERCIAL

## 2023-04-05 DIAGNOSIS — Z00.6 RESEARCH STUDY PATIENT: ICD-10-CM

## 2023-06-08 LAB
APOB+LDLR+PCSK9 GENE MUT ANL BLD/T: NOT DETECTED
BRCA1+BRCA2 DEL+DUP + FULL MUT ANL BLD/T: NOT DETECTED
MLH1+MSH2+MSH6+PMS2 GN DEL+DUP+FUL M: NOT DETECTED

## 2023-07-01 ENCOUNTER — HOSPITAL ENCOUNTER (EMERGENCY)
Facility: MEDICAL CENTER | Age: 38
End: 2023-07-01
Attending: EMERGENCY MEDICINE
Payer: COMMERCIAL

## 2023-07-01 VITALS
WEIGHT: 155 LBS | HEART RATE: 63 BPM | OXYGEN SATURATION: 96 % | HEIGHT: 64 IN | DIASTOLIC BLOOD PRESSURE: 66 MMHG | SYSTOLIC BLOOD PRESSURE: 110 MMHG | BODY MASS INDEX: 26.46 KG/M2 | TEMPERATURE: 98.2 F | RESPIRATION RATE: 15 BRPM

## 2023-07-01 DIAGNOSIS — M54.50 ACUTE EXACERBATION OF CHRONIC LOW BACK PAIN: ICD-10-CM

## 2023-07-01 DIAGNOSIS — G89.29 ACUTE EXACERBATION OF CHRONIC LOW BACK PAIN: ICD-10-CM

## 2023-07-01 PROCEDURE — A9270 NON-COVERED ITEM OR SERVICE: HCPCS | Mod: UD | Performed by: EMERGENCY MEDICINE

## 2023-07-01 PROCEDURE — 99284 EMERGENCY DEPT VISIT MOD MDM: CPT

## 2023-07-01 PROCEDURE — 700102 HCHG RX REV CODE 250 W/ 637 OVERRIDE(OP): Mod: UD | Performed by: EMERGENCY MEDICINE

## 2023-07-01 PROCEDURE — 700105 HCHG RX REV CODE 258: Mod: JZ,UD | Performed by: EMERGENCY MEDICINE

## 2023-07-01 RX ORDER — MORPHINE SULFATE 15 MG/1
30 TABLET, FILM COATED, EXTENDED RELEASE ORAL ONCE
Status: COMPLETED | OUTPATIENT
Start: 2023-07-01 | End: 2023-07-01

## 2023-07-01 RX ORDER — SODIUM CHLORIDE, SODIUM LACTATE, POTASSIUM CHLORIDE, CALCIUM CHLORIDE 600; 310; 30; 20 MG/100ML; MG/100ML; MG/100ML; MG/100ML
1000 INJECTION, SOLUTION INTRAVENOUS ONCE
Status: COMPLETED | OUTPATIENT
Start: 2023-07-01 | End: 2023-07-01

## 2023-07-01 RX ORDER — OXYCODONE HYDROCHLORIDE 5 MG/1
15 TABLET ORAL ONCE
Status: COMPLETED | OUTPATIENT
Start: 2023-07-01 | End: 2023-07-01

## 2023-07-01 RX ADMIN — MORPHINE SULFATE 30 MG: 15 TABLET, FILM COATED, EXTENDED RELEASE ORAL at 19:26

## 2023-07-01 RX ADMIN — OXYCODONE 15 MG: 5 TABLET ORAL at 16:57

## 2023-07-01 RX ADMIN — SODIUM CHLORIDE, POTASSIUM CHLORIDE, SODIUM LACTATE AND CALCIUM CHLORIDE 1000 ML: 600; 310; 30; 20 INJECTION, SOLUTION INTRAVENOUS at 16:57

## 2023-07-01 ASSESSMENT — PAIN DESCRIPTION - PAIN TYPE
TYPE: ACUTE PAIN

## 2023-07-01 ASSESSMENT — FIBROSIS 4 INDEX: FIB4 SCORE: 0.71

## 2023-07-01 NOTE — ED TRIAGE NOTES
"Chief Complaint   Patient presents with    Back Pain     BIB EMS; from home; patient states she is having a MS flare up; back and facial pain; patient is Aox4 GCS 15; received 75 mcg Fentanyl IV en route; also took her Lyrica tab and Morphine tab before coming here. Denies N/V     /72   Pulse 81   Temp 36.9 °C (98.4 °F) (Temporal)   Resp 17   Ht 1.626 m (5' 4\")   Wt 70.3 kg (155 lb)   LMP 11/20/2019   BMI 26.61 kg/m²     "

## 2023-07-01 NOTE — ED PROVIDER NOTES
"ED Provider Note    CHIEF COMPLAINT  Chief Complaint   Patient presents with    Back Pain     BIB EMS; from home; patient states she is having a MS flare up; back and facial pain; patient is Aox4 GCS 15; received 75 mcg Fentanyl IV en route; also took her Lyrica tab and Morphine tab before coming here. Denies N/V       HPI  Yasemin Talavera is a 37 y.o. female who presents for evaluation of pain.  Patient notes she has pain diffusely but especially in her face and neck after experiencing a stressful event.  She notes she has a recent diagnosis of MS and was told that the 3 lesions in her brain would flare, \"when it becomes hot and inflamed\".  She describes a scenario this afternoon and involving the police, gunshot, and being cuffed in the back of a police car.  She notes her low back started hurting and she was overheating when she was placed in the backseat.  She notes the pain was severe but there was no radiation of the lower extremities, or bowel bladder dysfunction.  She also notes pain to her face, neck, and was quite anxious about the episode.  She did receive 75 mcg of fentanyl in route and EMS noted that she told them she took her morphine 30 mg ER tablet before coming here.  She also took Lyrica as well.  Patient notes that she has not had her immediate release oxycodone 15 mg, which she takes 3 times a day.  EXTERNAL RECORDS REVIEWED  Reviewed PDMP as well as her most recent office visit.  Patient noted to have intervertebral disc degeneration, lumbar radiculopathy and cervical disc degeneration.  ROS  Constitutional: No fevers or chills  Skin: No rashes  HEENT: No sore throat, runny nose  Neck: No neck pain  Chest: No pain or rashes  Pulm: No shortness of breath, cough, wheezing, stridor, or pain with inspiration/expiration  Gastrointestinal: No nausea, vomiting, diarrhea, constipation, bloating, melena, hematochezia or abdominal pain.  Genitourinary: No dysuria or hematuria  Musculoskeletal: Diffuse " muscle pain to back, neck, and face., swelling, or weakness  Neurologic: No sensory or focal motor changes to extremities. No confusion or disorientation.  Psych: Nonsuicidal   Heme: No bleeding or bruising problems.   Immuno: History of MS with painful MS flares.        LIMITATION TO HISTORY   None  OUTSIDE HISTORIAN(S):  None        PAST FAM HISTORY  Family History   Problem Relation Age of Onset    No Known Problems Mother     No Known Problems Father        PAST MEDICAL HISTORY   has a past medical history of Anesthesia, Arrhythmia, ASTHMA, Cold (2017), Colitis, Dysmenorrhea, Fibromyalgia, Heart murmur, Indigestion, Narcotic abuse (HCC), Ovarian cyst, PID (pelvic inflammatory disease), Psychiatric problem, PVC's (premature ventricular contractions), Snoring, and Urinary bladder disorder.    SOCIAL HISTORY  Social History     Tobacco Use    Smoking status: Former     Types: Cigarettes     Quit date: 2007     Years since quittin.5    Smokeless tobacco: Never   Vaping Use    Vaping Use: Never used   Substance and Sexual Activity    Alcohol use: No    Drug use: No     Types: Oral     Comment: hx of opiate addiction, clean since 14    Sexual activity: Not on file       SURGICAL HISTORY   has a past surgical history that includes other; primary c section (2010); hysteroscopy thermal ablation (N/A, 7/10/2017); tubal coagulation laparoscopic bilateral (Bilateral, 7/10/2017); dilation and curettage (N/A, 7/10/2017); hysterectomy laparoscopy (N/A, 2019); salpingectomy (Bilateral, 2019); cystoscopy (N/A, 2019); lap, pillo restrict proc, longitudinal gas* (N/A, 3/15/2021); and luis fernando by laparoscopy (2021).    CURRENT MEDICATIONS  Home Medications       Reviewed by Dee Acharya R.N. (Registered Nurse) on 23 at 1451  Med List Status: <None>     Medication Last Dose Status   acetaminophen (TYLENOL) 500 MG Tab  Active   methylPREDNISolone (MEDROL DOSEPAK) 4 MG Tablet  "Therapy Pack  Active                     ALLERGIES  Allergies   Allergen Reactions    Kdc:Yellow Dye+Ci Pigment Blue 63+Metoclopramide      Involuntary motor tick       PHYSICAL EXAM  VITAL SIGNS: /66   Pulse 63   Temp 36.8 °C (98.2 °F)   Resp 15   Ht 1.626 m (5' 4\")   Wt 70.3 kg (155 lb)   LMP 11/20/2019   SpO2 96%   BMI 26.61 kg/m²    Gen: Appears tired but otherwise alert and cooperative.  HEENT: No signs of trauma, Bilateral external ears normal, Nose normal. Conjunctiva normal, Non-icteric.   Neck:  No tenderness, Supple, No masses  Lymphatic: No cervical lymphadenopathy noted.   Cardiovascular: Regular rate and rhythm, no murmurs.  Capillary refill less than 3 seconds to all extremities, 2+ distal pulses.  Thorax & Lungs: Normal breath sounds, No respiratory distress, No wheezing bilateral chest rise  Abdomen: Bowel sounds normal, Soft, No tenderness, No masses, No pulsatile masses. No Guarding or rebound  Skin: Warm, Dry  Back: Diffuse tenderness  Extremities: Intact distal pulses, No edema  Neurologic: Alert , no facial droop, grossly normal coordination and strength  Psychiatric: Affect pleasant    INITIAL IMPRESSION  Patient arrives for what she describes as an MS flare but does not appear to have any neurologic symptoms or systemic symptoms to suggest the need for imaging or labs.  After a thorough discussion regarding options, including labs and imaging, the patient agreed that symptom control was very reasonable.  She did feel dehydrated and since she was out in the heat for approximately 2 hours today, I feel empiric hydration is reasonable.  IV will be placed and we will give her a liter of LR.  I will also give her her immediate release oxycodone.  I did check PDMP and noted that this is a monthly prescription for her.  She does not appear sedated and I feel it is a safe option.  She also requested her 30 mg extended release morphine as she has not had one this afternoon.  I asked that " she wait until we know that the oxycodone is not going to sedate her too much and then give her some, possibly before she is discharged.  She is comfortable with this plan and appears calm.        HYDRATION: Based on the patient's presentation of Dehydration the patient was given IV fluids. IV Hydration was used because oral hydration was not adequate alone. Upon recheck following hydration, the patient was improving.        COURSE & MEDICAL DECISION MAKING  Pertinent Labs & Imaging studies reviewed. (See chart for details)  ED observation? No  Patient reevaluated and stated that she was comfortable going home but did want her dose of morphine.  She did not appear sedated and I feel it is reasonable as that is her daily prescription.  I suspect that the combination of anxiety and her positioning in the police cruiser caused a flareup of her chronic pain but I do not feel that imaging or laboratory evaluation will benefit the patient or .  She is perfectly comfortable with this plan and states that she will return if her symptoms worsen or change in any way.  I do not feel steroids will benefit her as I do not suspect this is an MS flare.    I have discussed management of the patient with the following physicians and JUJU's: None    Escalation of care considered, and ultimately not performed:blood analysis and diagnostic imaging    Barriers to care at this time, including but not limited to: None.     Decision tools and Rx drugs considered including, but not limited to :Pain Medications PDMP reviewed      Discussion of management with other QHP or appropriate source(s): None  The patient will not drink alcohol nor drive with prescribed medications. The patient will return for worsening symptoms and is stable at the time of discharge. The patient verbalizes understanding and will comply.    FINAL IMPRESSION  1. Acute exacerbation of chronic low back pain        Electronically signed by: Hilario BENOIT  DANIEL Johnson, 7/1/2023 3:59 PM

## 2023-07-02 NOTE — ED NOTES
Pt discharged to home. Discharge paperwork provided. Education provided by ERP.  Pt was given follow up instructions .  Pt verbalized understanding of all instructions for discharge. Patient wheeled via wheelchair, alert and oriented x 4, out of ER with all belongings and steady gait.    Attending Attestation (For Attendings USE Only)...

## 2023-07-02 NOTE — ED NOTES
Bedside report received from previous shift. Assumed patient care. Verified patient identification. Checked on bed, IVF fluids ongoing.

## 2023-08-16 NOTE — ED NOTES
"Pt ambulates to triage  Chief Complaint   Patient presents with   • Other     mouth and throat numbness after drinking enid tea from [employer] in the box, then started drinking water and feels better but L side of mouth feels numb   pt reports it burned \"like after taking a alcoholic shot\"  No rash or hives noted  No oral swelling, speech clear, speaks in full sentences  Pt denies anxiety  Pt asked to wait in lobby, pt updated on triage process and pt asked to inform RN of any changes.         "
All results back  Chart up for re-evaluation  
Blood drawn & sent to lab  
Discharge instructions given, pt verbalized understanding.  A&ox4.  VSS.  Ambulates out of ER with friend.  
ERP at bedside  
ERP at bedside  
Pt ambulates to room.  A&ox4.  Changing into gown.  Chart up for ERP evaluation.  
yes

## 2023-09-06 ENCOUNTER — PHARMACY VISIT (OUTPATIENT)
Dept: PHARMACY | Facility: MEDICAL CENTER | Age: 38
End: 2023-09-06
Payer: COMMERCIAL

## 2023-09-06 PROCEDURE — RXOTC WILLOW AMBULATORY OTC CHARGE: Performed by: PHARMACIST

## 2023-09-06 PROCEDURE — RXMED WILLOW AMBULATORY MEDICATION CHARGE: Performed by: PHYSICAL MEDICINE & REHABILITATION

## 2023-09-06 RX ORDER — OXYCODONE HYDROCHLORIDE 15 MG/1
TABLET ORAL
Qty: 100 TABLET | Refills: 0 | OUTPATIENT
Start: 2023-09-06 | End: 2023-09-29 | Stop reason: SDUPTHER

## 2023-09-11 ENCOUNTER — PHARMACY VISIT (OUTPATIENT)
Dept: PHARMACY | Facility: MEDICAL CENTER | Age: 38
End: 2023-09-11
Payer: COMMERCIAL

## 2023-09-11 PROCEDURE — RXMED WILLOW AMBULATORY MEDICATION CHARGE: Performed by: PHYSICAL MEDICINE & REHABILITATION

## 2023-09-11 RX ORDER — PREGABALIN 200 MG/1
200 CAPSULE ORAL 2 TIMES DAILY PRN
Qty: 60 CAPSULE | Refills: 2 | OUTPATIENT
Start: 2023-09-11 | End: 2023-12-01 | Stop reason: SDUPTHER

## 2023-09-12 PROCEDURE — RXMED WILLOW AMBULATORY MEDICATION CHARGE: Performed by: PHYSICAL MEDICINE & REHABILITATION

## 2023-09-13 ENCOUNTER — PHARMACY VISIT (OUTPATIENT)
Dept: PHARMACY | Facility: MEDICAL CENTER | Age: 38
End: 2023-09-13
Payer: COMMERCIAL

## 2023-09-29 ENCOUNTER — PHARMACY VISIT (OUTPATIENT)
Dept: PHARMACY | Facility: MEDICAL CENTER | Age: 38
End: 2023-09-29
Payer: COMMERCIAL

## 2023-09-29 PROCEDURE — RXMED WILLOW AMBULATORY MEDICATION CHARGE: Performed by: PHYSICAL MEDICINE & REHABILITATION

## 2023-09-29 RX ORDER — OXYCODONE HYDROCHLORIDE 15 MG/1
TABLET ORAL
Qty: 100 TABLET | Refills: 0 | OUTPATIENT
Start: 2023-09-29 | End: 2023-10-25 | Stop reason: SDUPTHER

## 2023-10-06 PROCEDURE — RXMED WILLOW AMBULATORY MEDICATION CHARGE: Performed by: PHYSICAL MEDICINE & REHABILITATION

## 2023-10-09 ENCOUNTER — PHARMACY VISIT (OUTPATIENT)
Dept: PHARMACY | Facility: MEDICAL CENTER | Age: 38
End: 2023-10-09
Payer: COMMERCIAL

## 2023-10-09 ENCOUNTER — HOSPITAL ENCOUNTER (OUTPATIENT)
Facility: MEDICAL CENTER | Age: 38
End: 2023-10-10
Attending: EMERGENCY MEDICINE | Admitting: STUDENT IN AN ORGANIZED HEALTH CARE EDUCATION/TRAINING PROGRAM
Payer: COMMERCIAL

## 2023-10-09 ENCOUNTER — APPOINTMENT (OUTPATIENT)
Dept: RADIOLOGY | Facility: MEDICAL CENTER | Age: 38
End: 2023-10-09
Attending: EMERGENCY MEDICINE
Payer: COMMERCIAL

## 2023-10-09 DIAGNOSIS — K59.03 DRUG-INDUCED CONSTIPATION: ICD-10-CM

## 2023-10-09 DIAGNOSIS — R52 INTRACTABLE PAIN: ICD-10-CM

## 2023-10-09 DIAGNOSIS — R10.84 GENERALIZED ABDOMINAL PAIN: ICD-10-CM

## 2023-10-09 PROBLEM — G35 MULTIPLE SCLEROSIS (HCC): Status: ACTIVE | Noted: 2023-10-09

## 2023-10-09 PROBLEM — K52.89 STERCORAL COLITIS: Status: ACTIVE | Noted: 2019-11-13

## 2023-10-09 PROBLEM — G89.29 PAIN, CHRONIC: Status: ACTIVE | Noted: 2023-10-09

## 2023-10-09 LAB
ALBUMIN SERPL BCP-MCNC: 3.9 G/DL (ref 3.2–4.9)
ALBUMIN/GLOB SERPL: 2.2 G/DL
ALP SERPL-CCNC: 57 U/L (ref 30–99)
ALT SERPL-CCNC: 16 U/L (ref 2–50)
ANION GAP SERPL CALC-SCNC: 8 MMOL/L (ref 7–16)
AST SERPL-CCNC: 23 U/L (ref 12–45)
BASOPHILS # BLD AUTO: 0.4 % (ref 0–1.8)
BASOPHILS # BLD: 0.04 K/UL (ref 0–0.12)
BILIRUB SERPL-MCNC: 0.6 MG/DL (ref 0.1–1.5)
BUN SERPL-MCNC: 10 MG/DL (ref 8–22)
CALCIUM ALBUM COR SERPL-MCNC: 8.6 MG/DL (ref 8.5–10.5)
CALCIUM SERPL-MCNC: 8.5 MG/DL (ref 8.5–10.5)
CHLORIDE SERPL-SCNC: 105 MMOL/L (ref 96–112)
CO2 SERPL-SCNC: 26 MMOL/L (ref 20–33)
CREAT SERPL-MCNC: 0.65 MG/DL (ref 0.5–1.4)
EOSINOPHIL # BLD AUTO: 0.05 K/UL (ref 0–0.51)
EOSINOPHIL NFR BLD: 0.5 % (ref 0–6.9)
ERYTHROCYTE [DISTWIDTH] IN BLOOD BY AUTOMATED COUNT: 41.1 FL (ref 35.9–50)
GFR SERPLBLD CREATININE-BSD FMLA CKD-EPI: 117 ML/MIN/1.73 M 2
GLOBULIN SER CALC-MCNC: 1.8 G/DL (ref 1.9–3.5)
GLUCOSE SERPL-MCNC: 112 MG/DL (ref 65–99)
HCG SERPL QL: NEGATIVE
HCT VFR BLD AUTO: 43.6 % (ref 37–47)
HGB BLD-MCNC: 14.9 G/DL (ref 12–16)
IMM GRANULOCYTES # BLD AUTO: 0.04 K/UL (ref 0–0.11)
IMM GRANULOCYTES NFR BLD AUTO: 0.4 % (ref 0–0.9)
LIPASE SERPL-CCNC: 55 U/L (ref 11–82)
LYMPHOCYTES # BLD AUTO: 1.31 K/UL (ref 1–4.8)
LYMPHOCYTES NFR BLD: 12.8 % (ref 22–41)
MCH RBC QN AUTO: 31.2 PG (ref 27–33)
MCHC RBC AUTO-ENTMCNC: 34.2 G/DL (ref 32.2–35.5)
MCV RBC AUTO: 91.2 FL (ref 81.4–97.8)
MONOCYTES # BLD AUTO: 0.35 K/UL (ref 0–0.85)
MONOCYTES NFR BLD AUTO: 3.4 % (ref 0–13.4)
NEUTROPHILS # BLD AUTO: 8.44 K/UL (ref 1.82–7.42)
NEUTROPHILS NFR BLD: 82.5 % (ref 44–72)
NRBC # BLD AUTO: 0 K/UL
NRBC BLD-RTO: 0 /100 WBC (ref 0–0.2)
PLATELET # BLD AUTO: 155 K/UL (ref 164–446)
PMV BLD AUTO: 10.1 FL (ref 9–12.9)
POTASSIUM SERPL-SCNC: 4.1 MMOL/L (ref 3.6–5.5)
PROT SERPL-MCNC: 5.7 G/DL (ref 6–8.2)
RBC # BLD AUTO: 4.78 M/UL (ref 4.2–5.4)
SODIUM SERPL-SCNC: 139 MMOL/L (ref 135–145)
WBC # BLD AUTO: 10.2 K/UL (ref 4.8–10.8)

## 2023-10-09 PROCEDURE — 80053 COMPREHEN METABOLIC PANEL: CPT

## 2023-10-09 PROCEDURE — 83690 ASSAY OF LIPASE: CPT

## 2023-10-09 PROCEDURE — 96374 THER/PROPH/DIAG INJ IV PUSH: CPT

## 2023-10-09 PROCEDURE — 85025 COMPLETE CBC W/AUTO DIFF WBC: CPT

## 2023-10-09 PROCEDURE — 700102 HCHG RX REV CODE 250 W/ 637 OVERRIDE(OP): Mod: UD | Performed by: STUDENT IN AN ORGANIZED HEALTH CARE EDUCATION/TRAINING PROGRAM

## 2023-10-09 PROCEDURE — 99285 EMERGENCY DEPT VISIT HI MDM: CPT

## 2023-10-09 PROCEDURE — A9270 NON-COVERED ITEM OR SERVICE: HCPCS | Mod: UD | Performed by: STUDENT IN AN ORGANIZED HEALTH CARE EDUCATION/TRAINING PROGRAM

## 2023-10-09 PROCEDURE — 36415 COLL VENOUS BLD VENIPUNCTURE: CPT

## 2023-10-09 PROCEDURE — G0378 HOSPITAL OBSERVATION PER HR: HCPCS

## 2023-10-09 PROCEDURE — 96375 TX/PRO/DX INJ NEW DRUG ADDON: CPT

## 2023-10-09 PROCEDURE — A9270 NON-COVERED ITEM OR SERVICE: HCPCS | Mod: UD | Performed by: EMERGENCY MEDICINE

## 2023-10-09 PROCEDURE — 74177 CT ABD & PELVIS W/CONTRAST: CPT

## 2023-10-09 PROCEDURE — 700105 HCHG RX REV CODE 258: Mod: UD | Performed by: EMERGENCY MEDICINE

## 2023-10-09 PROCEDURE — 96376 TX/PRO/DX INJ SAME DRUG ADON: CPT

## 2023-10-09 PROCEDURE — 700117 HCHG RX CONTRAST REV CODE 255: Mod: UD | Performed by: EMERGENCY MEDICINE

## 2023-10-09 PROCEDURE — 84703 CHORIONIC GONADOTROPIN ASSAY: CPT

## 2023-10-09 PROCEDURE — 99222 1ST HOSP IP/OBS MODERATE 55: CPT | Performed by: STUDENT IN AN ORGANIZED HEALTH CARE EDUCATION/TRAINING PROGRAM

## 2023-10-09 PROCEDURE — 700101 HCHG RX REV CODE 250: Mod: UD | Performed by: STUDENT IN AN ORGANIZED HEALTH CARE EDUCATION/TRAINING PROGRAM

## 2023-10-09 PROCEDURE — 700111 HCHG RX REV CODE 636 W/ 250 OVERRIDE (IP): Mod: JZ,UD | Performed by: EMERGENCY MEDICINE

## 2023-10-09 PROCEDURE — 700102 HCHG RX REV CODE 250 W/ 637 OVERRIDE(OP): Mod: UD | Performed by: EMERGENCY MEDICINE

## 2023-10-09 PROCEDURE — 700111 HCHG RX REV CODE 636 W/ 250 OVERRIDE (IP): Mod: JZ,UD | Performed by: STUDENT IN AN ORGANIZED HEALTH CARE EDUCATION/TRAINING PROGRAM

## 2023-10-09 RX ORDER — ENEMA 19; 7 G/133ML; G/133ML
1 ENEMA RECTAL ONCE
Status: COMPLETED | OUTPATIENT
Start: 2023-10-09 | End: 2023-10-09

## 2023-10-09 RX ORDER — PREGABALIN 100 MG/1
100 CAPSULE ORAL ONCE
Status: DISCONTINUED | OUTPATIENT
Start: 2023-10-09 | End: 2023-10-10 | Stop reason: HOSPADM

## 2023-10-09 RX ORDER — LISDEXAMFETAMINE DIMESYLATE CAPSULES 50 MG/1
50 CAPSULE ORAL EVERY MORNING
Status: DISCONTINUED | OUTPATIENT
Start: 2023-10-09 | End: 2023-10-09

## 2023-10-09 RX ORDER — HYDROMORPHONE HYDROCHLORIDE 1 MG/ML
1 INJECTION, SOLUTION INTRAMUSCULAR; INTRAVENOUS; SUBCUTANEOUS EVERY 4 HOURS PRN
Status: DISCONTINUED | OUTPATIENT
Start: 2023-10-09 | End: 2023-10-10 | Stop reason: HOSPADM

## 2023-10-09 RX ORDER — POLYETHYLENE GLYCOL 3350 17 G/17G
1 POWDER, FOR SOLUTION ORAL
Status: DISCONTINUED | OUTPATIENT
Start: 2023-10-09 | End: 2023-10-10 | Stop reason: HOSPADM

## 2023-10-09 RX ORDER — MORPHINE SULFATE 15 MG/1
30 TABLET ORAL 3 TIMES DAILY
Status: DISCONTINUED | OUTPATIENT
Start: 2023-10-09 | End: 2023-10-10 | Stop reason: HOSPADM

## 2023-10-09 RX ORDER — LISDEXAMFETAMINE DIMESYLATE CAPSULES 50 MG/1
50 CAPSULE ORAL EVERY MORNING
COMMUNITY

## 2023-10-09 RX ORDER — MORPHINE SULFATE 4 MG/ML
4 INJECTION INTRAVENOUS ONCE
Status: COMPLETED | OUTPATIENT
Start: 2023-10-09 | End: 2023-10-09

## 2023-10-09 RX ORDER — MORPHINE SULFATE 15 MG/1
30 TABLET, FILM COATED, EXTENDED RELEASE ORAL ONCE
Status: COMPLETED | OUTPATIENT
Start: 2023-10-09 | End: 2023-10-09

## 2023-10-09 RX ORDER — VORTIOXETINE 5 MG/1
5 TABLET, FILM COATED ORAL DAILY
COMMUNITY

## 2023-10-09 RX ORDER — ONDANSETRON 2 MG/ML
4 INJECTION INTRAMUSCULAR; INTRAVENOUS ONCE
Status: COMPLETED | OUTPATIENT
Start: 2023-10-09 | End: 2023-10-09

## 2023-10-09 RX ORDER — AMOXICILLIN 250 MG
2 CAPSULE ORAL 2 TIMES DAILY
Status: DISCONTINUED | OUTPATIENT
Start: 2023-10-09 | End: 2023-10-10 | Stop reason: HOSPADM

## 2023-10-09 RX ORDER — SODIUM CHLORIDE 9 MG/ML
1000 INJECTION, SOLUTION INTRAVENOUS ONCE
Status: COMPLETED | OUTPATIENT
Start: 2023-10-09 | End: 2023-10-09

## 2023-10-09 RX ORDER — BISACODYL 10 MG
10 SUPPOSITORY, RECTAL RECTAL
Status: DISCONTINUED | OUTPATIENT
Start: 2023-10-09 | End: 2023-10-10 | Stop reason: HOSPADM

## 2023-10-09 RX ORDER — ENOXAPARIN SODIUM 100 MG/ML
40 INJECTION SUBCUTANEOUS DAILY
Status: DISCONTINUED | OUTPATIENT
Start: 2023-10-09 | End: 2023-10-10 | Stop reason: HOSPADM

## 2023-10-09 RX ADMIN — MORPHINE SULFATE 8 MG: 10 INJECTION INTRAVENOUS at 13:34

## 2023-10-09 RX ADMIN — SODIUM PHOSPHATE 133 ML: 7; 19 ENEMA RECTAL at 18:13

## 2023-10-09 RX ADMIN — ONDANSETRON 4 MG: 2 INJECTION INTRAMUSCULAR; INTRAVENOUS at 12:15

## 2023-10-09 RX ADMIN — MORPHINE SULFATE 4 MG: 4 INJECTION, SOLUTION INTRAMUSCULAR; INTRAVENOUS at 12:16

## 2023-10-09 RX ADMIN — DOCUSATE SODIUM 50 MG AND SENNOSIDES 8.6 MG 2 TABLET: 8.6; 5 TABLET, FILM COATED ORAL at 18:13

## 2023-10-09 RX ADMIN — ONDANSETRON 4 MG: 2 INJECTION INTRAMUSCULAR; INTRAVENOUS at 15:00

## 2023-10-09 RX ADMIN — OXYCODONE HYDROCHLORIDE 15 MG: 10 TABLET ORAL at 14:50

## 2023-10-09 RX ADMIN — MORPHINE SULFATE 30 MG: 15 TABLET, FILM COATED, EXTENDED RELEASE ORAL at 14:50

## 2023-10-09 RX ADMIN — IOHEXOL 100 ML: 350 INJECTION, SOLUTION INTRAVENOUS at 16:00

## 2023-10-09 RX ADMIN — SODIUM CHLORIDE 1000 ML: 9 INJECTION, SOLUTION INTRAVENOUS at 12:20

## 2023-10-09 ASSESSMENT — FIBROSIS 4 INDEX: FIB4 SCORE: 0.73

## 2023-10-09 ASSESSMENT — PAIN DESCRIPTION - PAIN TYPE
TYPE: ACUTE PAIN
TYPE: CHRONIC PAIN;ACUTE PAIN

## 2023-10-09 NOTE — ED NOTES
Assist RN: Patient spouse continuously approaching nurses station despite education on call light use, patient placed on bed alarm, educated on call light use and fall prevention measures. RN responded to bed alarm shortly after these instructions were provided. Spouse had dropped bed rails, gotten patient up to bedside commode and reset bed alarm. Spouse instructed not to interfere with medical treatment and to follow instructions. Charge RN notified of need for bedside sitter due to patient and family lack of cooperative. ED tech at bedside as 1:1 sitter.

## 2023-10-09 NOTE — ED NOTES
RELIEF RN    PT IS RESTING IN BED. BREATHING IS EVEN AND UNLABORED, SKIN IS WARM AND DRY, VSS, NAD, WILL CONTINUE TO MONITOR. PT WITH CONTINUED COMPLAINTS OF 10/10 ABD PAIN.     ERP AT BEDSIDE.     1:1 SITTER REMAINS IN DIRECT EYESIGHT OF PATIENT.

## 2023-10-09 NOTE — ED NOTES
Med rec updated and complete. Allergies reviewed.   Confirmed name and date of birth.   Pt denies antibiotic use in last 30 days  Pt denies anticoagulant and antiplatelet use.  Pt denies injections and infusions.        [facility]  [place] = [phone]

## 2023-10-09 NOTE — ED PROVIDER NOTES
ED Provider Note    CHIEF COMPLAINT  Chief Complaint   Patient presents with    Abdominal Pain     BIB EMS from home.   Pt endorsing worsening abd pain with associated N/V.   Pt normally take 30mg morphine and 15mg oxycodone for MS pain       EXTERNAL RECORDS REVIEWED  Inpatient Notes discharge summary July 2021, admission for abdominal pain with subsequent cholecystectomy performed    HPI/ROS  LIMITATION TO HISTORY   Select: : None  OUTSIDE HISTORIAN(S):  Significant other patient's spouse at bedside for discussion history and symptoms    Yasemin Talavera is a 38 y.o. female who presents with acute on chronic abdominal pain.  She states she takes daily pain medication for history of chronic pain, she states secondary to multiple sclerosis, chart also notes history of fibromyalgia.  Patient takes multiple pain medications, daily Lyrica, MS Contin, and oxycodone.  Last bowel movement yesterday small volume, with small, round hard stool.  She has vomited 3 times today with persistent nausea.  She states she is unable to tolerate or oral pain medication and presents here requesting pain medication and evaluation of her abdominal pain.    PAST MEDICAL HISTORY   has a past medical history of Anesthesia, Arrhythmia, ASTHMA, Cold (April 2017), Colitis, Dysmenorrhea, Fibromyalgia, Heart murmur, Indigestion, Narcotic abuse (HCC), Ovarian cyst, PID (pelvic inflammatory disease), Psychiatric problem, PVC's (premature ventricular contractions), Snoring, and Urinary bladder disorder.    SURGICAL HISTORY   has a past surgical history that includes other; primary c section (12/19/2010); hysteroscopy thermal ablation (N/A, 7/10/2017); tubal coagulation laparoscopic bilateral (Bilateral, 7/10/2017); dilation and curettage (N/A, 7/10/2017); hysterectomy laparoscopy (N/A, 11/21/2019); salpingectomy (Bilateral, 11/21/2019); cystoscopy (N/A, 11/21/2019); lap, pillo restrict proc, longitudinal gas* (N/A, 3/15/2021); and luis fernando by  "laparoscopy (2021).    FAMILY HISTORY  Family History   Problem Relation Age of Onset    No Known Problems Mother     No Known Problems Father        SOCIAL HISTORY  Social History     Tobacco Use    Smoking status: Former     Current packs/day: 0.00     Types: Cigarettes     Quit date: 2007     Years since quittin.7    Smokeless tobacco: Never   Vaping Use    Vaping Use: Never used   Substance and Sexual Activity    Alcohol use: No    Drug use: No     Types: Oral     Comment: hx of opiate addiction, clean since 14    Sexual activity: Not on file       CURRENT MEDICATIONS  Home Medications       Reviewed by Asia Borden R.N. (Registered Nurse) on 10/09/23 at 1149  Med List Status: Not Addressed     Medication Last Dose Status   acetaminophen (TYLENOL) 500 MG Tab  Active   methylPREDNISolone (MEDROL DOSEPAK) 4 MG Tablet Therapy Pack  Active   morphine (MS IR) 15 MG tablet  Active   morphine ER (MS CONTIN) 30 MG Tab CR tablet  Active   oxycodone (OXY-IR) 15 MG immediate release tablet  Active   pregabalin (LYRICA) 200 MG capsule  Active                    ALLERGIES  Allergies   Allergen Reactions    Kdc:Yellow Dye+Ci Pigment Blue 63+Metoclopramide      Involuntary motor tick       PHYSICAL EXAM  VITAL SIGNS: /65   Pulse (!) 46   Resp 15   Ht 1.626 m (5' 4\")   Wt 65.8 kg (145 lb)   LMP 2019   SpO2 93%   BMI 24.89 kg/m²    Constitutional: Ill-appearing  ENT: Tacky mucous membranes  Skin: No jaundice  Cardiac: Bradycardic rate and regular rhythm  GI: Mild distention, diffuse tenderness, no peritoneal signs  Musculoskeletal: No flank tenderness  Psychiatric: Dysthymic mood, at times agitated  Neurologic: Speech clear, strength intact    DIAGNOSTIC STUDIES / PROCEDURES      LABS  Results for orders placed or performed during the hospital encounter of 10/09/23   CBC WITH DIFFERENTIAL   Result Value Ref Range    WBC 10.2 4.8 - 10.8 K/uL    RBC 4.78 4.20 - 5.40 M/uL    Hemoglobin " 14.9 12.0 - 16.0 g/dL    Hematocrit 43.6 37.0 - 47.0 %    MCV 91.2 81.4 - 97.8 fL    MCH 31.2 27.0 - 33.0 pg    MCHC 34.2 32.2 - 35.5 g/dL    RDW 41.1 35.9 - 50.0 fL    Platelet Count 155 (L) 164 - 446 K/uL    MPV 10.1 9.0 - 12.9 fL    Neutrophils-Polys 82.50 (H) 44.00 - 72.00 %    Lymphocytes 12.80 (L) 22.00 - 41.00 %    Monocytes 3.40 0.00 - 13.40 %    Eosinophils 0.50 0.00 - 6.90 %    Basophils 0.40 0.00 - 1.80 %    Immature Granulocytes 0.40 0.00 - 0.90 %    Nucleated RBC 0.00 0.00 - 0.20 /100 WBC    Neutrophils (Absolute) 8.44 (H) 1.82 - 7.42 K/uL    Lymphs (Absolute) 1.31 1.00 - 4.80 K/uL    Monos (Absolute) 0.35 0.00 - 0.85 K/uL    Eos (Absolute) 0.05 0.00 - 0.51 K/uL    Baso (Absolute) 0.04 0.00 - 0.12 K/uL    Immature Granulocytes (abs) 0.04 0.00 - 0.11 K/uL    NRBC (Absolute) 0.00 K/uL   COMP METABOLIC PANEL   Result Value Ref Range    Sodium 139 135 - 145 mmol/L    Potassium 4.1 3.6 - 5.5 mmol/L    Chloride 105 96 - 112 mmol/L    Co2 26 20 - 33 mmol/L    Anion Gap 8.0 7.0 - 16.0    Glucose 112 (H) 65 - 99 mg/dL    Bun 10 8 - 22 mg/dL    Creatinine 0.65 0.50 - 1.40 mg/dL    Calcium 8.5 8.5 - 10.5 mg/dL    Correct Calcium 8.6 8.5 - 10.5 mg/dL    AST(SGOT) 23 12 - 45 U/L    ALT(SGPT) 16 2 - 50 U/L    Alkaline Phosphatase 57 30 - 99 U/L    Total Bilirubin 0.6 0.1 - 1.5 mg/dL    Albumin 3.9 3.2 - 4.9 g/dL    Total Protein 5.7 (L) 6.0 - 8.2 g/dL    Globulin 1.8 (L) 1.9 - 3.5 g/dL    A-G Ratio 2.2 g/dL   LIPASE   Result Value Ref Range    Lipase 55 11 - 82 U/L   HCG QUAL SERUM   Result Value Ref Range    Beta-Hcg Qualitative Serum Negative Negative   ESTIMATED GFR   Result Value Ref Range    GFR (CKD-EPI) 117 >60 mL/min/1.73 m 2         RADIOLOGY  I have independently interpreted the diagnostic imaging associated with this visit and am waiting the final reading from the radiologist.   My preliminary interpretation is as follows: CT scan abdomen pelvis negative for free air  Radiologist interpretation:    CT-ABDOMEN-PELVIS WITH   Final Result         1. Large amount of solid stool in the rectum and sigmoid colon, likely impacted.      2. Large amount of liquid stool seen from cecum to the descending colon with associated wall thickening, concerning for stercoral colitis or diarrheal disease.            COURSE & MEDICAL DECISION MAKING    ED Observation Status? No; Patient does not meet criteria for ED Observation.     INITIAL ASSESSMENT, COURSE AND PLAN  Care Narrative: Patient presents with diffuse abdominal pain over the past several days, vomiting today unable to keep her medications in.  She is mildly bradycardic although appears to have history of similar.  There is evidence of dehydration, IV fluids were administered.  Pain extremely difficult to control, secondary to chronic use appears to have fairly strong tolerance to the medication.  Initially given 4 mg of morphine now.  She stated this did not help, then given 8 mg of morphine IV also stated did not help.  At this point however her nausea had improved to where she requested her daily medication, this was reviewed with the pharmacist to confirm these doses.  Patient took 30 mg oral MS Contin, 15 mg oral oxycodone, 100 mg oral Lyrica.  Patient still has difficulty moving secondary to pain.  She has been up and down from the commode multiple times, 1 small bowel movement with small round hard stool, no blood.  CT scan obtained to rule out bowel perforation, obstruction or other.  It appears to show severe obstipation, large amount of liquid stool, radiology stated they were concern for stercoral colitis.  With intractable pain, severe constipation, plan for hospitalization for therapeutics, consider Relistor given her chronic narcotic pain medicine use.  HYDRATION: Based on the patient's presentation of Acute Vomiting and Dehydration the patient was given IV fluids. IV Hydration was used because oral hydration was not adequate alone. Upon recheck  following hydration, the patient was mildly improving.      ADDITIONAL PROBLEM LIST  Abdominal pain, vomiting: Possibly secondary to severe constipation, no evidence of small bowel obstruction or free air on CT scan.  Normal white blood cell count, no fever.    Severe constipation: Difficult to control pain, plan for admission for pain control, further therapeutics to help relieve severe constipation    DISPOSITION AND DISCUSSIONS  I have discussed management of the patient with the following physicians and JUJU's: Admitting hospitalist service    Discussion of management with other QHP or appropriate source(s): Pharmacy regarding dosage of pain medication in the ER, confirmation of dosing        FINAL DIAGNOSIS  1. Intractable pain    2. Generalized abdominal pain    3. Drug-induced constipation           Electronically signed by: Palmer Glover M.D., 10/9/2023 11:56 AM

## 2023-10-09 NOTE — H&P
Hospital Medicine History & Physical Note    Date of Service  10/9/2023    Primary Care Physician  Connor Akins M.D.    Consultants  None    Code Status  Full Code    Chief Complaint  Chief Complaint   Patient presents with    Abdominal Pain     BIB EMS from home.   Pt endorsing worsening abd pain with associated N/V.   Pt normally take 30mg morphine and 15mg oxycodone for MS pain       History of Presenting Illness  Yasemin Talavera is a 38 y.o. female who presented 10/9/2023 with abdominal pain.  Patient reports a sharp abdominal pain for last few days, associate with nausea and vomiting.  She is unsure when she last had a bowel movement.  Patient has a history of multiple sclerosis and chronic pain to which she is on morphine.    In the ER, patient with normal vital signs.  CT abdomen showing large amount of solid stool in the rectum and sigmoid colon, large amount of liquid stool seen from cecum to descending colon with associated wall thickening, concerning for stercoral colitis.    I discussed the plan of care with patient.    Review of Systems  ROS    Past Medical History   has a past medical history of Anesthesia, Arrhythmia, ASTHMA, Cold (April 2017), Colitis, Dysmenorrhea, Fibromyalgia, Heart murmur, Indigestion, Narcotic abuse (HCC), Ovarian cyst, PID (pelvic inflammatory disease), Psychiatric problem, PVC's (premature ventricular contractions), Snoring, and Urinary bladder disorder.    Surgical History   has a past surgical history that includes other; primary c section (12/19/2010); hysteroscopy thermal ablation (N/A, 7/10/2017); tubal coagulation laparoscopic bilateral (Bilateral, 7/10/2017); dilation and curettage (N/A, 7/10/2017); hysterectomy laparoscopy (N/A, 11/21/2019); salpingectomy (Bilateral, 11/21/2019); cystoscopy (N/A, 11/21/2019); pr lap, pillo restrict proc, longitudinal gas* (N/A, 3/15/2021); and luis fernando by laparoscopy (7/7/2021).     Family History  family history includes No Known  Problems in her father and mother.   Family history reviewed with patient. There is no family history that is pertinent to the chief complaint.     Social History   reports that she quit smoking about 16 years ago. Her smoking use included cigarettes. She has never used smokeless tobacco. She reports that she does not drink alcohol and does not use drugs.    Allergies  Allergies   Allergen Reactions    Kdc:Yellow Dye+Ci Pigment Blue 63+Metoclopramide      Involuntary motor tick       Medications  Prior to Admission Medications   Prescriptions Last Dose Informant Patient Reported? Taking?   acetaminophen (TYLENOL) 500 MG Tab   Yes No   Sig: Take 1,000 mg by mouth in the morning, at noon, and at bedtime.   methylPREDNISolone (MEDROL DOSEPAK) 4 MG Tablet Therapy Pack   No No   Sig: Follow schedule on package instructions.   morphine (MS IR) 15 MG tablet   No No   Sig: Take 2 Tablets by mouth 3 times a day.   morphine ER (MS CONTIN) 30 MG Tab CR tablet   No No   Sig: Take 1 tablet by mouth three times daily   oxycodone (OXY-IR) 15 MG immediate release tablet   No No   Sig: Take 1 tablet by mouth 3 to 4 times a day as needed for pain   pregabalin (LYRICA) 200 MG capsule   No No   Sig: Take 1 Capsule by mouth 2 times a day as needed for pain      Facility-Administered Medications: None       Physical Exam  Temp:  [36.9 °C (98.4 °F)] 36.9 °C (98.4 °F)  Pulse:  [46-62] 62  Resp:  [15-16] 16  BP: ()/(53-65) 99/53  SpO2:  [93 %-95 %] 94 %  Blood Pressure: 99/53   Temperature: 36.9 °C (98.4 °F)   Pulse: 62   Respiration: 16   Pulse Oximetry: 94 %       Physical Exam  Constitutional:       Appearance: Normal appearance.      Comments: Sitting on commode, leaning forward   HENT:      Head: Normocephalic.      Nose: Nose normal.      Mouth/Throat:      Mouth: Mucous membranes are moist.   Eyes:      Pupils: Pupils are equal, round, and reactive to light.   Cardiovascular:      Rate and Rhythm: Normal rate and regular  "rhythm.      Pulses: Normal pulses.   Pulmonary:      Effort: Pulmonary effort is normal.      Breath sounds: Normal breath sounds.   Abdominal:      General: Abdomen is flat. Bowel sounds are normal.      Palpations: Abdomen is soft.   Musculoskeletal:         General: Normal range of motion.      Cervical back: Neck supple.   Skin:     General: Skin is warm.   Neurological:      General: No focal deficit present.      Mental Status: She is alert and oriented to person, place, and time. Mental status is at baseline.   Psychiatric:         Mood and Affect: Mood normal.         Behavior: Behavior normal.         Thought Content: Thought content normal.         Judgment: Judgment normal.         Laboratory:  Recent Labs     10/09/23  1159   WBC 10.2   RBC 4.78   HEMOGLOBIN 14.9   HEMATOCRIT 43.6   MCV 91.2   MCH 31.2   MCHC 34.2   RDW 41.1   PLATELETCT 155*   MPV 10.1     Recent Labs     10/09/23  1159   SODIUM 139   POTASSIUM 4.1   CHLORIDE 105   CO2 26   GLUCOSE 112*   BUN 10   CREATININE 0.65   CALCIUM 8.5     Recent Labs     10/09/23  1159   ALTSGPT 16   ASTSGOT 23   ALKPHOSPHAT 57   TBILIRUBIN 0.6   LIPASE 55   GLUCOSE 112*         No results for input(s): \"NTPROBNP\" in the last 72 hours.      No results for input(s): \"TROPONINT\" in the last 72 hours.    Imaging:  CT-ABDOMEN-PELVIS WITH   Final Result         1. Large amount of solid stool in the rectum and sigmoid colon, likely impacted.      2. Large amount of liquid stool seen from cecum to the descending colon with associated wall thickening, concerning for stercoral colitis or diarrheal disease.          no X-Ray or EKG requiring interpretation    Assessment/Plan:  Justification for Admission Status  I anticipate this patient is appropriate for observation status at this time because pt has constipation/stercoral colitis    Patient will need a Med/Surg bed on EMERGENCY service .  The need is secondary to constipation.    * Stercoral colitis  Assessment & " Plan  Spoke with ERP.  Patient with history of multiple sclerosis and chronic pain presents with abdominal pain and constipation.  CAT scan abdomen demonstrating constipation and stercoral colitis.  Patient will be started on bowel regimen, including Fleet enema.  Consider GoLytely should patient not respond.    Pain, chronic  Assessment & Plan  Continue home medications    Multiple sclerosis (HCC)  Assessment & Plan  Chronic        VTE prophylaxis: enoxaparin ppx

## 2023-10-09 NOTE — ASSESSMENT & PLAN NOTE
Spoke with ERP.  Patient with history of multiple sclerosis and chronic pain presents with abdominal pain and constipation.  CAT scan abdomen demonstrating constipation and stercoral colitis.  Patient will be started on bowel regimen, including Fleet enema.  Consider GoLytely should patient not respond.

## 2023-10-09 NOTE — ED NOTES
Pt attempting to manually disimpact rectum. Pt educated multiple times on risks and potential for harm. Pt continued.

## 2023-10-10 ENCOUNTER — PHARMACY VISIT (OUTPATIENT)
Dept: PHARMACY | Facility: MEDICAL CENTER | Age: 38
End: 2023-10-10
Payer: COMMERCIAL

## 2023-10-10 VITALS
HEART RATE: 83 BPM | OXYGEN SATURATION: 94 % | SYSTOLIC BLOOD PRESSURE: 101 MMHG | DIASTOLIC BLOOD PRESSURE: 68 MMHG | WEIGHT: 145 LBS | TEMPERATURE: 98.2 F | BODY MASS INDEX: 24.75 KG/M2 | RESPIRATION RATE: 18 BRPM | HEIGHT: 64 IN

## 2023-10-10 PROCEDURE — 99239 HOSP IP/OBS DSCHRG MGMT >30: CPT | Mod: GC | Performed by: INTERNAL MEDICINE

## 2023-10-10 PROCEDURE — 700102 HCHG RX REV CODE 250 W/ 637 OVERRIDE(OP): Mod: UD | Performed by: STUDENT IN AN ORGANIZED HEALTH CARE EDUCATION/TRAINING PROGRAM

## 2023-10-10 PROCEDURE — G0378 HOSPITAL OBSERVATION PER HR: HCPCS

## 2023-10-10 PROCEDURE — A9270 NON-COVERED ITEM OR SERVICE: HCPCS | Mod: UD | Performed by: STUDENT IN AN ORGANIZED HEALTH CARE EDUCATION/TRAINING PROGRAM

## 2023-10-10 RX ORDER — POLYETHYLENE GLYCOL 3350 17 G/17G
17 POWDER, FOR SOLUTION ORAL DAILY
Qty: 10 EACH | Refills: 0 | Status: SHIPPED | OUTPATIENT
Start: 2023-10-10 | End: 2023-10-20

## 2023-10-10 RX ORDER — ONDANSETRON 2 MG/ML
4 INJECTION INTRAMUSCULAR; INTRAVENOUS EVERY 4 HOURS PRN
Status: DISCONTINUED | OUTPATIENT
Start: 2023-10-10 | End: 2023-10-10 | Stop reason: HOSPADM

## 2023-10-10 RX ORDER — SENNA AND DOCUSATE SODIUM 50; 8.6 MG/1; MG/1
1 TABLET, FILM COATED ORAL DAILY
Qty: 10 TABLET | Refills: 0 | Status: SHIPPED | OUTPATIENT
Start: 2023-10-10 | End: 2023-10-20

## 2023-10-10 RX ADMIN — MORPHINE SULFATE 30 MG: 15 TABLET ORAL at 08:59

## 2023-10-10 ASSESSMENT — PATIENT HEALTH QUESTIONNAIRE - PHQ9
SUM OF ALL RESPONSES TO PHQ9 QUESTIONS 1 AND 2: 0
2. FEELING DOWN, DEPRESSED, IRRITABLE, OR HOPELESS: NOT AT ALL
1. LITTLE INTEREST OR PLEASURE IN DOING THINGS: NOT AT ALL

## 2023-10-10 ASSESSMENT — LIFESTYLE VARIABLES
TOTAL SCORE: 0
EVER FELT BAD OR GUILTY ABOUT YOUR DRINKING: NO
TOTAL SCORE: 0
DOES PATIENT WANT TO STOP DRINKING: NO
HOW MANY TIMES IN THE PAST YEAR HAVE YOU HAD 5 OR MORE DRINKS IN A DAY: 0
EVER HAD A DRINK FIRST THING IN THE MORNING TO STEADY YOUR NERVES TO GET RID OF A HANGOVER: NO
HAVE YOU EVER FELT YOU SHOULD CUT DOWN ON YOUR DRINKING: NO
ALCOHOL_USE: NO
AVERAGE NUMBER OF DAYS PER WEEK YOU HAVE A DRINK CONTAINING ALCOHOL: 0
CONSUMPTION TOTAL: INCOMPLETE
HAVE PEOPLE ANNOYED YOU BY CRITICIZING YOUR DRINKING: NO
ON A TYPICAL DAY WHEN YOU DRINK ALCOHOL HOW MANY DRINKS DO YOU HAVE: 0
CONSUMPTION TOTAL: INCOMPLETE
TOTAL SCORE: 0

## 2023-10-10 ASSESSMENT — FIBROSIS 4 INDEX: FIB4 SCORE: 1.34

## 2023-10-10 ASSESSMENT — PAIN DESCRIPTION - PAIN TYPE: TYPE: CHRONIC PAIN;ACUTE PAIN

## 2023-10-10 NOTE — PROGRESS NOTES
Report received from MATY Aguilar.  Assumed care of patient.  Assessment complete.  Plan of care gone over with the patient and all concerns addressed.  Patient resting in bed.  Patient A & O  x 4.  No apparent signs of distress.  Safety precautions in place.  Patient educated to call for assistance.  Hourly rounding in place.

## 2023-10-10 NOTE — HOSPITAL COURSE
Ms. Yasemin Talavera is a 38 y.o. female with a PMHx of asthma, colitis, narcotic abuse, ovarian cyst, PID, psychiatric disorder, multiple sclerosis, who presented 10/9/2023 with abdominal pain.      Patient reports a sharp abdominal pain for last few days, associate with nausea and vomiting.  She is unsure when she last had a bowel movement.  Patient has a history of multiple sclerosis and chronic pain to which she is on morphine.     In the ER, patient with normal vital signs.  CT abdomen showing large amount of solid stool in the rectum and sigmoid colon, large amount of liquid stool seen from cecum to descending colon with associated wall thickening, concerning for stercoral colitis.    During his hospitalization, patient was given bowel protocol of which she had large amount of bowel movements during this hospital stay.  Discussed with patient discussing with her pain management physician reducing narcotics use.  Will prescribe patient stool softeners and MiraLAX to help with daily bowel movements at home well using narcotics.  Patient to resume all other home medications.  Patient to follow-up with her PCP for management of care.  All questions and concerns answered prior to being discharged.  Patient discharged home.

## 2023-10-10 NOTE — PROGRESS NOTES
Pt AOx4, unsteady gait,  drowsy and noncompliant with staff instructions, getting out of bed, and continues to introduce fingers to buttocks. Enema given refuses other meds.  at bedside

## 2023-10-10 NOTE — DISCHARGE SUMMARY
Discharge Summary    CHIEF COMPLAINT ON ADMISSION  Chief Complaint   Patient presents with    Abdominal Pain     BIB EMS from home.   Pt endorsing worsening abd pain with associated N/V.   Pt normally take 30mg morphine and 15mg oxycodone for MS pain       Reason for Admission  ems     Admission Date  10/9/2023    CODE STATUS  Full Code    HPI & HOSPITAL COURSE    Ms. Yasemin Talavera is a 38 y.o. female with a PMHx of asthma, colitis, narcotic abuse, ovarian cyst, PID, psychiatric disorder, multiple sclerosis, who presented 10/9/2023 with abdominal pain.      Patient reports a sharp abdominal pain for last few days, associate with nausea and vomiting.  She is unsure when she last had a bowel movement.  Patient has a history of multiple sclerosis and chronic pain to which she is on morphine.     In the ER, patient with normal vital signs.  CT abdomen showing large amount of solid stool in the rectum and sigmoid colon, large amount of liquid stool seen from cecum to descending colon with associated wall thickening, concerning for stercoral colitis.    During his hospitalization, patient was given bowel protocol of which she had large amount of bowel movements during this hospital stay.  Discussed with patient discussing with her pain management physician reducing narcotics use.  Will prescribe patient stool softeners and MiraLAX to help with daily bowel movements at home well using narcotics.  Patient to resume all other home medications.  Patient to follow-up with her PCP for management of care.  All questions and concerns answered prior to being discharged.  Patient discharged home.    Therefore, she is discharged in good and stable condition to home with close outpatient follow-up.    The patient recovered much more quickly than anticipated on admission.    Discharge Date  10/10/23      FOLLOW UP ITEMS POST DISCHARGE  Please call [phone] to schedule PCP appointment for patient.    Required specialty  appointments include:       Discharge Instructions per MICHAEL Morris    -Follow-up with PCP s/p hospitalization  -Continue to follow with pain management physician  -Utilize over-the-counter stool softeners and laxative if no bowel movement within 3 days  -Continue all home medications    DIET: As tolerated    ACTIVITY: As tolerated    DIAGNOSIS: Abdominal pain due to constipation    Return to ER if symptoms persist, chest pain, palpitations, shortness of breath, numbness, tingling, weakness, and high fevers.      DISCHARGE DIAGNOSES  Principal Problem:    Stercoral colitis (POA: Unknown)  Active Problems:    Multiple sclerosis (HCC) (POA: Unknown)    Pain, chronic (POA: Unknown)  Resolved Problems:    * No resolved hospital problems. *      FOLLOW UP    Connor Akins M.D.  [address] [place] [id number] [phone]    Schedule an appointment as soon as possible for a visit in 1 week(s)        MEDICATIONS ON DISCHARGE     Medication List        START taking these medications        Instructions   polyethylene glycol/lytes 17 g Pack  Commonly known as: Miralax   Take 1 Packet by mouth every day for 10 days.  Dose: 17 g     sennosides-docusate sodium 8.6-50 MG tablet  Commonly known as: Senokot-S   Take 1 Tablet by mouth every day for 10 days.  Dose: 1 Tablet            CONTINUE taking these medications        Instructions   * morphine 15 MG tablet  Commonly known as: MS IR   Doctor's comments: to replace the 30 mg; m51.36 30 day supply; per MD's office, ok to fill early today (instead of 9/17 due) due to teeth infection but this will be the last time - II 9/12/23  Take 2 Tablets by mouth 3 times a day.  Dose: 30 mg     * morphine ER 30 MG Tbcr tablet  Commonly known as: MS Contin   Doctor's comments: m51.36 30 day supply  Take 1 tablet by mouth three times daily     oxycodone 15 MG immediate release tablet  Commonly known as: Oxy-IR   Take 1 tablet by mouth 3 to  4 times a day as needed for pain     pregabalin 200 MG capsule  Commonly known as: Lyrica   Take 1 Capsule by mouth 2 times a day as needed for pain  Dose: 200 mg     Trintellix 5 MG Tabs  Generic drug: Vortioxetine HBr   Take 5 mg by mouth every day.  Dose: 5 mg     Vyvanse 50 MG capsule  Generic drug: lisdexamfetamine   Take 50 mg by mouth every morning.  Dose: 50 mg           * This list has 2 medication(s) that are the same as other medications prescribed for you. Read the directions carefully, and ask your doctor or other care provider to review them with you.                  Allergies  Allergies   Allergen Reactions    Kdc:Yellow Dye+Ci Pigment Blue 63+Metoclopramide      Involuntary motor tick       DIET  Orders Placed This Encounter   Procedures    Diet Order Diet: Regular     Standing Status:   Standing     Number of Occurrences:   1     Order Specific Question:   Diet:     Answer:   Regular [1]       ACTIVITY  As tolerated.  Weight bearing as tolerated    CONSULTATIONS  NONE    PROCEDURES  NONE    IMAGING    CT-ABDOMEN-PELVIS WITH   Final Result         1. Large amount of solid stool in the rectum and sigmoid colon, likely impacted.      2. Large amount of liquid stool seen from cecum to the descending colon with associated wall thickening, concerning for stercoral colitis or diarrheal disease.            LABORATORY  Lab Results   Component Value Date    SODIUM 139 10/09/2023    POTASSIUM 4.1 10/09/2023    CHLORIDE 105 10/09/2023    CO2 26 10/09/2023    GLUCOSE 112 (H) 10/09/2023    BUN 10 10/09/2023    CREATININE 0.65 10/09/2023    CREATININE 0.8 05/05/2009        Lab Results   Component Value Date    WBC 10.2 10/09/2023    HEMOGLOBIN 14.9 10/09/2023    HEMATOCRIT 43.6 10/09/2023    PLATELETCT 155 (L) 10/09/2023

## 2023-10-10 NOTE — ED NOTES
Transport @ bedside to take pt to floor. NAD. VSS. Respirations equal and unlabored. All belongings with pt.

## 2023-10-10 NOTE — DISCHARGE INSTRUCTIONS
FOLLOW UP ITEMS POST DISCHARGE  Please call [phone] to schedule PCP appointment for patient.    Required specialty appointments include:       Discharge Instructions per MICHAEL Morris    -Follow-up with PCP s/p hospitalization  -Continue to follow with pain management physician  -Utilize over-the-counter stool softeners and laxative if no bowel movement within 3 days  -Continue all home medications    DIET: As tolerated    ACTIVITY: As tolerated    DIAGNOSIS: Abdominal pain due to constipation    Return to ER if symptoms persist, chest pain, palpitations, shortness of breath, numbness, tingling, weakness, and high fevers.    Colitis    Colitis is a condition in which the colon is inflamed. It can cause diarrhea, blood in the stool, and abdominal pain. Colitis can last a short time (be acute), or it may last a long time (become chronic).  What are the causes?  This condition may be caused by:  Infections from viruses or bacteria.  A reaction to medicine.  Certain autoimmune diseases, such as Crohn's disease or ulcerative colitis.  Radiation treatment.  Decreased blood flow to the bowel (ischemia).  What are the signs or symptoms?  Symptoms of this condition include:  Diarrhea, blood in the stool, or black, tarry stool.  Pain in the joints or abdominal pain.  Fever or fatigue.  Vomiting.  Weight loss.  Bloating.  Having fewer bowel movements than usual.  A strong and sudden urge to have a bowel movement.  Feeling like the bowel is not empty after a bowel movement.  How is this diagnosed?  This condition may be diagnosed based on a stool test and a blood test. You may also have other tests, such as:  X-rays.  CT scan.  Colonoscopy.  Endoscopy.  Biopsy.  How is this treated?  Treatment for this condition depends on the cause. This condition may be treated with:  Steps to rest the bowel, such as not eating or drinking for a period of time.  Fluids that are given through an IV.  Medicine for  pain and diarrhea.  Antibiotic medicines.  Cortisone medicines.  Surgery.  Follow these instructions at home:  Eating and drinking    Follow instructions from your health care provider about eating or drinking restrictions.  Drink enough fluid to keep your urine pale yellow.  Work with a dietitian to determine whether certain foods cause your condition to flare up.  Avoid foods or drinks that cause flare-ups.  Eat a well-balanced diet.  General instructions  If you were prescribed an antibiotic medicine, take it as told by your health care provider. Do not stop taking the antibiotic even if you start to feel better.  Take over-the-counter and prescription medicines only as told by your health care provider.  Keep all follow-up visits. This is important.  Contact a health care provider if:  Your symptoms do not go away.  You develop new symptoms.  Get help right away if:  You have a fever that does not go away with treatment.  You develop chills.  You have extreme weakness, fainting, or dehydration.  You vomit repeatedly.  You develop severe pain in your abdomen.  You pass bloody or tarry stool.  Summary  Colitis is a condition in which the colon is inflamed. Colitis can last a short time (be acute), or it may last a long time (become chronic).  Treatment for this condition depends on the cause and may include resting the bowel, taking medicines, or having surgery.  If you were prescribed an antibiotic medicine, take it as told by your health care provider. Do not stop taking the antibiotic even if you start to feel better.  Get help right away if you develop severe pain in your abdomen.  Keep all follow-up visits. This is important.  This information is not intended to replace advice given to you by your health care provider. Make sure you discuss any questions you have with your health care provider.  Document Revised: 08/24/2021 Document Reviewed: 08/24/2021  Elsevier Patient Education © 2023 [employer]

## 2023-10-10 NOTE — PROGRESS NOTES
4 Eyes Skin Assessment Completed by MATY Aguilar and MATY Charlton.    Head WDL  Ears WDL  Nose WDL  Mouth WDL  Neck WDL  Breast/Chest Scar  Shoulder Blades WDL  Spine WDL  (R) Arm/Elbow/Hand WDL  (L) Arm/Elbow/Hand WDL  Abdomen WDL  Groin WDL  Scrotum/Coccyx/Buttocks WDL  (R) Leg WDL  (L) Leg WDL  (R) Heel/Foot/Toe WDL  (L) Heel/Foot/Toe WDL          Devices In Places Pulse Ox      Interventions In Place N/A    Possible Skin Injury No    Pictures Uploaded Into Epic N/A  Wound Consult Placed N/A  RN Wound Prevention Protocol Ordered No

## 2023-10-10 NOTE — CARE PLAN
The patient is Stable - Low risk of patient condition declining or worsening    Shift Goals  Clinical Goals: have BM  Patient Goals: feel better  Family Goals: not at bedside    Progress made toward(s) clinical / shift goals:    Problem: Pain - Standard  Goal: Alleviation of pain or a reduction in pain to the patient’s comfort goal  Description: Target End Date:  10/10/2023    Document on Vitals flowsheet    1.  Document pain using the appropriate pain scale per order or unit policy  2.  Educate and implement non-pharmacologic comfort measures (i.e. relaxation, distraction, massage, cold/heat therapy, etc.)  3.  Pain management medications as ordered  4.  Reassess pain after pain med administration per policy  5.  If opiods administered assess patient's response to pain medication is appropriate per POSS sedation scale  6.  Follow pain management plan developed in collaboration with patient and interdisciplinary team (including palliative care or pain specialists if applicable)  Outcome: Progressing     Problem: Knowledge Deficit - Standard  Goal: Patient and family/care givers will demonstrate understanding of plan of care, disease process/condition, diagnostic tests and medications  Description: Target End Date:  10/10/2023    Document in Patient Education    1.  Patient and family/caregiver oriented to unit, equipment, visitation policy and means for communicating concern  2.  Complete/review Learning Assessment  3.  Assess knowledge level of disease process/condition, treatment plan, diagnostic tests and medications  4.  Explain disease process/condition, treatment plan, diagnostic tests and medications  Outcome: Progressing

## 2023-10-10 NOTE — PROGRESS NOTES
Patient noncompliant with instructions. Continues to try and get out of bed.       Addendum:  Patient on hands and knees trying to have bowel movement. Refusing to use bedpan. Did agree to have brief placed on her.

## 2023-10-11 ENCOUNTER — PHARMACY VISIT (OUTPATIENT)
Dept: PHARMACY | Facility: MEDICAL CENTER | Age: 38
End: 2023-10-11
Payer: COMMERCIAL

## 2023-10-25 PROCEDURE — RXMED WILLOW AMBULATORY MEDICATION CHARGE: Performed by: PHYSICAL MEDICINE & REHABILITATION

## 2023-10-27 ENCOUNTER — PHARMACY VISIT (OUTPATIENT)
Dept: PHARMACY | Facility: MEDICAL CENTER | Age: 38
End: 2023-10-27
Payer: COMMERCIAL

## 2023-11-03 ENCOUNTER — PHARMACY VISIT (OUTPATIENT)
Dept: PHARMACY | Facility: MEDICAL CENTER | Age: 38
End: 2023-11-03
Payer: COMMERCIAL

## 2023-11-03 PROCEDURE — RXMED WILLOW AMBULATORY MEDICATION CHARGE: Performed by: PHYSICAL MEDICINE & REHABILITATION

## 2023-11-03 RX ORDER — MORPHINE SULFATE 15 MG/1
15 TABLET ORAL 3 TIMES DAILY
Qty: 90 TABLET | Refills: 0 | OUTPATIENT
Start: 2023-11-03

## 2023-11-03 RX ORDER — MORPHINE SULFATE 15 MG/1
TABLET, FILM COATED, EXTENDED RELEASE ORAL
Qty: 90 TABLET | Refills: 0 | OUTPATIENT
Start: 2023-11-03 | End: 2023-11-03 | Stop reason: CLARIF

## 2023-11-10 ENCOUNTER — PHARMACY VISIT (OUTPATIENT)
Dept: PHARMACY | Facility: MEDICAL CENTER | Age: 38
End: 2023-11-10
Payer: COMMERCIAL

## 2023-11-10 PROCEDURE — RXMED WILLOW AMBULATORY MEDICATION CHARGE: Performed by: PHYSICAL MEDICINE & REHABILITATION

## 2023-11-14 NOTE — ED TRIAGE NOTES
Chief Complaint   Patient presents with    Abdominal Pain     BIB EMS from home.   Pt endorsing worsening abd pain with associated N/V.   Pt normally take 30mg morphine and 15mg oxycodone for MS pain     From EMS  250mL NS  100 Fent  4 zofran   
none

## 2023-11-15 ENCOUNTER — PHARMACY VISIT (OUTPATIENT)
Dept: PHARMACY | Facility: MEDICAL CENTER | Age: 38
End: 2023-11-15
Payer: COMMERCIAL

## 2023-11-15 PROCEDURE — RXMED WILLOW AMBULATORY MEDICATION CHARGE: Performed by: PHYSICAL MEDICINE & REHABILITATION

## 2023-11-15 RX ORDER — OXYCODONE HYDROCHLORIDE 10 MG/1
TABLET ORAL
Qty: 100 TABLET | Refills: 0 | OUTPATIENT
Start: 2023-11-15 | End: 2023-12-01 | Stop reason: SDUPTHER

## 2023-11-15 RX ORDER — METHADONE HYDROCHLORIDE 5 MG/1
TABLET ORAL
Qty: 90 TABLET | Refills: 0 | OUTPATIENT
Start: 2023-11-15

## 2023-11-17 ENCOUNTER — PHARMACY VISIT (OUTPATIENT)
Dept: PHARMACY | Facility: MEDICAL CENTER | Age: 38
End: 2023-11-17
Payer: COMMERCIAL

## 2023-11-17 PROCEDURE — RXMED WILLOW AMBULATORY MEDICATION CHARGE: Performed by: PHYSICAL MEDICINE & REHABILITATION

## 2023-12-05 ENCOUNTER — PHARMACY VISIT (OUTPATIENT)
Dept: PHARMACY | Facility: MEDICAL CENTER | Age: 38
End: 2023-12-05
Payer: COMMERCIAL

## 2023-12-05 PROCEDURE — RXMED WILLOW AMBULATORY MEDICATION CHARGE: Performed by: PHYSICAL MEDICINE & REHABILITATION

## 2023-12-05 RX ORDER — METHADONE HYDROCHLORIDE 10 MG/1
TABLET ORAL
Qty: 60 TABLET | Refills: 0 | OUTPATIENT
Start: 2023-12-05 | End: 2024-01-11 | Stop reason: SDUPTHER

## 2023-12-07 PROCEDURE — RXMED WILLOW AMBULATORY MEDICATION CHARGE: Performed by: PHYSICAL MEDICINE & REHABILITATION

## 2023-12-08 ENCOUNTER — PHARMACY VISIT (OUTPATIENT)
Dept: PHARMACY | Facility: MEDICAL CENTER | Age: 38
End: 2023-12-08
Payer: COMMERCIAL

## 2023-12-11 ENCOUNTER — PHARMACY VISIT (OUTPATIENT)
Dept: PHARMACY | Facility: MEDICAL CENTER | Age: 38
End: 2023-12-11
Payer: COMMERCIAL

## 2023-12-11 PROCEDURE — RXMED WILLOW AMBULATORY MEDICATION CHARGE: Performed by: PHYSICAL MEDICINE & REHABILITATION

## 2023-12-22 ENCOUNTER — PHARMACY VISIT (OUTPATIENT)
Dept: PHARMACY | Facility: MEDICAL CENTER | Age: 38
End: 2023-12-22
Payer: COMMERCIAL

## 2023-12-22 PROCEDURE — RXMED WILLOW AMBULATORY MEDICATION CHARGE: Performed by: PHYSICAL MEDICINE & REHABILITATION

## 2023-12-22 RX ORDER — MORPHINE SULFATE 15 MG/1
TABLET, FILM COATED, EXTENDED RELEASE ORAL
Qty: 60 TABLET | Refills: 0 | OUTPATIENT
Start: 2023-12-22

## 2023-12-22 RX ORDER — PREGABALIN 150 MG/1
CAPSULE ORAL
Qty: 60 CAPSULE | Refills: 2 | OUTPATIENT
Start: 2023-12-22

## 2024-01-04 ENCOUNTER — PHARMACY VISIT (OUTPATIENT)
Dept: PHARMACY | Facility: MEDICAL CENTER | Age: 39
End: 2024-01-04
Payer: COMMERCIAL

## 2024-01-04 PROCEDURE — RXMED WILLOW AMBULATORY MEDICATION CHARGE: Performed by: PHYSICAL MEDICINE & REHABILITATION

## 2024-01-11 ENCOUNTER — PHARMACY VISIT (OUTPATIENT)
Dept: PHARMACY | Facility: MEDICAL CENTER | Age: 39
End: 2024-01-11
Payer: COMMERCIAL

## 2024-01-11 PROCEDURE — RXMED WILLOW AMBULATORY MEDICATION CHARGE: Performed by: PHYSICAL MEDICINE & REHABILITATION

## 2024-01-11 RX ORDER — METHADONE HYDROCHLORIDE 10 MG/1
10 TABLET ORAL 3 TIMES DAILY
Qty: 90 TABLET | Refills: 0 | OUTPATIENT
Start: 2024-01-11 | End: 2024-02-08 | Stop reason: SDUPTHER

## 2024-01-17 PROCEDURE — RXMED WILLOW AMBULATORY MEDICATION CHARGE: Performed by: PHYSICAL MEDICINE & REHABILITATION

## 2024-01-18 ENCOUNTER — PHARMACY VISIT (OUTPATIENT)
Dept: PHARMACY | Facility: MEDICAL CENTER | Age: 39
End: 2024-01-18
Payer: COMMERCIAL

## 2024-01-18 PROCEDURE — RXOTC WILLOW AMBULATORY OTC CHARGE

## 2024-02-08 ENCOUNTER — PHARMACY VISIT (OUTPATIENT)
Dept: PHARMACY | Facility: MEDICAL CENTER | Age: 39
End: 2024-02-08
Payer: COMMERCIAL

## 2024-02-08 PROCEDURE — RXMED WILLOW AMBULATORY MEDICATION CHARGE: Performed by: PHYSICAL MEDICINE & REHABILITATION

## 2024-02-08 RX ORDER — PREGABALIN 150 MG/1
CAPSULE ORAL
Qty: 60 CAPSULE | Refills: 2 | OUTPATIENT
Start: 2024-02-07 | End: 2024-02-09

## 2024-02-08 RX ORDER — METHADONE HYDROCHLORIDE 10 MG/1
TABLET ORAL
Qty: 90 TABLET | Refills: 0 | OUTPATIENT
Start: 2024-02-07

## 2024-02-09 ENCOUNTER — PHARMACY VISIT (OUTPATIENT)
Dept: PHARMACY | Facility: MEDICAL CENTER | Age: 39
End: 2024-02-09
Payer: COMMERCIAL

## 2024-02-09 PROCEDURE — RXMED WILLOW AMBULATORY MEDICATION CHARGE: Performed by: PHYSICAL MEDICINE & REHABILITATION

## 2024-02-09 RX ORDER — PREGABALIN 100 MG/1
100 CAPSULE ORAL 2 TIMES DAILY PRN
Qty: 60 CAPSULE | Refills: 2 | OUTPATIENT
Start: 2024-02-09

## 2024-02-29 ENCOUNTER — PHARMACY VISIT (OUTPATIENT)
Dept: PHARMACY | Facility: MEDICAL CENTER | Age: 39
End: 2024-02-29
Payer: COMMERCIAL

## 2024-02-29 PROCEDURE — RXMED WILLOW AMBULATORY MEDICATION CHARGE: Performed by: PHYSICAL MEDICINE & REHABILITATION

## 2024-03-08 PROCEDURE — RXMED WILLOW AMBULATORY MEDICATION CHARGE: Performed by: FAMILY MEDICINE

## 2024-03-12 ENCOUNTER — PHARMACY VISIT (OUTPATIENT)
Dept: PHARMACY | Facility: MEDICAL CENTER | Age: 39
End: 2024-03-12
Payer: COMMERCIAL

## 2024-03-12 PROCEDURE — RXMED WILLOW AMBULATORY MEDICATION CHARGE: Performed by: FAMILY MEDICINE

## 2024-03-12 RX ORDER — OXYCODONE HYDROCHLORIDE 10 MG/1
TABLET ORAL
Qty: 120 TABLET | Refills: 0 | OUTPATIENT
Start: 2024-03-12

## 2024-06-11 ENCOUNTER — HOSPITAL ENCOUNTER (OUTPATIENT)
Facility: MEDICAL CENTER | Age: 39
End: 2024-06-11
Attending: STUDENT IN AN ORGANIZED HEALTH CARE EDUCATION/TRAINING PROGRAM
Payer: COMMERCIAL

## 2024-06-11 ENCOUNTER — HOSPITAL ENCOUNTER (OUTPATIENT)
Dept: LAB | Facility: MEDICAL CENTER | Age: 39
End: 2024-06-11
Attending: STUDENT IN AN ORGANIZED HEALTH CARE EDUCATION/TRAINING PROGRAM
Payer: COMMERCIAL

## 2024-06-11 ENCOUNTER — OFFICE VISIT (OUTPATIENT)
Dept: MEDICAL GROUP | Facility: MEDICAL CENTER | Age: 39
End: 2024-06-11
Payer: COMMERCIAL

## 2024-06-11 VITALS
OXYGEN SATURATION: 96 % | HEIGHT: 64 IN | DIASTOLIC BLOOD PRESSURE: 76 MMHG | HEART RATE: 82 BPM | BODY MASS INDEX: 29.36 KG/M2 | SYSTOLIC BLOOD PRESSURE: 112 MMHG | WEIGHT: 171.96 LBS | TEMPERATURE: 97.9 F

## 2024-06-11 DIAGNOSIS — F90.0 ATTENTION DEFICIT HYPERACTIVITY DISORDER, PREDOMINANTLY INATTENTIVE TYPE: ICD-10-CM

## 2024-06-11 DIAGNOSIS — Z90.3 S/P GASTRIC SLEEVE PROCEDURE: ICD-10-CM

## 2024-06-11 DIAGNOSIS — M79.7 FIBROMYALGIA: ICD-10-CM

## 2024-06-11 DIAGNOSIS — R23.2 HOT FLASHES: ICD-10-CM

## 2024-06-11 DIAGNOSIS — F32.A DEPRESSION, UNSPECIFIED DEPRESSION TYPE: ICD-10-CM

## 2024-06-11 DIAGNOSIS — R29.90 NEUROLOGICAL SYMPTOMS: ICD-10-CM

## 2024-06-11 PROBLEM — N92.0 MENORRHAGIA: Status: RESOLVED | Noted: 2017-07-10 | Resolved: 2024-06-11

## 2024-06-11 PROBLEM — K52.89 STERCORAL COLITIS: Status: RESOLVED | Noted: 2019-11-13 | Resolved: 2024-06-11

## 2024-06-11 PROBLEM — N80.9 ENDOMETRIOSIS: Status: RESOLVED | Noted: 2019-11-22 | Resolved: 2024-06-11

## 2024-06-11 PROBLEM — N83.202 CYST OF LEFT OVARY: Status: ACTIVE | Noted: 2022-04-11

## 2024-06-11 PROBLEM — N94.6 DYSMENORRHEA: Status: RESOLVED | Noted: 2019-11-22 | Resolved: 2024-06-11

## 2024-06-11 PROBLEM — R10.11 RIGHT UPPER QUADRANT PAIN: Status: RESOLVED | Noted: 2021-07-06 | Resolved: 2024-06-11

## 2024-06-11 PROBLEM — R82.71 ASYMPTOMATIC BACTERIURIA: Status: RESOLVED | Noted: 2021-07-06 | Resolved: 2024-06-11

## 2024-06-11 LAB
25(OH)D3 SERPL-MCNC: 35 NG/ML (ref 30–100)
ALBUMIN SERPL BCP-MCNC: 4 G/DL (ref 3.2–4.9)
ALBUMIN/GLOB SERPL: 1.5 G/DL
ALP SERPL-CCNC: 68 U/L (ref 30–99)
ALT SERPL-CCNC: 12 U/L (ref 2–50)
ANION GAP SERPL CALC-SCNC: 10 MMOL/L (ref 7–16)
AST SERPL-CCNC: 24 U/L (ref 12–45)
BILIRUB SERPL-MCNC: 0.9 MG/DL (ref 0.1–1.5)
BUN SERPL-MCNC: 8 MG/DL (ref 8–22)
CALCIUM ALBUM COR SERPL-MCNC: 9.6 MG/DL (ref 8.5–10.5)
CALCIUM SERPL-MCNC: 9.6 MG/DL (ref 8.5–10.5)
CHLORIDE SERPL-SCNC: 103 MMOL/L (ref 96–112)
CHOLEST SERPL-MCNC: 181 MG/DL (ref 100–199)
CO2 SERPL-SCNC: 27 MMOL/L (ref 20–33)
CREAT SERPL-MCNC: 0.47 MG/DL (ref 0.5–1.4)
ERYTHROCYTE [DISTWIDTH] IN BLOOD BY AUTOMATED COUNT: 41.7 FL (ref 35.9–50)
FASTING STATUS PATIENT QL REPORTED: NORMAL
GFR SERPLBLD CREATININE-BSD FMLA CKD-EPI: 126 ML/MIN/1.73 M 2
GLOBULIN SER CALC-MCNC: 2.7 G/DL (ref 1.9–3.5)
GLUCOSE SERPL-MCNC: 83 MG/DL (ref 65–99)
HCT VFR BLD AUTO: 42.1 % (ref 37–47)
HCV AB SER QL: NORMAL
HDLC SERPL-MCNC: 64 MG/DL
HGB BLD-MCNC: 13.8 G/DL (ref 12–16)
HIV 1+2 AB+HIV1 P24 AG SERPL QL IA: NORMAL
IRON SATN MFR SERPL: 45 % (ref 15–55)
IRON SERPL-MCNC: 122 UG/DL (ref 40–170)
LDLC SERPL CALC-MCNC: 109 MG/DL
MCH RBC QN AUTO: 29.9 PG (ref 27–33)
MCHC RBC AUTO-ENTMCNC: 32.8 G/DL (ref 32.2–35.5)
MCV RBC AUTO: 91.3 FL (ref 81.4–97.8)
PLATELET # BLD AUTO: 216 K/UL (ref 164–446)
PMV BLD AUTO: 10.6 FL (ref 9–12.9)
POTASSIUM SERPL-SCNC: 4.2 MMOL/L (ref 3.6–5.5)
PROT SERPL-MCNC: 6.7 G/DL (ref 6–8.2)
RBC # BLD AUTO: 4.61 M/UL (ref 4.2–5.4)
SODIUM SERPL-SCNC: 140 MMOL/L (ref 135–145)
TIBC SERPL-MCNC: 273 UG/DL (ref 250–450)
TRIGL SERPL-MCNC: 38 MG/DL (ref 0–149)
TSH SERPL DL<=0.005 MIU/L-ACNC: 1.16 UIU/ML (ref 0.38–5.33)
UIBC SERPL-MCNC: 151 UG/DL (ref 110–370)
VIT B12 SERPL-MCNC: 556 PG/ML (ref 211–911)
WBC # BLD AUTO: 4.6 K/UL (ref 4.8–10.8)

## 2024-06-11 PROCEDURE — 3078F DIAST BP <80 MM HG: CPT | Performed by: STUDENT IN AN ORGANIZED HEALTH CARE EDUCATION/TRAINING PROGRAM

## 2024-06-11 PROCEDURE — 3074F SYST BP LT 130 MM HG: CPT | Performed by: STUDENT IN AN ORGANIZED HEALTH CARE EDUCATION/TRAINING PROGRAM

## 2024-06-11 PROCEDURE — 83540 ASSAY OF IRON: CPT

## 2024-06-11 PROCEDURE — 80053 COMPREHEN METABOLIC PANEL: CPT

## 2024-06-11 PROCEDURE — G0480 DRUG TEST DEF 1-7 CLASSES: HCPCS

## 2024-06-11 PROCEDURE — 80061 LIPID PANEL: CPT

## 2024-06-11 PROCEDURE — 80307 DRUG TEST PRSMV CHEM ANLYZR: CPT

## 2024-06-11 PROCEDURE — 82306 VITAMIN D 25 HYDROXY: CPT

## 2024-06-11 PROCEDURE — 83550 IRON BINDING TEST: CPT

## 2024-06-11 PROCEDURE — 82607 VITAMIN B-12: CPT

## 2024-06-11 PROCEDURE — 85027 COMPLETE CBC AUTOMATED: CPT

## 2024-06-11 PROCEDURE — 84443 ASSAY THYROID STIM HORMONE: CPT

## 2024-06-11 PROCEDURE — 36415 COLL VENOUS BLD VENIPUNCTURE: CPT

## 2024-06-11 PROCEDURE — 99214 OFFICE O/P EST MOD 30 MIN: CPT | Performed by: STUDENT IN AN ORGANIZED HEALTH CARE EDUCATION/TRAINING PROGRAM

## 2024-06-11 PROCEDURE — 87389 HIV-1 AG W/HIV-1&-2 AB AG IA: CPT

## 2024-06-11 PROCEDURE — 86803 HEPATITIS C AB TEST: CPT

## 2024-06-11 RX ORDER — LISDEXAMFETAMINE DIMESYLATE 60 MG/1
60 CAPSULE ORAL DAILY
Qty: 30 CAPSULE | Refills: 0 | Status: SHIPPED | OUTPATIENT
Start: 2024-09-05 | End: 2024-06-27 | Stop reason: SDUPTHER

## 2024-06-11 RX ORDER — BUPRENORPHINE 8 MG/1
8 TABLET SUBLINGUAL 2 TIMES DAILY
COMMUNITY

## 2024-06-11 RX ORDER — LISDEXAMFETAMINE DIMESYLATE 60 MG/1
60 CAPSULE ORAL DAILY
Qty: 30 CAPSULE | Refills: 0 | Status: SHIPPED | OUTPATIENT
Start: 2024-08-06 | End: 2024-06-27

## 2024-06-11 RX ORDER — GABAPENTIN 300 MG/1
300 CAPSULE ORAL 3 TIMES DAILY
COMMUNITY

## 2024-06-11 RX ORDER — LISDEXAMFETAMINE DIMESYLATE 60 MG/1
60 CAPSULE ORAL DAILY
Qty: 30 CAPSULE | Refills: 0 | Status: SHIPPED | OUTPATIENT
Start: 2024-07-07 | End: 2024-06-27 | Stop reason: SDUPTHER

## 2024-06-11 ASSESSMENT — ENCOUNTER SYMPTOMS
PALPITATIONS: 0
WHEEZING: 0
SHORTNESS OF BREATH: 0
NAUSEA: 0
HEADACHES: 0
CHILLS: 0
VOMITING: 0
FEVER: 0
DIZZINESS: 0
WEIGHT LOSS: 0

## 2024-06-11 ASSESSMENT — FIBROSIS 4 INDEX: FIB4 SCORE: 1.34

## 2024-06-11 ASSESSMENT — PATIENT HEALTH QUESTIONNAIRE - PHQ9: CLINICAL INTERPRETATION OF PHQ2 SCORE: 0

## 2024-06-11 NOTE — LETTER
[facility]  Ayo Snyder M.D.  [address]  [place] [address]  Fax: [phone]   Authorization for Release/Disclosure of   Protected Health Information   Name: LIZZY RIVAS : 1985 SSN: [id number]   Address: [address] [place] [id number] Phone:    [phone] (home) [phone] (work)   I authorize the entity listed below to release/disclose the PHI below to:   [facility]/Ayo Snyder M.D. and Ayo Snyder M.D.   Provider or Entity Name:  [facility]   Address   City, State, [facility]   Phone:      Fax:     Reason for request: continuity of care   Information to be released:    [  ] LAST COLONOSCOPY,  including any PATH REPORT and follow-up  [  ] LAST FIT/COLOGUARD RESULT [  ] LAST DEXA  [  ] LAST MAMMOGRAM  [  ] LAST PAP  [  ] LAST LABS [  ] RETINA EXAM REPORT  [  ] IMMUNIZATION RECORDS  [  ] Release all info  xxxLast progress note  Xxx adhd diagnosis paperwork       [  ] Check here and initial the line next to each item to release ALL health information INCLUDING  _____ Care and treatment for drug and / or alcohol abuse  _____ HIV testing, infection status, or AIDS  _____ Genetic Testing    DATES OF SERVICE OR TIME PERIOD TO BE DISCLOSED: _____________  I understand and acknowledge that:  * This Authorization may be revoked at any time by you in writing, except if your health information has already been used or disclosed.  * Your health information that will be used or disclosed as a result of you signing this authorization could be re-disclosed by the recipient. If this occurs, your re-disclosed health information may no longer be protected by State or Federal laws.  * You may refuse to sign this Authorization. Your refusal will not affect your ability to obtain treatment.  * This Authorization becomes effective upon signing and will  on (date) __________.      If no date is indicated, this Authorization will  one (1) year from the  signature date.    Name: Yasemin Talavera  Signature: Date:   6/11/2024     PLEASE FAX REQUESTED RECORDS BACK TO: [phone]

## 2024-06-11 NOTE — PROGRESS NOTES
"Subjective:     CC:     HPI:   Yasemin presents today with  Former patient of Connor Akins M.D.   Last note pcp reviewed. Has been following with pcp for add meds/ vyvanse 50mg daily on chronic opiate therapy for chronic pain     PMH ADHD inattentive ( records requested), chronic opiate/ benzo/ stimulant therapy, fhx of female cancer on maternal side  Specialist  Pain management- [facility] -   Psychiatrist referral placed    Health Maintenance:     ROS:  Review of Systems   Constitutional:  Negative for chills, fever and weight loss.   HENT:  Negative for hearing loss.    Respiratory:  Negative for shortness of breath and wheezing.    Cardiovascular:  Negative for chest pain and palpitations.   Gastrointestinal:  Negative for nausea and vomiting.   Genitourinary:  Negative for frequency and urgency.   Skin:  Negative for rash.   Neurological:  Negative for dizziness and headaches.       Objective:     Exam:  /76 (BP Location: Left arm)   Pulse 82   Temp 36.6 °C (97.9 °F)   Ht 1.626 m (5' 4\")   Wt 78 kg (171 lb 15.3 oz)   LMP 11/20/2019   SpO2 96%   BMI 29.52 kg/m²  Body mass index is 29.52 kg/m².    Physical Exam  Constitutional:       Appearance: Normal appearance.   Cardiovascular:      Heart sounds: No murmur heard.  Pulmonary:      Effort: Pulmonary effort is normal.      Breath sounds: Normal breath sounds. No wheezing.   Musculoskeletal:      Cervical back: Normal range of motion and neck supple.   Lymphadenopathy:      Cervical: No cervical adenopathy.   Neurological:      Mental Status: She is alert.       Labs:     Assessment & Plan:     38 y.o. female with the following -   1. S/P gastric sleeve procedure  Chronic stable  Will check labs for deficiency  - CBC WITHOUT DIFFERENTIAL; Future  - HEP C VIRUS ANTIBODY; Future  - HIV AG/AB COMBO ASSAY SCREENING; Future  - Lipid Profile; Future  - TSH WITH REFLEX TO FT4; Future  - Comp Metabolic Panel; Future  - IRON/TOTAL IRON BIND; Future  - " VITAMIN B12; Future  - VITAMIN D,25 HYDROXY (DEFICIENCY); Future    2. Fibromyalgia  Chronic stable  Follows w Dr Cuellar  Patient working with pain management to titrate down suboxone   - CBC WITHOUT DIFFERENTIAL; Future  - HEP C VIRUS ANTIBODY; Future  - HIV AG/AB COMBO ASSAY SCREENING; Future  - Lipid Profile; Future  - TSH WITH REFLEX TO FT4; Future  - Comp Metabolic Panel; Future  - IRON/TOTAL IRON BIND; Future  - VITAMIN B12; Future  - VITAMIN D,25 HYDROXY (DEFICIENCY); Future    3. Attention deficit hyperactivity disorder, predominantly inattentive type  Chronic stable   On vyvanse, patient would like to slowly titrate off medication in future  Records requested  - CBC WITHOUT DIFFERENTIAL; Future  - HEP C VIRUS ANTIBODY; Future  - HIV AG/AB COMBO ASSAY SCREENING; Future  - Lipid Profile; Future  - TSH WITH REFLEX TO FT4; Future  - Comp Metabolic Panel; Future  - IRON/TOTAL IRON BIND; Future  - VITAMIN B12; Future  - VITAMIN D,25 HYDROXY (DEFICIENCY); Future  - Controlled Substance Treatment Agreement  - URINE DRUG SCREEN; Future  - Referral to Behavioral Health  - Lisdexamfetamine Dimesylate (VYVANSE) 60 MG Cap; Take 1 Capsule by mouth every day for 30 days.  Dispense: 30 Capsule; Refill: 0  - Lisdexamfetamine Dimesylate (VYVANSE) 60 MG Cap; Take 1 Capsule by mouth every day for 30 days.  Dispense: 30 Capsule; Refill: 0  - Lisdexamfetamine Dimesylate (VYVANSE) 60 MG Cap; Take 1 Capsule by mouth every day for 30 days.  Dispense: 30 Capsule; Refill: 0    4. Hot flashes  Report sometime would feel hot   Hx hysterectomy and unilateral oophrectomy  Pt agree to hold off on hormone labs at this tiem    5. Depression, unspecified depression type  6. Neurological symptoms  Requests referral to psychiatry  No suicidal at this time and patient appears quite invested in her health, seeking to down titrate on suboxone and eventually vyvanse. She would like to speak w psych regarding her depressed   - Referral to  Behavioral Health    Return in about 3 months (around 9/11/2024) for Lab review, Med check, Controlled Substance management.    Please note that this dictation was created using voice recognition software. I have made every reasonable attempt to correct obvious errors, but I expect that there are errors of grammar and possibly content that I did not discover before finalizing the note.

## 2024-06-13 LAB
AMPHET CTO UR CFM-MCNC: NORMAL NG/ML
BARBITURATES CTO UR CFM-MCNC: NEGATIVE NG/ML
BENZODIAZ CTO UR CFM-MCNC: NEGATIVE NG/ML
CANNABINOIDS CTO UR CFM-MCNC: NEGATIVE NG/ML
COCAINE CTO UR CFM-MCNC: NEGATIVE NG/ML
CREAT UR-MCNC: 128.8 MG/DL (ref 20–400)
DRUG COMMENT 753798: NORMAL
METHADONE CTO UR CFM-MCNC: NEGATIVE NG/ML
OPIATES CTO UR CFM-MCNC: NEGATIVE NG/ML
PCP CTO UR CFM-MCNC: NEGATIVE NG/ML
PROPOXYPH CTO UR CFM-MCNC: NEGATIVE NG/ML

## 2024-06-16 LAB
AMPHET UR CFM-MCNC: >5000 NG/ML
MDA UR CFM-MCNC: <200 NG/ML
MDEA UR CFM-MCNC: <200 NG/ML
MDMA UR CFM-MCNC: <200 NG/ML
METHAMPHET UR CFM-MCNC: <200 NG/ML
PHENTERMINE UR CFM-MCNC: <200 NG/ML

## 2024-06-26 ENCOUNTER — TELEPHONE (OUTPATIENT)
Dept: MEDICAL GROUP | Facility: MEDICAL CENTER | Age: 39
End: 2024-06-26
Payer: COMMERCIAL

## 2024-06-26 NOTE — TELEPHONE ENCOUNTER
Patient has changed pharmacies and is requesting that her Vyvanse be sent to the new pharmacy.    Pharmacy has been updated in chart.

## 2024-06-27 DIAGNOSIS — F90.0 ATTENTION DEFICIT HYPERACTIVITY DISORDER, PREDOMINANTLY INATTENTIVE TYPE: ICD-10-CM

## 2024-06-27 RX ORDER — LISDEXAMFETAMINE DIMESYLATE 60 MG/1
60 CAPSULE ORAL DAILY
Qty: 30 CAPSULE | Refills: 0 | Status: SHIPPED | OUTPATIENT
Start: 2024-08-06 | End: 2024-09-05

## 2024-06-27 RX ORDER — LISDEXAMFETAMINE DIMESYLATE 60 MG/1
60 CAPSULE ORAL DAILY
Qty: 30 CAPSULE | Refills: 0 | Status: SHIPPED | OUTPATIENT
Start: 2024-07-07 | End: 2024-08-06

## 2024-06-27 RX ORDER — LISDEXAMFETAMINE DIMESYLATE 60 MG/1
60 CAPSULE ORAL DAILY
Qty: 30 CAPSULE | Refills: 0 | Status: SHIPPED | OUTPATIENT
Start: 2024-09-05 | End: 2024-10-05

## 2024-07-01 ENCOUNTER — PATIENT MESSAGE (OUTPATIENT)
Dept: MEDICAL GROUP | Facility: MEDICAL CENTER | Age: 39
End: 2024-07-01
Payer: COMMERCIAL

## 2024-07-01 DIAGNOSIS — F90.0 ATTENTION DEFICIT HYPERACTIVITY DISORDER, PREDOMINANTLY INATTENTIVE TYPE: ICD-10-CM

## 2024-07-01 RX ORDER — LISDEXAMFETAMINE DIMESYLATE 60 MG/1
60 CAPSULE ORAL DAILY
Qty: 30 CAPSULE | Refills: 0 | Status: SHIPPED | OUTPATIENT
Start: 2024-08-06 | End: 2024-09-05

## 2024-07-01 RX ORDER — LISDEXAMFETAMINE DIMESYLATE 60 MG/1
60 CAPSULE ORAL DAILY
Qty: 30 CAPSULE | Refills: 0 | Status: SHIPPED | OUTPATIENT
Start: 2024-07-07 | End: 2024-08-06

## 2024-07-01 RX ORDER — LISDEXAMFETAMINE DIMESYLATE 60 MG/1
60 CAPSULE ORAL DAILY
Qty: 30 CAPSULE | Refills: 0 | Status: SHIPPED | OUTPATIENT
Start: 2024-09-05 | End: 2024-10-05

## 2024-09-05 ENCOUNTER — APPOINTMENT (OUTPATIENT)
Dept: RADIOLOGY | Facility: MEDICAL CENTER | Age: 39
End: 2024-09-05
Attending: EMERGENCY MEDICINE
Payer: COMMERCIAL

## 2024-09-05 ENCOUNTER — HOSPITAL ENCOUNTER (EMERGENCY)
Facility: MEDICAL CENTER | Age: 39
End: 2024-09-05
Attending: EMERGENCY MEDICINE
Payer: COMMERCIAL

## 2024-09-05 VITALS
DIASTOLIC BLOOD PRESSURE: 74 MMHG | OXYGEN SATURATION: 95 % | RESPIRATION RATE: 18 BRPM | TEMPERATURE: 98 F | BODY MASS INDEX: 29.35 KG/M2 | WEIGHT: 171 LBS | SYSTOLIC BLOOD PRESSURE: 120 MMHG | HEART RATE: 74 BPM

## 2024-09-05 DIAGNOSIS — S93.402A SPRAIN OF LEFT ANKLE, UNSPECIFIED LIGAMENT, INITIAL ENCOUNTER: ICD-10-CM

## 2024-09-05 PROCEDURE — 96374 THER/PROPH/DIAG INJ IV PUSH: CPT

## 2024-09-05 PROCEDURE — 700111 HCHG RX REV CODE 636 W/ 250 OVERRIDE (IP): Mod: JZ | Performed by: EMERGENCY MEDICINE

## 2024-09-05 PROCEDURE — 99285 EMERGENCY DEPT VISIT HI MDM: CPT

## 2024-09-05 PROCEDURE — 73610 X-RAY EXAM OF ANKLE: CPT | Mod: LT

## 2024-09-05 RX ADMIN — FENTANYL CITRATE 50 MCG: 50 INJECTION, SOLUTION INTRAMUSCULAR; INTRAVENOUS at 19:24

## 2024-09-05 ASSESSMENT — PAIN DESCRIPTION - PAIN TYPE: TYPE: ACUTE PAIN

## 2024-09-05 ASSESSMENT — FIBROSIS 4 INDEX: FIB4 SCORE: 1.186775553334230738

## 2024-09-06 NOTE — ED NOTES
Yasemin Talavera was discharged home by wheelchair, accompanied by caregiver. Patient is AAOX4. Breathing is even and unlabored. Patient requested assistance to car via wheelchair due to medication. Patient was read discharge instructions. Patient has no other questions or concerns at this time. Patient tolerated iv removal

## 2024-09-06 NOTE — ED TRIAGE NOTES
Pt to ed by dharmesh  Walking down steps, hurt left ankle.  Splint in place  Iv pta   100 fentayl  5 versed iv

## 2024-09-06 NOTE — ED PROVIDER NOTES
ED Provider Note    CHIEF COMPLAINT  Chief Complaint   Patient presents with    T-5000 Ankle Injury     left       HPI  Yasemin Talavera is a 39 y.o. female who presents for evaluation of the left ankle and foot pain after rolling her ankle inward coming down steps.  Patient notes she has an abrasion of the side of her foot as well.  She did not hit her head or lose consciousness and has no pain to the knee or hip on the affected side.  She has no numbness or tingling but does note she deals with chronic pain is on Neurontin and Subutex.  Patient was given 50 mcg of fentanyl and 4 mg of Versed for pain control by EMS.  EXTERNAL RECORDS REVIEWED  Reviewed patient's hospital admission in October 2023  ROS  Constitutional: No fevers or chills  Skin: Abrasion left foot  Neck: No neck pain  Chest: No pain   Pulm: No shortness of breath, or cough  Gastrointestinal: No nausea, vomiting, or abdominal pain.  Genitourinary: No dysuria or hematuria  Musculoskeletal: Pain and swelling left foot  Neurologic: No sensory or focal motor changes to extremities.   Heme: No bleeding or bruising problems.   Immuno: No hx of recurrent infections        LIMITATION TO HISTORY   None   OUTSIDE HISTORIAN(S):  none        PAST FAM HISTORY  Family History   Problem Relation Age of Onset    Diabetes Mother     Hyperlipidemia Mother     Pulmonary Embolism Mother     No Known Problems Father     Colorectal Cancer Maternal Aunt     Breast Cancer Maternal Aunt     Ovarian Cancer Maternal Aunt     Cancer Maternal Aunt     Tubal Cancer Neg Hx     Peritoneal Cancer Neg Hx        PAST MEDICAL HISTORY   has a past medical history of Anesthesia, Arrhythmia, ASTHMA, Cold (April 2017), Colitis, Dysmenorrhea, Fibromyalgia, Heart murmur, Indigestion, Narcotic abuse (HCC), Ovarian cyst, PID (pelvic inflammatory disease), Psychiatric problem, PVC's (premature ventricular contractions), Snoring, and Urinary bladder disorder.    SOCIAL HISTORY  Social History  "    Tobacco Use    Smoking status: Former     Current packs/day: 0.00     Types: Cigarettes     Quit date: 2007     Years since quittin.6    Smokeless tobacco: Never   Vaping Use    Vaping status: Every Day    Substances: Nicotine   Substance and Sexual Activity    Alcohol use: No    Drug use: No     Types: Oral     Comment: hx of opiate addiction, clean since 14    Sexual activity: Not on file       SURGICAL HISTORY   has a past surgical history that includes other; primary c section (2010); hysteroscopy thermal ablation (N/A, 7/10/2017); tubal coagulation laparoscopic bilateral (Bilateral, 7/10/2017); dilation and curettage (N/A, 7/10/2017); hysterectomy laparoscopy (N/A, 2019); salpingectomy (Bilateral, 2019); cystoscopy (N/A, 2019); lap, pillo restrict proc, longitudinal gas* (N/A, 3/15/2021); and luis fernando by laparoscopy (2021).    CURRENT MEDICATIONS  Home Medications    **Home medications have not yet been reviewed for this encounter**          ALLERGIES  Allergies   Allergen Reactions    Kdc:Yellow Dye+Ci Pigment Blue 63+Metoclopramide      Involuntary motor tick    Metoclopramide Unspecified     IV Psychiatric response- / \" psychotic break\" report was given in ED and experienced severe irrational thinking.        PHYSICAL EXAM  VITAL SIGNS: /74   Pulse 74   Temp 36.7 °C (98 °F) (Temporal)   Resp 18   Wt 77.6 kg (171 lb)   LMP 2019   SpO2 95%   BMI 29.35 kg/m²    Gen: Appears tired, otherwise no apparent distress  HEENT: No signs of trauma, Bilateral external ears normal, Nose normal. Conjunctiva normal, Non-icteric.   Cardiovascular: Regular rate and rhythm, no murmurs.  Capillary refill less than 3 seconds and 2+ distal pulses to affected extremity.  Thorax & Lungs: No increased work of breathing or tachypnea  Skin: Warm, Dry  Back: No bony tenderness, No CVA tenderness.   Extremities: Intact distal pulses, mild edema and abrasion to left lateral " foot dorsum.  There is swelling around the lateral malleolus and pain with ankle range of motion.  Achilles tendon appears intact and there is no heel tenderness.  Patient able to wiggle toes and has sensation intact to affected foot.  Neurologic: Alert , no facial droop, grossly normal coordination and strength to knee and hip on the affected side.  Psychiatric: Affect pleasant    INITIAL IMPRESSION  Patient arrives for evaluation of what appears to be an ankle inversion.  There is no obvious deformity that I can see however there is an abrasion and there is swelling to the lateral foot and malleolus.  There is tenderness in the same area.  There is no knee pain or hip pain.  Patient states understanding we will get a diagnostic x-ray of the ankle.  Noted that she is on Subutex and will likely not respond very well to narcotic pain medication.    ED observation? No    I have independently interpreted the diagnostic imaging associated with this visit and am waiting the final reading from the radiologist.   My preliminary interpretation is a follows: Left ankle 3 view: No fractures or dislocations noted  RADIOLOGY  DX-ANKLE 3+ VIEWS LEFT   Final Result      Negative for fracture or malalignment          ASSESSMENT, COURSE AND PLAN  Care Narrative: Patient appears to have an ankle and/or foot sprain or strain.  She has no overlying abrasion but did not have any evidence for an acute fracture or dislocation.  She was intact neurovascularly distal to the injury and was actually able to dorsiflex her foot quite well without too much distress.  She will be given a boot and crutches and will follow-up with the orthopedic surgeon in 1 to 2 weeks.  At this point I do not feel advanced imaging or laboratory evaluation will benefit the patient or .          ADDITIONAL PROBLEMS MANAGED    Pertinent Labs & Imaging studies reviewed. (See chart for details)      I have discussed management of the patient with the  following physicians and JUJU's: None    Escalation of care considered, and ultimately not performed: Labs, advanced imaging    Barriers to care at this time, including but not limited to: .  None    Decision tools and Rx drugs considered including, but not limited to : None    Discussion of management with other QHP or appropriate source(s): None     The patient will return for worsening symptoms and is stable at the time of discharge. The patient verbalizes understanding and will comply.    FINAL IMPRESSION  1. Sprain of left ankle, unspecified ligament, initial encounter        Electronically signed by: Hilario Johnson M.D., 9/5/2024 6:26 PM    Statement Selected

## 2024-09-13 ENCOUNTER — OFFICE VISIT (OUTPATIENT)
Dept: MEDICAL GROUP | Facility: MEDICAL CENTER | Age: 39
End: 2024-09-13
Payer: COMMERCIAL

## 2024-09-13 ENCOUNTER — APPOINTMENT (OUTPATIENT)
Dept: MEDICAL GROUP | Facility: MEDICAL CENTER | Age: 39
End: 2024-09-13
Payer: COMMERCIAL

## 2024-09-13 VITALS
HEIGHT: 64 IN | SYSTOLIC BLOOD PRESSURE: 82 MMHG | TEMPERATURE: 98 F | OXYGEN SATURATION: 98 % | DIASTOLIC BLOOD PRESSURE: 50 MMHG | RESPIRATION RATE: 13 BRPM | BODY MASS INDEX: 29.88 KG/M2 | HEART RATE: 82 BPM | WEIGHT: 175 LBS

## 2024-09-13 DIAGNOSIS — F90.0 ATTENTION DEFICIT HYPERACTIVITY DISORDER, PREDOMINANTLY INATTENTIVE TYPE: ICD-10-CM

## 2024-09-13 PROCEDURE — 3074F SYST BP LT 130 MM HG: CPT | Performed by: FAMILY MEDICINE

## 2024-09-13 PROCEDURE — 3078F DIAST BP <80 MM HG: CPT | Performed by: FAMILY MEDICINE

## 2024-09-13 PROCEDURE — 99213 OFFICE O/P EST LOW 20 MIN: CPT | Performed by: FAMILY MEDICINE

## 2024-09-13 RX ORDER — METHYLPHENIDATE HYDROCHLORIDE 5 MG/1
5 TABLET ORAL DAILY
Qty: 30 TABLET | Refills: 0 | Status: SHIPPED | OUTPATIENT
Start: 2024-10-13 | End: 2024-09-26

## 2024-09-13 RX ORDER — LISDEXAMFETAMINE DIMESYLATE 60 MG/1
60 CAPSULE ORAL DAILY
Qty: 30 CAPSULE | Refills: 0 | Status: SHIPPED | OUTPATIENT
Start: 2024-09-28 | End: 2024-09-26

## 2024-09-13 RX ORDER — LISDEXAMFETAMINE DIMESYLATE 60 MG/1
60 CAPSULE ORAL DAILY
Qty: 30 CAPSULE | Refills: 0 | Status: SHIPPED | OUTPATIENT
Start: 2024-10-28 | End: 2024-09-26

## 2024-09-13 RX ORDER — METHYLPHENIDATE HYDROCHLORIDE 5 MG/1
5 TABLET ORAL DAILY
Qty: 30 TABLET | Refills: 0 | Status: SHIPPED | OUTPATIENT
Start: 2024-09-13 | End: 2024-09-26

## 2024-09-13 RX ORDER — METHYLPHENIDATE HYDROCHLORIDE 5 MG/1
5 TABLET ORAL DAILY
Qty: 30 TABLET | Refills: 0 | Status: SHIPPED | OUTPATIENT
Start: 2024-11-12 | End: 2024-09-26

## 2024-09-13 RX ORDER — LISDEXAMFETAMINE DIMESYLATE 60 MG/1
60 CAPSULE ORAL DAILY
Qty: 30 CAPSULE | Refills: 0 | Status: SHIPPED | OUTPATIENT
Start: 2024-11-27 | End: 2024-12-27

## 2024-09-13 ASSESSMENT — FIBROSIS 4 INDEX: FIB4 SCORE: 1.186775553334230738

## 2024-09-13 NOTE — PROGRESS NOTES
Subjective:     CC:     HPI:   Yasemin presents today with    PMH ADHD inattentive ( reccords reviewed from last pcp), chronic opiate/ benzo/ stimulant therapy, fhx of female cancer on maternal side  Specialist  Pain management- [facility] - buprenorphine  Psychiatrist referral placed    Since last visit seen in ED for sprained ankle       No problems updated.    Health Maintenance: {COMPLETED:097247}    ROS:  ROS    Objective:     Exam:  LMP 11/20/2019  There is no height or weight on file to calculate BMI.    Physical Exam    {A chaperone was offered to the patient during today's exam.:87890}    Labs: ***    Assessment & Plan:     39 y.o. female with the following -     ***        Referral for genetic research was offered. Patient {declined/accepted}.    I spent a total of *** minutes with record review, exam, communication with the patient, communication with other providers, and documentation of this encounter.      No follow-ups on file.    Please note that this dictation was created using voice recognition software. I have made every reasonable attempt to correct obvious errors, but I expect that there are errors of grammar and possibly content that I did not discover before finalizing the note.

## 2024-09-13 NOTE — PROGRESS NOTES
Chief Complaint   Patient presents with    Follow-Up    ADHD       Subjective:     HPI:   Yasemin Talavera presents today with the following: Patient of Dr. Snyder.  He does not have availability today.    1. Attention deficit hyperactivity disorder, predominantly inattentive type  Patient is on a regimen of Vyvanse for ADHD treatment which has been successful.  She is finding that the generic while much more affordable does tend to wear off a little faster but overall is still helpful.  PDMP review shows no inconsistencies.  Patient denies palpitations or severe tremor.  Denies insomnia from the regimen.  She is also on buprenorphine through pain management.  No adverse reaction would be anticipated.  Additionally she is on low-dose methylphenidate 1 in the afternoon to be taken when needed as the list asks amphetamine wears off.  She is also on gabapentin from her pain management physician as well.        Patient Active Problem List    Diagnosis Date Noted    Attention deficit hyperactivity disorder, predominantly inattentive type 06/11/2024    Neurological symptoms 10/09/2023    Fibromyalgia 10/09/2023    Cyst of left ovary 04/11/2022    Status post laparoscopic cholecystectomy 07/08/2021    S/P gastric sleeve procedure 07/07/2021       Current medicines (including changes today)  Current Outpatient Medications   Medication Sig Dispense Refill    [START ON 9/28/2024] Lisdexamfetamine Dimesylate (VYVANSE) 60 MG Cap Take 1 Capsule by mouth every day for 30 days. 30 tablets is a 30 day quantity 30 Capsule 0    [START ON 10/28/2024] Lisdexamfetamine Dimesylate (VYVANSE) 60 MG Cap Take 1 Capsule by mouth every day for 30 days. 30 tablets is a 30 day quantity 30 Capsule 0    [START ON 11/27/2024] Lisdexamfetamine Dimesylate (VYVANSE) 60 MG Cap Take 1 Capsule by mouth every day for 30 days. 30 tablets is a 30 day quantity 30 Capsule 0    methylphenidate (RITALIN) 5 MG Tab Take 1 Tablet by mouth every day for 30 days.  "30 tablets is a 30 day quantity 30 Tablet 0    [START ON 10/13/2024] methylphenidate (RITALIN) 5 MG Tab Take 1 Tablet by mouth every day for 30 days. 30 tablets is a 30 day quantity 30 Tablet 0    [START ON 11/12/2024] methylphenidate (RITALIN) 5 MG Tab Take 1 Tablet by mouth every day for 30 days. 30 tablets is a 30 day quantity 30 Tablet 0    gabapentin (NEURONTIN) 300 MG Cap Take 300 mg by mouth 3 times a day.      buprenorphine (SUBUTEX) 8 MG SL Tab Take 8 mg by mouth 2 times a day.       No current facility-administered medications for this visit.       Allergies   Allergen Reactions    Kdc:Yellow Dye+Ci Pigment Blue 63+Metoclopramide      Involuntary motor tick    Metoclopramide Unspecified     IV Psychiatric response- / \" psychotic break\" report was given in ED and experienced severe irrational thinking.        ROS: As per HPI       Objective:     BP (!) 82/50   Pulse 82   Temp 36.7 °C (98 °F)   Resp 13   Ht 1.626 m (5' 4\")   Wt 79.4 kg (175 lb)   SpO2 98%  Body mass index is 30.04 kg/m².    Physical Exam:  Constitutional: Well-developed and well-nourished. Not diaphoretic. No distress. Lucid and fluent.  Patient's speech is goal-directed and appropriate.  Skin: Skin is warm and dry. No rash noted.  Head: Atraumatic without lesions.  Eyes: Conjunctivae and extraocular motions are normal. Pupils are equal, round, and reactive to light. No scleral icterus.   Ears:  External ears unremarkable.   Neck: Supple, trachea midline. No thyromegaly present. No cervical or supraclavicular lymphadenopathy. No JVD appreciated  Cardiovascular: Regular rate and rhythm.  Normal S1, S2 without murmur appreciated.  Chest: Effort normal. Clear to auscultation throughout. No adventitious sounds.   Extremities: No cyanosis, clubbing, erythema, nor edema.   Neurological: Alert and oriented x 3.  No tremor appreciated.  Movements symmetric.  Gait is steady.  Psychiatric:  Behavior, mood, and affect are appropriate.     "   Assessment and Plan:     39 y.o. female with the following issues:    1. Attention deficit hyperactivity disorder, predominantly inattentive type  Lisdexamfetamine Dimesylate (VYVANSE) 60 MG Cap    Lisdexamfetamine Dimesylate (VYVANSE) 60 MG Cap    Lisdexamfetamine Dimesylate (VYVANSE) 60 MG Cap    methylphenidate (RITALIN) 5 MG Tab    methylphenidate (RITALIN) 5 MG Tab    methylphenidate (RITALIN) 5 MG Tab            Followup: Return in about 3 months (around 12/13/2024), or if symptoms worsen or fail to improve.

## 2024-09-25 ENCOUNTER — APPOINTMENT (OUTPATIENT)
Dept: MEDICAL GROUP | Facility: MEDICAL CENTER | Age: 39
End: 2024-09-25
Payer: COMMERCIAL

## 2024-09-25 ENCOUNTER — TELEPHONE (OUTPATIENT)
Dept: MEDICAL GROUP | Facility: MEDICAL CENTER | Age: 39
End: 2024-09-25
Payer: COMMERCIAL

## 2024-09-26 ENCOUNTER — TELEMEDICINE (OUTPATIENT)
Dept: MEDICAL GROUP | Facility: MEDICAL CENTER | Age: 39
End: 2024-09-26
Payer: COMMERCIAL

## 2024-09-26 VITALS — WEIGHT: 130 LBS | BODY MASS INDEX: 22.2 KG/M2 | HEIGHT: 64 IN

## 2024-09-26 DIAGNOSIS — F90.0 ATTENTION DEFICIT HYPERACTIVITY DISORDER, PREDOMINANTLY INATTENTIVE TYPE: ICD-10-CM

## 2024-09-26 DIAGNOSIS — Z90.3 S/P GASTRIC SLEEVE PROCEDURE: ICD-10-CM

## 2024-09-26 DIAGNOSIS — S93.402D SPRAIN OF LEFT ANKLE, UNSPECIFIED LIGAMENT, SUBSEQUENT ENCOUNTER: ICD-10-CM

## 2024-09-26 DIAGNOSIS — M79.7 FIBROMYALGIA: ICD-10-CM

## 2024-09-26 PROCEDURE — 99214 OFFICE O/P EST MOD 30 MIN: CPT | Performed by: STUDENT IN AN ORGANIZED HEALTH CARE EDUCATION/TRAINING PROGRAM

## 2024-09-26 RX ORDER — METHYLPHENIDATE HYDROCHLORIDE 20 MG/1
20 TABLET ORAL 2 TIMES DAILY
Qty: 60 TABLET | Refills: 0 | Status: SHIPPED | OUTPATIENT
Start: 2024-09-26 | End: 2024-10-26

## 2024-09-26 RX ORDER — METHYLPHENIDATE HYDROCHLORIDE 20 MG/1
20 TABLET ORAL 2 TIMES DAILY
Qty: 60 TABLET | Refills: 0 | Status: SHIPPED | OUTPATIENT
Start: 2024-10-26 | End: 2024-11-25

## 2024-09-26 ASSESSMENT — FIBROSIS 4 INDEX: FIB4 SCORE: 1.186775553334230738

## 2024-09-26 NOTE — PROGRESS NOTES
Virtual Visit: Established Patient   This visit was conducted via Teams using secure and encrypted videoconferencing technology.   The patient was in their home in the [facility].    The patient's identity was confirmed and verbal consent was obtained for this virtual visit.    Subjective:   CC: No chief complaint on file.    Yasemin Talavera is a 39 y.o. female presenting for evaluation and management of:    Problem   Attention Deficit Hyperactivity Disorder, Predominantly Inattentive Type    This is chronic condition diagnosed by prior primary provider, first diagnosed by psychiatrist. She was previously on short acting stimulant and it did not feel effective, and she was transition to vyvanse. However she reports   - Other things socially/ divorce going on with me, and me not working   - she wanted to see stepping away from vyvanse and seeing if she can eventually transition to as needed medication.   - she report she stopped vyvanse for past 72 hours.     Lab 6/11/2024   Kidney function is fine, liver enzyme not elevated  Cholesterol is fine, no significantly changed from prior, Blood count is fine , thyroid is fine, vitamin D/B12 adequate, no iron deficiency.           ROS       Current medicines (including changes today)  Current Outpatient Medications   Medication Sig Dispense Refill    methylphenidate (RITALIN) 20 MG tablet Take 1 Tablet by mouth 2 times a day for 30 days. 60 Tablet 0    [START ON 10/26/2024] methylphenidate (RITALIN) 20 MG tablet Take 1 Tablet by mouth 2 times a day for 30 days. 60 Tablet 0    [START ON 11/27/2024] Lisdexamfetamine Dimesylate (VYVANSE) 60 MG Cap Take 1 Capsule by mouth every day for 30 days. 30 tablets is a 30 day quantity 30 Capsule 0    gabapentin (NEURONTIN) 300 MG Cap Take 300 mg by mouth 3 times a day.      buprenorphine (SUBUTEX) 8 MG SL Tab Take 8 mg by mouth 2 times a day.       No current facility-administered medications for this visit.       Patient Active  "Problem List    Diagnosis Date Noted    Attention deficit hyperactivity disorder, predominantly inattentive type 06/11/2024    Neurological symptoms 10/09/2023    Fibromyalgia 10/09/2023    Cyst of left ovary 04/11/2022    Status post laparoscopic cholecystectomy 07/08/2021    S/P gastric sleeve procedure 07/07/2021        Objective:   Ht 1.626 m (5' 4\")   Wt 59 kg (130 lb)   LMP 11/20/2019   BMI 22.31 kg/m²     Physical Exam:  Constitutional: Alert, no distress, well-groomed.  Skin: No rashes in visible areas.  Eye: Round. Conjunctiva clear, lids normal. No icterus.   ENMT: Lips pink without lesions, good dentition, moist mucous membranes. Phonation normal.  Neck: No masses, no thyromegaly. Moves freely without pain.  Respiratory: Unlabored respiratory effort, no cough or audible wheeze  Psych: Alert and oriented x3, normal affect and mood.     Assessment and Plan:   The following treatment plan was discussed:     1. Attention deficit hyperactivity disorder, predominantly inattentive type  Chronic stable  - methylphenidate (RITALIN) 20 MG tablet; Take 1 Tablet by mouth 2 times a day for 30 days.  Dispense: 60 Tablet; Refill: 0  - methylphenidate (RITALIN) 20 MG tablet; Take 1 Tablet by mouth 2 times a day for 30 days.  Dispense: 60 Tablet; Refill: 0    2. Fibromyalgia  Following with Dr Cuellar  Report she will want to try to work with Dr Cuellar to get off buprenorphine     3. S/P gastric sleeve procedure  Chronic stable  No vitamin deficiency noted per lab review    4. Sprain of left ankle, unspecified ligament, subsequent encounter  See at [facility] report had hair line fracture of metatarsal, doing well         Follow-up: Return in about 4 weeks (around 10/24/2024).         "

## 2024-10-15 DIAGNOSIS — F90.0 ATTENTION DEFICIT HYPERACTIVITY DISORDER, PREDOMINANTLY INATTENTIVE TYPE: ICD-10-CM

## 2024-10-15 RX ORDER — METHYLPHENIDATE HYDROCHLORIDE 5 MG/1
5 TABLET ORAL
Qty: 30 TABLET | Refills: 0 | Status: SHIPPED | OUTPATIENT
Start: 2024-11-14 | End: 2024-10-24

## 2024-10-15 RX ORDER — METHYLPHENIDATE HYDROCHLORIDE 5 MG/1
5 TABLET ORAL
Qty: 30 TABLET | Refills: 0 | Status: SHIPPED | OUTPATIENT
Start: 2024-12-14 | End: 2024-10-24

## 2024-10-15 RX ORDER — METHYLPHENIDATE HYDROCHLORIDE 5 MG/1
5 TABLET ORAL
Qty: 30 TABLET | Refills: 0 | Status: SHIPPED | OUTPATIENT
Start: 2024-10-15 | End: 2024-10-24

## 2024-10-24 ENCOUNTER — TELEMEDICINE (OUTPATIENT)
Dept: MEDICAL GROUP | Facility: MEDICAL CENTER | Age: 39
End: 2024-10-24
Payer: COMMERCIAL

## 2024-10-24 VITALS — BODY MASS INDEX: 26.46 KG/M2 | HEIGHT: 64 IN | WEIGHT: 155 LBS

## 2024-10-24 DIAGNOSIS — F90.0 ATTENTION DEFICIT HYPERACTIVITY DISORDER, PREDOMINANTLY INATTENTIVE TYPE: ICD-10-CM

## 2024-10-24 DIAGNOSIS — F17.200 VAPING NICOTINE DEPENDENCE, NON-TOBACCO PRODUCT: ICD-10-CM

## 2024-10-24 PROCEDURE — 99214 OFFICE O/P EST MOD 30 MIN: CPT | Performed by: STUDENT IN AN ORGANIZED HEALTH CARE EDUCATION/TRAINING PROGRAM

## 2024-10-24 RX ORDER — METHYLPHENIDATE HYDROCHLORIDE 20 MG/1
20 TABLET ORAL 3 TIMES DAILY
Qty: 90 TABLET | Refills: 0 | Status: SHIPPED | OUTPATIENT
Start: 2024-11-23 | End: 2024-12-23

## 2024-10-24 RX ORDER — METHYLPHENIDATE HYDROCHLORIDE 20 MG/1
20 TABLET ORAL 3 TIMES DAILY
Qty: 90 TABLET | Refills: 0 | Status: SHIPPED | OUTPATIENT
Start: 2024-12-23 | End: 2025-01-22

## 2024-10-24 RX ORDER — METHYLPHENIDATE HYDROCHLORIDE 20 MG/1
20 TABLET ORAL 3 TIMES DAILY
Qty: 90 TABLET | Refills: 0 | Status: SHIPPED | OUTPATIENT
Start: 2024-10-24 | End: 2024-11-23

## 2024-10-24 ASSESSMENT — FIBROSIS 4 INDEX: FIB4 SCORE: 1.186775553334230738

## 2024-11-19 DIAGNOSIS — F90.0 ATTENTION DEFICIT HYPERACTIVITY DISORDER, PREDOMINANTLY INATTENTIVE TYPE: ICD-10-CM

## 2024-11-19 RX ORDER — METHYLPHENIDATE HYDROCHLORIDE 20 MG/1
20 TABLET ORAL 3 TIMES DAILY
Qty: 90 TABLET | Refills: 0 | Status: SHIPPED | OUTPATIENT
Start: 2024-11-23 | End: 2024-12-23

## 2024-11-20 ENCOUNTER — HOSPITAL ENCOUNTER (OUTPATIENT)
Facility: MEDICAL CENTER | Age: 39
End: 2024-11-20
Attending: PHYSICIAN ASSISTANT
Payer: COMMERCIAL

## 2024-11-20 ENCOUNTER — APPOINTMENT (OUTPATIENT)
Dept: URGENT CARE | Facility: PHYSICIAN GROUP | Age: 39
End: 2024-11-20
Payer: COMMERCIAL

## 2024-11-20 ENCOUNTER — OFFICE VISIT (OUTPATIENT)
Dept: URGENT CARE | Facility: PHYSICIAN GROUP | Age: 39
End: 2024-11-20
Payer: COMMERCIAL

## 2024-11-20 VITALS
WEIGHT: 172.62 LBS | HEIGHT: 64 IN | HEART RATE: 74 BPM | SYSTOLIC BLOOD PRESSURE: 94 MMHG | RESPIRATION RATE: 17 BRPM | BODY MASS INDEX: 29.47 KG/M2 | OXYGEN SATURATION: 98 % | DIASTOLIC BLOOD PRESSURE: 62 MMHG | TEMPERATURE: 97.7 F

## 2024-11-20 DIAGNOSIS — N30.00 ACUTE CYSTITIS WITHOUT HEMATURIA: ICD-10-CM

## 2024-11-20 DIAGNOSIS — N30.00 ACUTE CYSTITIS WITHOUT HEMATURIA: Primary | ICD-10-CM

## 2024-11-20 LAB
APPEARANCE UR: NORMAL
BILIRUB UR STRIP-MCNC: NORMAL MG/DL
COLOR UR AUTO: NORMAL
GLUCOSE UR STRIP.AUTO-MCNC: 250 MG/DL
KETONES UR STRIP.AUTO-MCNC: NORMAL MG/DL
LEUKOCYTE ESTERASE UR QL STRIP.AUTO: NORMAL
NITRITE UR QL STRIP.AUTO: POSITIVE
PH UR STRIP.AUTO: 5 [PH] (ref 5–8)
PROT UR QL STRIP: 100 MG/DL
RBC UR QL AUTO: NEGATIVE
SP GR UR STRIP.AUTO: <=1.005
UROBILINOGEN UR STRIP-MCNC: >=8 MG/DL

## 2024-11-20 PROCEDURE — 87186 SC STD MICRODIL/AGAR DIL: CPT

## 2024-11-20 PROCEDURE — 99213 OFFICE O/P EST LOW 20 MIN: CPT | Performed by: PHYSICIAN ASSISTANT

## 2024-11-20 PROCEDURE — 3078F DIAST BP <80 MM HG: CPT | Performed by: PHYSICIAN ASSISTANT

## 2024-11-20 PROCEDURE — 87086 URINE CULTURE/COLONY COUNT: CPT

## 2024-11-20 PROCEDURE — 3074F SYST BP LT 130 MM HG: CPT | Performed by: PHYSICIAN ASSISTANT

## 2024-11-20 PROCEDURE — 87077 CULTURE AEROBIC IDENTIFY: CPT

## 2024-11-20 PROCEDURE — 81002 URINALYSIS NONAUTO W/O SCOPE: CPT | Performed by: PHYSICIAN ASSISTANT

## 2024-11-20 RX ORDER — NITROFURANTOIN 25; 75 MG/1; MG/1
100 CAPSULE ORAL 2 TIMES DAILY
Qty: 10 CAPSULE | Refills: 0 | Status: SHIPPED | OUTPATIENT
Start: 2024-11-20 | End: 2024-11-25

## 2024-11-20 ASSESSMENT — ENCOUNTER SYMPTOMS
ABDOMINAL PAIN: 0
CARDIOVASCULAR NEGATIVE: 1
RESPIRATORY NEGATIVE: 1
FLANK PAIN: 0
NAUSEA: 0
DIARRHEA: 0
CONSTITUTIONAL NEGATIVE: 1
VOMITING: 0

## 2024-11-20 ASSESSMENT — FIBROSIS 4 INDEX: FIB4 SCORE: 1.186775553334230738

## 2024-11-21 NOTE — PROGRESS NOTES
"  Subjective:     Yasemin Talavera  is a 39 y.o. female who presents for UTI (Sx started this morning painful urination, awful odor when urinating, frequent urination, is taking AZO as well, abdominal burning,/cramping, )       She presents today with painful urination, malodorous urine and urinary frequency that began this morning.  She does have a history of UTIs, current symptoms do feel similar to those previously experienced.  Most recent UTI was several years ago, she has been treated with various antibiotic regimens historically, denies any known history of antibiotic resistance with urinary tract infections.  She denies any fevers, no flank pain.  Has been using Azo for symptom support.  No nausea or vomiting, no severe abdominal pain, no diarrhea.  Previous hysterectomy.  No vaginal pain bleeding or discharge.       Review of Systems   Constitutional: Negative.    Respiratory: Negative.     Cardiovascular: Negative.    Gastrointestinal:  Negative for abdominal pain, diarrhea, nausea and vomiting.   Genitourinary:  Positive for dysuria, frequency and urgency. Negative for flank pain and hematuria.      Allergies   Allergen Reactions    Kdc:Yellow Dye+Ci Pigment Blue 63+Metoclopramide      Involuntary motor tick    Metoclopramide Unspecified     IV Psychiatric response- / \" psychotic break\" report was given in ED and experienced severe irrational thinking.      Past Medical History:   Diagnosis Date    Anesthesia     slow to wake up    Arrhythmia     PVCs and PACs, has had holter in the past    ASTHMA     in the past, no problem now    Cold 04/2017    upper respiratory infection    Colitis     Dysmenorrhea     Fibromyalgia     Heart murmur     small murmur    Indigestion     Narcotic abuse (HCC)     Ovarian cyst     PID (pelvic inflammatory disease)     Psychiatric problem     anxiety, ADHD    PVC's (premature ventricular contractions)     Snoring     Urinary bladder disorder     frequent UTI's        " "Objective:   BP 94/62 (BP Location: Left arm, Patient Position: Sitting, BP Cuff Size: Large adult)   Pulse 74   Temp 36.5 °C (97.7 °F) (Temporal)   Resp 17   Ht 1.626 m (5' 4\")   Wt 78.3 kg (172 lb 9.9 oz)   LMP 11/20/2019   SpO2 98%   BMI 29.63 kg/m²   Physical Exam  Vitals and nursing note reviewed.   Constitutional:       General: She is not in acute distress.     Appearance: She is not ill-appearing or toxic-appearing.   HENT:      Head: Normocephalic.   Eyes:      General: No scleral icterus.     Conjunctiva/sclera: Conjunctivae normal.   Cardiovascular:      Rate and Rhythm: Normal rate and regular rhythm.   Pulmonary:      Effort: Pulmonary effort is normal. No respiratory distress.      Breath sounds: Normal breath sounds. No stridor.   Abdominal:      General: Abdomen is flat. Bowel sounds are normal. There is no distension.      Palpations: Abdomen is soft.      Tenderness: There is no abdominal tenderness. There is no right CVA tenderness, left CVA tenderness or guarding.   Musculoskeletal:      Cervical back: Neck supple.   Neurological:      Mental Status: She is alert and oriented to person, place, and time.   Psychiatric:         Mood and Affect: Mood normal.         Behavior: Behavior normal.         Thought Content: Thought content normal.         Judgment: Judgment normal.             Diagnostic testing:    POC urinalysis-positive glucose, small bilirubin, trace ketones, positive protein, positive euro bilirubin, positive nitrites, large leukocytes, all others within normal limits    Urine culture-pending    Assessment/Plan:     Encounter Diagnoses   Name Primary?    Acute cystitis without hematuria Yes          Plan for care for today's complaint includes the patient on Macrobid empirically for suspected acute cystitis.  Urine culture obtained; will contact the patient via surespot to discuss the results of the testing obtained today, will adjust treatment plan accordingly.  No " evidence of pyelonephritis on exam today.  Vital signs were stable during today's office visit, patient was overall well-appearing. Continue to monitor symptoms and return to urgent care or follow-up with primary care provider if symptoms remain ongoing.  Follow-up in the emergency department if symptoms become severe, ER precautions discussed in office today..  Prescription for Macrobid provided.    See AVS Instructions below for written guidance provided to patient on after-visit management and care in addition to our verbal discussion during the visit.    Please note that this dictation was created using voice recognition software. I have attempted to correct all errors, but there may be sound-alike, spelling, grammar and possibly content errors that I did not discover before finalizing the note.    Eddie Pinto PA-C

## 2024-11-23 LAB
BACTERIA UR CULT: ABNORMAL
BACTERIA UR CULT: ABNORMAL
SIGNIFICANT IND 70042: ABNORMAL
SITE SITE: ABNORMAL
SOURCE SOURCE: ABNORMAL

## 2024-12-01 ENCOUNTER — OFFICE VISIT (OUTPATIENT)
Dept: URGENT CARE | Facility: PHYSICIAN GROUP | Age: 39
End: 2024-12-01
Payer: COMMERCIAL

## 2024-12-01 ENCOUNTER — HOSPITAL ENCOUNTER (OUTPATIENT)
Facility: MEDICAL CENTER | Age: 39
End: 2024-12-01
Attending: FAMILY MEDICINE
Payer: COMMERCIAL

## 2024-12-01 VITALS
OXYGEN SATURATION: 98 % | TEMPERATURE: 96.6 F | HEART RATE: 80 BPM | HEIGHT: 64 IN | BODY MASS INDEX: 29.63 KG/M2 | DIASTOLIC BLOOD PRESSURE: 74 MMHG | RESPIRATION RATE: 17 BRPM | SYSTOLIC BLOOD PRESSURE: 106 MMHG

## 2024-12-01 DIAGNOSIS — R11.0 NAUSEA: ICD-10-CM

## 2024-12-01 DIAGNOSIS — N30.01 ACUTE CYSTITIS WITH HEMATURIA: ICD-10-CM

## 2024-12-01 DIAGNOSIS — R10.9 FLANK PAIN: ICD-10-CM

## 2024-12-01 DIAGNOSIS — R30.0 DYSURIA: ICD-10-CM

## 2024-12-01 DIAGNOSIS — N39.0 RECURRENT UTI: ICD-10-CM

## 2024-12-01 LAB
APPEARANCE UR: NORMAL
BILIRUB UR STRIP-MCNC: NORMAL MG/DL
COLOR UR AUTO: NORMAL
GLUCOSE UR STRIP.AUTO-MCNC: 250 MG/DL
KETONES UR STRIP.AUTO-MCNC: 15 MG/DL
LEUKOCYTE ESTERASE UR QL STRIP.AUTO: NORMAL
NITRITE UR QL STRIP.AUTO: POSITIVE
PH UR STRIP.AUTO: 5 [PH] (ref 5–8)
PROT UR QL STRIP: >=300 MG/DL
RBC UR QL AUTO: NEGATIVE
SP GR UR STRIP.AUTO: <=1.005
UROBILINOGEN UR STRIP-MCNC: >=8 MG/DL

## 2024-12-01 PROCEDURE — 81002 URINALYSIS NONAUTO W/O SCOPE: CPT | Performed by: FAMILY MEDICINE

## 2024-12-01 PROCEDURE — 3074F SYST BP LT 130 MM HG: CPT | Performed by: FAMILY MEDICINE

## 2024-12-01 PROCEDURE — 99214 OFFICE O/P EST MOD 30 MIN: CPT | Performed by: FAMILY MEDICINE

## 2024-12-01 PROCEDURE — 87186 SC STD MICRODIL/AGAR DIL: CPT

## 2024-12-01 PROCEDURE — 87086 URINE CULTURE/COLONY COUNT: CPT

## 2024-12-01 PROCEDURE — 3078F DIAST BP <80 MM HG: CPT | Performed by: FAMILY MEDICINE

## 2024-12-01 PROCEDURE — 87077 CULTURE AEROBIC IDENTIFY: CPT

## 2024-12-01 RX ORDER — CEFDINIR 300 MG/1
300 CAPSULE ORAL EVERY 12 HOURS
Qty: 14 CAPSULE | Refills: 0 | Status: SHIPPED | OUTPATIENT
Start: 2024-12-01 | End: 2024-12-08

## 2024-12-01 RX ORDER — PHENAZOPYRIDINE HYDROCHLORIDE 200 MG/1
200 TABLET, FILM COATED ORAL 3 TIMES DAILY
Qty: 6 TABLET | Refills: 0 | Status: SHIPPED | OUTPATIENT
Start: 2024-12-01 | End: 2024-12-03

## 2024-12-01 RX ORDER — ONDANSETRON 4 MG/1
4 TABLET, ORALLY DISINTEGRATING ORAL EVERY 6 HOURS PRN
Qty: 10 TABLET | Refills: 0 | Status: SHIPPED | OUTPATIENT
Start: 2024-12-01

## 2024-12-01 ASSESSMENT — ENCOUNTER SYMPTOMS
FEVER: 0
FLANK PAIN: 1
VOMITING: 0
COUGH: 0
SORE THROAT: 0
MYALGIAS: 0
NAUSEA: 1

## 2024-12-02 DIAGNOSIS — N30.01 ACUTE CYSTITIS WITH HEMATURIA: ICD-10-CM

## 2024-12-02 DIAGNOSIS — R10.9 FLANK PAIN: ICD-10-CM

## 2024-12-02 DIAGNOSIS — R30.0 DYSURIA: ICD-10-CM

## 2024-12-02 DIAGNOSIS — N39.0 RECURRENT UTI: ICD-10-CM

## 2024-12-02 NOTE — PROGRESS NOTES
Subjective:   Yasemin Talavera is a 39 y.o. female who presents for Painful Urination (Tx in UC for UTI last week, urine cx showed it was the e. coli strain. Completed antibiotics course, but the day after completing the course abdominal pain/painful urination has increased significantly, and now having R side flank pain. Foul odor to urine. Hx of UTIs. )        UTI  Chronicity: Reports dysuria, reports foul odor to urine, right-sided flank pain, reports recent urinary tract infection 2 weeks prior which a course of nitrofurantoin was completed. Episode onset: 1 week, reports symptoms onset directly after completing course of antibiotic of nitrofurantoin. The problem occurs constantly. The problem has been unchanged. Associated symptoms include nausea and urinary symptoms. Pertinent negatives include no coughing, fever, myalgias, rash, sore throat or vomiting. Associated symptoms comments: Previous urine culture demonstrated E. coli with diffuse sensitivities, Macrobid prescribed 11/20/2024. Treatments tried: Previous antibiotic. The treatment provided mild relief.     PMH:  has a past medical history of Anesthesia, Arrhythmia, ASTHMA, Cold (04/2017), Colitis, Dysmenorrhea, Fibromyalgia, Heart murmur, Indigestion, Narcotic abuse ([facility]), Ovarian cyst, PID (pelvic inflammatory disease), Psychiatric problem, PVC's (premature ventricular contractions), Snoring, and Urinary bladder disorder.  MEDS:   Current Outpatient Medications:     cefdinir (OMNICEF) 300 MG Cap, Take 1 Capsule by mouth every 12 hours for 7 days., Disp: 14 Capsule, Rfl: 0    ondansetron (ZOFRAN ODT) 4 MG TABLET DISPERSIBLE, Take 1 Tablet by mouth every 6 hours as needed for Nausea/Vomiting., Disp: 10 Tablet, Rfl: 0    phenazopyridine (PYRIDIUM) 200 MG Tab, Take 1 Tablet by mouth 3 times a day for 2 days., Disp: 6 Tablet, Rfl: 0    methylphenidate (RITALIN) 20 MG tablet, Take 1 Tablet by mouth 3 times a day for 30 days., Disp: 90 Tablet, Rfl: 0     "meloxicam (MOBIC) 15 MG tablet, Take 1 Tablet by mouth 1 time a day as needed for pain, Disp: 30 Tablet, Rfl: 3    [START ON 12/23/2024] methylphenidate (RITALIN) 20 MG tablet, Take 1 Tablet by mouth 3 times a day for 30 days., Disp: 90 Tablet, Rfl: 0    gabapentin (NEURONTIN) 300 MG Cap, Take 300 mg by mouth 3 times a day., Disp: , Rfl:     buprenorphine (SUBUTEX) 8 MG SL Tab, Take 8 mg by mouth 2 times a day., Disp: , Rfl:   ALLERGIES:   Allergies   Allergen Reactions    Kdc:Yellow Dye+Ci Pigment Blue 63+Metoclopramide      Involuntary motor tick    Metoclopramide Unspecified     IV Psychiatric response- / \" psychotic break\" report was given in ED and experienced severe irrational thinking.      SURGHX:   Past Surgical History:   Procedure Laterality Date    PEDRITO BY LAPAROSCOPY  7/7/2021    Procedure: CHOLECYSTECTOMY, LAPAROSCOPIC;  Surgeon: Harper Carlisle M.D.;  Location: SURGERY [facility];  Service: Gen Robotic    IN LAP CASTRO RESTRICT PROC LONGITUDINAL GAS* N/A 3/15/2021    Procedure: GASTRECTOMY, SLEEVE, LAPAROSCOPIC.;  Surgeon: John H Ganser, M.D.;  Location: [facility];  Service: General    HYSTERECTOMY LAPAROSCOPY N/A 11/21/2019    Procedure: HYSTERECTOMY, LAPAROSCOPIC;  Surgeon: Jericho Du M.D.;  Location: SURGERY SAME DAY [facility];  Service: Gynecology    SALPINGECTOMY Bilateral 11/21/2019    Procedure: SALPINGECTOMY;  Surgeon: Jericho Du M.D.;  Location: SURGERY SAME DAY [facility];  Service: Gynecology    CYSTOSCOPY N/A 11/21/2019    Procedure: CYSTOSCOPY;  Surgeon: Jericho Du M.D.;  Location: SURGERY SAME DAY [facility];  Service: Gynecology    HYSTEROSCOPY THERMAL ABLATION N/A 7/10/2017    Procedure: HYSTEROSCOPY THERMAL ABLATION endometrial ;  Surgeon: Jericho Du M.D.;  Location: SURGERY SAME DAY [facility];  Service:     TUBAL COAGULATION LAPAROSCOPIC BILATERAL Bilateral 7/10/2017    Procedure: TUBAL COAGULATION LAPAROSCOPIC BILATERAL;  Surgeon: " "Jericho Fields M.D.;  Location: SURGERY SAME DAY [facility];  Service:     DILATION AND CURETTAGE N/A 7/10/2017    Procedure: DILATION AND CURETTAGE;  Surgeon: Jericho Fields M.D.;  Location: SURGERY SAME DAY [facility];  Service:     PRIMARY C SECTION  12/19/2010    Performed by JERICHO FIELDS at LABOR AND DELIVERY    OTHER       T & A  age 14      SOCHX:  reports that she quit smoking about 17 years ago. Her smoking use included cigarettes. She has never used smokeless tobacco. She reports that she does not drink alcohol and does not use drugs.  FH:   Family History   Problem Relation Age of Onset    Diabetes Mother     Hyperlipidemia Mother     Pulmonary Embolism Mother     No Known Problems Father     Colorectal Cancer Maternal Aunt     Breast Cancer Maternal Aunt     Ovarian Cancer Maternal Aunt     Cancer Maternal Aunt     Tubal Cancer Neg Hx     Peritoneal Cancer Neg Hx      Review of Systems   Constitutional:  Negative for fever.   HENT:  Negative for sore throat.    Respiratory:  Negative for cough.    Gastrointestinal:  Positive for nausea. Negative for vomiting.   Genitourinary:  Positive for dysuria, flank pain and frequency.   Musculoskeletal:  Negative for myalgias.   Skin:  Negative for rash.        Objective:   /74 (BP Location: Right arm, Patient Position: Sitting, BP Cuff Size: Adult long)   Pulse 80   Temp 35.9 °C (96.6 °F) (Temporal)   Resp 17   Ht 1.626 m (5' 4\")   LMP 11/20/2019   SpO2 98%   BMI 29.63 kg/m²   Physical Exam  Vitals and nursing note reviewed.   Constitutional:       General: She is not in acute distress.     Appearance: She is well-developed.   HENT:      Head: Normocephalic and atraumatic.      Right Ear: External ear normal.      Left Ear: External ear normal.      Nose: Nose normal.      Mouth/Throat:      Mouth: Mucous membranes are moist.   Eyes:      Conjunctiva/sclera: Conjunctivae normal.   Cardiovascular:      Rate and Rhythm: Normal rate. "   Pulmonary:      Effort: Pulmonary effort is normal. No respiratory distress.      Breath sounds: Normal breath sounds.   Abdominal:      General: There is no distension.      Tenderness: There is abdominal tenderness in the suprapubic area. There is right CVA tenderness (Mild). There is no left CVA tenderness or guarding. Negative signs include Aparicio's sign and McBurney's sign.   Musculoskeletal:         General: Normal range of motion.   Skin:     General: Skin is warm and dry.   Neurological:      General: No focal deficit present.      Mental Status: She is alert and oriented to person, place, and time. Mental status is at baseline.      Gait: Gait (gait at baseline) normal.   Psychiatric:         Judgment: Judgment normal.               Assessment/Plan:   1. Acute cystitis with hematuria  - POCT Urinalysis  - Urine Culture; Future  - cefdinir (OMNICEF) 300 MG Cap; Take 1 Capsule by mouth every 12 hours for 7 days.  Dispense: 14 Capsule; Refill: 0    2. Flank pain  - POCT Urinalysis  - Urine Culture; Future  - cefdinir (OMNICEF) 300 MG Cap; Take 1 Capsule by mouth every 12 hours for 7 days.  Dispense: 14 Capsule; Refill: 0    3. Recurrent UTI  - POCT Urinalysis  - Urine Culture; Future  - cefdinir (OMNICEF) 300 MG Cap; Take 1 Capsule by mouth every 12 hours for 7 days.  Dispense: 14 Capsule; Refill: 0    4. Dysuria  - POCT Urinalysis  - Urine Culture; Future  - cefdinir (OMNICEF) 300 MG Cap; Take 1 Capsule by mouth every 12 hours for 7 days.  Dispense: 14 Capsule; Refill: 0  - phenazopyridine (PYRIDIUM) 200 MG Tab; Take 1 Tablet by mouth 3 times a day for 2 days.  Dispense: 6 Tablet; Refill: 0    5. Nausea  - ondansetron (ZOFRAN ODT) 4 MG TABLET DISPERSIBLE; Take 1 Tablet by mouth every 6 hours as needed for Nausea/Vomiting.  Dispense: 10 Tablet; Refill: 0    Medical decision-making/course: The patient remained afebrile, hemodynamically and neurologically stable with no evidence of respiratory compromise  throughout the urgent care course.  There was no immediate clinical indication for the necessity of emergency department evaluation or inpatient admission and the patient was amendable to a trial of outpatient management.        In the course of preparing for this visit with review of the pertinent past medical history, recent and past clinic visits including previous urine culture and sensitivities, current medications, and performing chart, immunization, medical history and medication reconciliation, and in the further course of obtaining the current history pertinent to the clinic visit today, performing an exam and evaluation, ordering and independently evaluating labs, tests including point-of-care urinalysis and urine culture, and/or procedures, prescribing any recommended new medications as noted above, providing any pertinent counseling and education and recommending further coordination of care and including recommendations for symptomatic and supportive measures, at least  23 minutes of total time were spent during this encounter.      Discussed close monitoring, return precautions, and supportive measures of maintaining adequate fluid hydration and caloric intake, relative rest and symptom management as needed for pain and/or fever.    Differential diagnosis, natural history, supportive care, and indications for immediate follow-up discussed.     Advised the patient to follow-up with the primary care physician for recheck, reevaluation, and consideration of further management.    Please note that this dictation was created using voice recognition software. I have worked with consultants from the vendor as well as technical experts from [facility] [employer] to optimize the interface. I have made every reasonable attempt to correct obvious errors, but I expect that there are errors of grammar and possibly content that I did not discover before finalizing the note.

## 2025-02-06 ENCOUNTER — TELEMEDICINE (OUTPATIENT)
Dept: MEDICAL GROUP | Facility: MEDICAL CENTER | Age: 40
End: 2025-02-06
Payer: COMMERCIAL

## 2025-02-06 VITALS — WEIGHT: 157 LBS | BODY MASS INDEX: 26.8 KG/M2 | HEIGHT: 64 IN

## 2025-02-06 DIAGNOSIS — M79.7 FIBROMYALGIA: ICD-10-CM

## 2025-02-06 DIAGNOSIS — F90.0 ATTENTION DEFICIT HYPERACTIVITY DISORDER, PREDOMINANTLY INATTENTIVE TYPE: ICD-10-CM

## 2025-02-06 DIAGNOSIS — Z79.891 CHRONIC PRESCRIPTION OPIATE USE: ICD-10-CM

## 2025-02-06 DIAGNOSIS — G89.4 CHRONIC PAIN SYNDROME: ICD-10-CM

## 2025-02-06 PROCEDURE — 99214 OFFICE O/P EST MOD 30 MIN: CPT | Mod: 95 | Performed by: STUDENT IN AN ORGANIZED HEALTH CARE EDUCATION/TRAINING PROGRAM

## 2025-02-06 RX ORDER — METHYLPHENIDATE HYDROCHLORIDE 20 MG/1
20 TABLET ORAL 3 TIMES DAILY
COMMUNITY
Start: 2025-01-27 | End: 2025-02-06 | Stop reason: SDUPTHER

## 2025-02-06 RX ORDER — METHYLPHENIDATE HYDROCHLORIDE 20 MG/1
20 TABLET ORAL 3 TIMES DAILY
Qty: 90 TABLET | Refills: 0 | Status: SHIPPED | OUTPATIENT
Start: 2025-03-29 | End: 2025-04-28

## 2025-02-06 RX ORDER — METHYLPHENIDATE HYDROCHLORIDE 20 MG/1
20 TABLET ORAL 3 TIMES DAILY
Qty: 90 TABLET | Refills: 0 | Status: SHIPPED | OUTPATIENT
Start: 2025-02-27 | End: 2025-03-29

## 2025-02-06 RX ORDER — METHYLPHENIDATE HYDROCHLORIDE 20 MG/1
20 TABLET ORAL 3 TIMES DAILY
Qty: 90 TABLET | Refills: 0 | Status: SHIPPED | OUTPATIENT
Start: 2025-04-28 | End: 2025-05-28

## 2025-02-06 ASSESSMENT — PATIENT HEALTH QUESTIONNAIRE - PHQ9: CLINICAL INTERPRETATION OF PHQ2 SCORE: 0

## 2025-02-06 ASSESSMENT — FIBROSIS 4 INDEX: FIB4 SCORE: 1.186775553334230738

## 2025-02-06 NOTE — LETTER
[facility]  Ayo Snyder M.D.  [address]  [facility] [address]  Fax: [phone]   Authorization for Release/Disclosure of   Protected Health Information   Name: LIZZY RIVAS : 1985 SSN: [id number]   Address: [address] [place] [id number] Phone:    [phone] (home)    I authorize the entity listed below to release/disclose the PHI below to:   [facility]/Ayo Snyder M.D. and Ayo Snyder M.D.   Provider or Entity Name:  [facility]   Address   City, State, [place]   Phone:      Fax:     Reason for request: continuity of care   Information to be released:    [  ] LAST COLONOSCOPY,  including any PATH REPORT and follow-up  [  ] LAST FIT/COLOGUARD RESULT [  ] LAST DEXA  [  ] LAST MAMMOGRAM  [  ] LAST PAP  [  ] LAST LABS [  ] RETINA EXAM REPORT  [  ] IMMUNIZATION RECORDS  [  ] Release all info  Xxx last note  xxxUDS      [  ] Check here and initial the line next to each item to release ALL health information INCLUDING  _____ Care and treatment for drug and / or alcohol abuse  _____ HIV testing, infection status, or AIDS  _____ Genetic Testing    DATES OF SERVICE OR TIME PERIOD TO BE DISCLOSED: _____________  I understand and acknowledge that:  * This Authorization may be revoked at any time by you in writing, except if your health information has already been used or disclosed.  * Your health information that will be used or disclosed as a result of you signing this authorization could be re-disclosed by the recipient. If this occurs, your re-disclosed health information may no longer be protected by State or Federal laws.  * You may refuse to sign this Authorization. Your refusal will not affect your ability to obtain treatment.  * This Authorization becomes effective upon signing and will  on (date) __________.      If no date is indicated, this Authorization will  one (1) year from the signature date.    Name: Lizzy Rivas  Signature:  Date:   2/6/2025     PLEASE FAX REQUESTED RECORDS BACK TO: [phone]
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
## 2025-03-04 ENCOUNTER — APPOINTMENT (OUTPATIENT)
Dept: MEDICAL GROUP | Facility: MEDICAL CENTER | Age: 40
End: 2025-03-04
Payer: COMMERCIAL

## 2025-03-04 ENCOUNTER — PATIENT MESSAGE (OUTPATIENT)
Dept: MEDICAL GROUP | Facility: MEDICAL CENTER | Age: 40
End: 2025-03-04

## 2025-03-04 DIAGNOSIS — M79.7 FIBROMYALGIA: ICD-10-CM

## 2025-03-04 DIAGNOSIS — Z79.891 CHRONIC USE OF OPIATE DRUG FOR THERAPEUTIC PURPOSE: ICD-10-CM

## 2025-03-04 DIAGNOSIS — G89.4 CHRONIC PAIN SYNDROME: ICD-10-CM

## 2025-03-04 RX ORDER — BUPRENORPHINE 8 MG/1
8 TABLET SUBLINGUAL 2 TIMES DAILY
Qty: 60 TABLET | Status: CANCELLED | OUTPATIENT
Start: 2025-03-04

## 2025-03-04 RX ORDER — GABAPENTIN 300 MG/1
300 CAPSULE ORAL 3 TIMES DAILY
Qty: 90 CAPSULE | Status: CANCELLED | OUTPATIENT
Start: 2025-03-04

## 2025-03-04 NOTE — TELEPHONE ENCOUNTER
VOICEMAIL  1. Caller Name: Yasemin                          Call Back Number: [phone]    2. Message: Patient is urgently asking for a refill on these two medications.     3. Patient approves office to leave a detailed voicemail/MyChart message: yes
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
## 2025-03-27 ENCOUNTER — HOSPITAL ENCOUNTER (EMERGENCY)
Facility: MEDICAL CENTER | Age: 40
End: 2025-03-27
Attending: EMERGENCY MEDICINE
Payer: COMMERCIAL

## 2025-03-27 VITALS
BODY MASS INDEX: 31.2 KG/M2 | OXYGEN SATURATION: 96 % | HEART RATE: 81 BPM | TEMPERATURE: 98.7 F | WEIGHT: 182.76 LBS | HEIGHT: 64 IN | RESPIRATION RATE: 16 BRPM | SYSTOLIC BLOOD PRESSURE: 112 MMHG | DIASTOLIC BLOOD PRESSURE: 76 MMHG

## 2025-03-27 DIAGNOSIS — Z76.0 MEDICATION REFILL: ICD-10-CM

## 2025-03-27 DIAGNOSIS — F11.20 BUPRENORPHINE DEPENDENCE (HCC): ICD-10-CM

## 2025-03-27 PROCEDURE — 700102 HCHG RX REV CODE 250 W/ 637 OVERRIDE(OP): Performed by: EMERGENCY MEDICINE

## 2025-03-27 PROCEDURE — A9270 NON-COVERED ITEM OR SERVICE: HCPCS | Performed by: EMERGENCY MEDICINE

## 2025-03-27 PROCEDURE — 99283 EMERGENCY DEPT VISIT LOW MDM: CPT

## 2025-03-27 RX ORDER — BUPRENORPHINE 20 UG/H
1 PATCH TRANSDERMAL ONCE
Status: DISCONTINUED | OUTPATIENT
Start: 2025-03-27 | End: 2025-03-27 | Stop reason: HOSPADM

## 2025-03-27 RX ORDER — BUPRENORPHINE 8 MG/1
8 TABLET SUBLINGUAL ONCE
Status: COMPLETED | OUTPATIENT
Start: 2025-03-27 | End: 2025-03-27

## 2025-03-27 RX ADMIN — BUPRENORPHINE 1 PATCH: 20 PATCH, EXTENDED RELEASE TRANSDERMAL at 16:58

## 2025-03-27 RX ADMIN — BUPRENORPHINE HCL 8 MG: 8 TABLET SUBLINGUAL at 16:58

## 2025-03-27 ASSESSMENT — FIBROSIS 4 INDEX: FIB4 SCORE: 1.186775553334230738
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
## 2025-03-27 NOTE — DISCHARGE INSTRUCTIONS
"As we discussed, one of our doctors here today helps run [facility], which is listed above, and although the name of the clinic includes \"[facility],\" the addresses [address] [place] [address].  They would be happy to take you on as a patient.  They recommend that you come tomorrow at 8 AM.    Additional options for medication assisted treatment include [facility], [facility] urgent care where you have already made contact, and the [facility] medication assisted treatment clinic, using the handout we gave you.  "
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
## 2025-04-01 ENCOUNTER — APPOINTMENT (OUTPATIENT)
Dept: MEDICAL GROUP | Facility: MEDICAL CENTER | Age: 40
End: 2025-04-01
Payer: COMMERCIAL

## 2025-05-30 ENCOUNTER — OFFICE VISIT (OUTPATIENT)
Dept: MEDICAL GROUP | Facility: MEDICAL CENTER | Age: 40
End: 2025-05-30
Payer: COMMERCIAL

## 2025-05-30 VITALS
SYSTOLIC BLOOD PRESSURE: 96 MMHG | HEART RATE: 78 BPM | WEIGHT: 192.4 LBS | DIASTOLIC BLOOD PRESSURE: 62 MMHG | BODY MASS INDEX: 32.85 KG/M2 | TEMPERATURE: 97.8 F | HEIGHT: 64 IN | OXYGEN SATURATION: 98 %

## 2025-05-30 DIAGNOSIS — F90.0 ATTENTION DEFICIT HYPERACTIVITY DISORDER, PREDOMINANTLY INATTENTIVE TYPE: ICD-10-CM

## 2025-05-30 RX ORDER — METHYLPHENIDATE HYDROCHLORIDE 20 MG/1
20 TABLET ORAL 3 TIMES DAILY
Qty: 90 TABLET | Refills: 0 | Status: SHIPPED | OUTPATIENT
Start: 2025-05-30 | End: 2025-06-29

## 2025-05-30 ASSESSMENT — FIBROSIS 4 INDEX: FIB4 SCORE: 1.186775553334230738

## 2025-05-30 NOTE — PROGRESS NOTES
"Chief Complaint   Patient presents with    Medication Refill    ADHD       Subjective:     HPI:   Yasemin Talavera presents today with the followin. Attention deficit hyperactivity disorder, predominantly inattentive type  This was just a medication renewal today rather than an annual wellness.  She takes medication for significant ADHD with significant task completion and organization issues without the medication.  Many years ago she was on Vyvanse which was very helpful but the cost and insurance issues around the medication have made it too difficult to use.  The short acting methylphenidate is no longer working well.  After discussion we agreed we will  change to long acting to see if that would work better.  However, she needs to  her medication at the pharmacy today within the next hour or so so we will continue the short acting methylphenidate for this month and changed to the long-acting methylphenidate for  and July.  PDMP review shows no inconsistencies.  Controlled substance treatment agreement signed and on file.      Patient Active Problem List    Diagnosis Date Noted    Chronic pain syndrome 2025    Chronic prescription opiate use 2025    Vaping nicotine dependence, non-tobacco product 10/24/2024    Attention deficit hyperactivity disorder, predominantly inattentive type 2024    Neurological symptoms 10/09/2023    Fibromyalgia 10/09/2023    Cyst of left ovary 2022    Status post laparoscopic cholecystectomy 2021    S/P gastric sleeve procedure 2021       Current medicines (including changes today)  Current Medications[1]    Allergies[2]    ROS: As per HPI       Objective:     BP 96/62 (BP Location: Left arm, Patient Position: Sitting, BP Cuff Size: Adult)   Pulse 78   Temp 36.6 °C (97.8 °F) (Temporal)   Ht 1.626 m (5' 4\")   Wt 87.3 kg (192 lb 6.4 oz)   SpO2 98%  Body mass index is 33.03 kg/m².    Physical Exam:  Constitutional: " Well-developed and well-nourished. Not diaphoretic. No distress. Lucid and fluent.  Skin: Skin is warm and dry. No rash noted.  Head: Atraumatic without lesions.  Eyes: Conjunctivae and extraocular motions are normal. Pupils are equal, round, and reactive to light. No scleral icterus.   Ears:  External ears unremarkable.   Neck: Supple, trachea midline. No thyromegaly present. No cervical or supraclavicular lymphadenopathy. No JVD or carotid bruits appreciated  Cardiovascular: Regular rate and rhythm.  Normal S1, S2 without murmur appreciated.  Chest: Effort normal. Clear to auscultation throughout. No adventitious sounds.   Extremities: No cyanosis, clubbing, erythema, nor edema.   Neurological: Alert and oriented x 3. No tremor noted.  Psychiatric:  Behavior, mood, and affect are appropriate.       Assessment and Plan:     39 y.o. female with the following issues:    1. Attention deficit hyperactivity disorder, predominantly inattentive type  methylphenidate (RITALIN) 20 MG tablet    Methylphenidate HCl ER, PM, 60 MG CAPSULE SR 24 HR    Methylphenidate HCl ER, PM, 60 MG CAPSULE SR 24 HR            Followup: Return in about 3 months (around 8/30/2025), or if symptoms worsen or fail to improve.         [1]   Current Outpatient Medications   Medication Sig Dispense Refill    methylphenidate (RITALIN) 20 MG tablet Take 1 Tablet by mouth 3 times a day for 30 days. 90 tablets is a 30 day quantity. 90 Tablet 0    [START ON 6/29/2025] Methylphenidate HCl ER, PM, 60 MG CAPSULE SR 24 HR Take 1 Capsule by mouth every day for 30 days. 30 tablets is a 30 day quantity.  ICD-10 code F90.0 30 Capsule 0    [START ON 7/29/2025] Methylphenidate HCl ER, PM, 60 MG CAPSULE SR 24 HR Take 1 Capsule by mouth every day for 30 days. 30 tablets is a 30 day quantity.  ICD-10 code F90.0 30 Capsule 0    ondansetron (ZOFRAN ODT) 4 MG TABLET DISPERSIBLE Take 1 Tablet by mouth every 6 hours as needed for Nausea/Vomiting. 10 Tablet 0    gabapentin  "(NEURONTIN) 300 MG Cap Take 300 mg by mouth 3 times a day.      buprenorphine (SUBUTEX) 8 MG SL Tab Take 8 mg by mouth 2 times a day.       No current facility-administered medications for this visit.   [2]   Allergies  Allergen Reactions    Kdc:Yellow Dye+Ci Pigment Blue 63+Metoclopramide      Involuntary motor tick    Metoclopramide Unspecified     IV Psychiatric response- / \" psychotic break\" report was given in ED and experienced severe irrational thinking.      "

## 2025-06-24 ENCOUNTER — OFFICE VISIT (OUTPATIENT)
Dept: MEDICAL GROUP | Facility: MEDICAL CENTER | Age: 40
End: 2025-06-24
Payer: COMMERCIAL

## 2025-06-24 ENCOUNTER — PHARMACY VISIT (OUTPATIENT)
Dept: PHARMACY | Facility: MEDICAL CENTER | Age: 40
End: 2025-06-24
Payer: COMMERCIAL

## 2025-06-24 VITALS
DIASTOLIC BLOOD PRESSURE: 82 MMHG | BODY MASS INDEX: 33.05 KG/M2 | WEIGHT: 193.6 LBS | OXYGEN SATURATION: 99 % | TEMPERATURE: 98.6 F | RESPIRATION RATE: 18 BRPM | HEIGHT: 64 IN | HEART RATE: 83 BPM | SYSTOLIC BLOOD PRESSURE: 102 MMHG

## 2025-06-24 DIAGNOSIS — M25.50 MULTIPLE JOINT PAIN: ICD-10-CM

## 2025-06-24 PROCEDURE — 3074F SYST BP LT 130 MM HG: CPT | Performed by: FAMILY MEDICINE

## 2025-06-24 PROCEDURE — RXMED WILLOW AMBULATORY MEDICATION CHARGE: Performed by: FAMILY MEDICINE

## 2025-06-24 PROCEDURE — 3079F DIAST BP 80-89 MM HG: CPT | Performed by: FAMILY MEDICINE

## 2025-06-24 PROCEDURE — 99213 OFFICE O/P EST LOW 20 MIN: CPT | Performed by: FAMILY MEDICINE

## 2025-06-24 PROCEDURE — RXOTC WILLOW AMBULATORY OTC CHARGE

## 2025-06-24 RX ORDER — INDOMETHACIN 25 MG/1
25 CAPSULE ORAL 2 TIMES DAILY PRN
Qty: 10 CAPSULE | Refills: 0 | Status: SHIPPED | OUTPATIENT
Start: 2025-06-24 | End: 2025-06-29

## 2025-06-24 RX ORDER — IBUPROFEN 600 MG/1
600 TABLET, FILM COATED ORAL EVERY 8 HOURS PRN
COMMUNITY
Start: 2025-04-28

## 2025-06-24 RX ORDER — OXYCODONE AND ACETAMINOPHEN 10; 325 MG/1; MG/1
1 TABLET ORAL EVERY 8 HOURS PRN
Qty: 30 TABLET | Refills: 0 | Status: SHIPPED | OUTPATIENT
Start: 2025-06-24 | End: 2025-07-04

## 2025-06-24 ASSESSMENT — FIBROSIS 4 INDEX: FIB4 SCORE: 1.186775553334230738

## 2025-06-24 NOTE — PROGRESS NOTES
"Chief Complaint   Patient presents with    Other     Patient states she has a really bad flare up of her MS. She used to get (Lemtrada) shot once a month and that would help. She does need something to help her with her pain.     Multiple Sclerosis    Arthritis       Subjective:     HPI:   Yasemin Talavera presents today with the followin. Multiple joint pain  This began saturday afternoon, flare of joint pain, deep bone pain also.  Is unremitting.  She is on buprenorphine.  Her pain management provider will not be in the office until later this week.  Was reached through the office and approves 10-14 days of short acting opioid.  Has UDS with her pain management team, consistent.  Controlled substance treatment agreement reviewed and signed today.  Her PDMP review shows no inconsistencies.  She will continue her buprenorphine but will not increase the dose.  Has a pain appointment in a month.      Patient Active Problem List    Diagnosis Date Noted    Chronic pain syndrome 2025    Chronic prescription opiate use 2025    Vaping nicotine dependence, non-tobacco product 10/24/2024    Attention deficit hyperactivity disorder, predominantly inattentive type 2024    Neurological symptoms 10/09/2023    Fibromyalgia 10/09/2023    Cyst of left ovary 2022    Status post laparoscopic cholecystectomy 2021    S/P gastric sleeve procedure 2021       Current medicines (including changes today)  Current Medications[1]    Allergies[2]    ROS: As per HPI  does have MS history.  Treated with TNF drug in the past.       Objective:     /82   Pulse 83   Temp 37 °C (98.6 °F) (Temporal)   Resp 18   Ht 1.626 m (5' 4\")   Wt 87.8 kg (193 lb 9.6 oz)   SpO2 99%  Body mass index is 33.23 kg/m².    Physical Exam:  Constitutional: Well-developed and well-nourished. Not diaphoretic. No distress. Lucid and fluent.  Skin: Skin is warm and dry. No rash noted.  Head: Atraumatic without " lesions.  Eyes: Conjunctivae and extraocular motions are normal. Pupils are equal, round, and reactive to light. No scleral icterus.   Ears:  External ears unremarkable.   Neck: Supple, trachea midline. No thyromegaly present. No cervical or supraclavicular lymphadenopathy. No JVD or carotid bruits appreciated  Cardiovascular: Regular rate and rhythm.  Normal S1, S2 without murmur appreciated.  Chest: Effort normal. Clear to auscultation throughout. No adventitious sounds.   Abdomen: Soft, non tender, and without distention. Active bowel sounds in all four quadrants. No rebound, guarding, masses or hepatosplenomegaly.  Extremities: No cyanosis, clubbing, erythema, nor edema.  Multiple joint tenderness and muscle tenderness.  No erythema or heat (patient feels heat) mild synovial thickening.  Wrists particularly tender.  Neurological: Alert and oriented x 3.   Psychiatric:  Behavior, mood, and affect are appropriate.       Assessment and Plan:     39 y.o. female with the following issues:    1. Multiple joint pain  indomethacin (INDOCIN) 25 MG Cap    oxyCODONE-acetaminophen (PERCOCET-10)  MG Tab    Controlled Substance Treatment Agreement            Followup: Return if symptoms worsen or fail to improve.       [1]   Current Outpatient Medications   Medication Sig Dispense Refill    ibuprofen (MOTRIN) 600 MG Tab Take 600 mg by mouth every 8 hours as needed.      indomethacin (INDOCIN) 25 MG Cap Take 1 Capsule by mouth 2 times a day as needed (for joint pain, take with food) for up to 5 days. 10 Capsule 0    oxyCODONE-acetaminophen (PERCOCET-10)  MG Tab Take 1 Tablet by mouth every 8 hours as needed for Severe Pain for up to 10 days. 30 Tablet 0    methylphenidate (RITALIN) 20 MG tablet Take 1 Tablet by mouth 3 times a day for 30 days. 90 tablets is a 30 day quantity. 90 Tablet 0    [START ON 6/29/2025] Methylphenidate HCl ER, PM, 60 MG CAPSULE SR 24 HR Take 1 Capsule by mouth every day for 30 days. 30  "tablets is a 30 day quantity.  ICD-10 code F90.0 30 Capsule 0    ondansetron (ZOFRAN ODT) 4 MG TABLET DISPERSIBLE Take 1 Tablet by mouth every 6 hours as needed for Nausea/Vomiting. 10 Tablet 0    gabapentin (NEURONTIN) 300 MG Cap Take 300 mg by mouth 3 times a day.      buprenorphine (SUBUTEX) 8 MG SL Tab Take 8 mg by mouth 2 times a day.      [START ON 7/29/2025] Methylphenidate HCl ER, PM, 60 MG CAPSULE SR 24 HR Take 1 Capsule by mouth every day for 30 days. 30 tablets is a 30 day quantity.  ICD-10 code F90.0 (Patient not taking: Reported on 6/24/2025) 30 Capsule 0     No current facility-administered medications for this visit.   [2]   Allergies  Allergen Reactions    Kdc:Yellow Dye+Ci Pigment Blue 63+Metoclopramide      Involuntary motor tick    Metoclopramide Unspecified     IV Psychiatric response- / \" psychotic break\" report was given in ED and experienced severe irrational thinking.      "

## 2025-07-13 ENCOUNTER — APPOINTMENT (OUTPATIENT)
Dept: URGENT CARE | Facility: PHYSICIAN GROUP | Age: 40
End: 2025-07-13
Payer: COMMERCIAL

## 2025-07-18 DIAGNOSIS — F90.0 ATTENTION DEFICIT HYPERACTIVITY DISORDER, PREDOMINANTLY INATTENTIVE TYPE: ICD-10-CM

## 2025-07-18 NOTE — TELEPHONE ENCOUNTER
Received request via: Pharmacy    Was the patient seen in the last year in this department? Yes    Does the patient have an active prescription (recently filled or refills available) for medication(s) requested? No    Pharmacy Name: [facility] - [place], [place] - [address]     Does the patient have half-way Plus and need 100-day supply? (This applies to ALL medications) Patient does not have SCP

## 2025-07-21 RX ORDER — METHYLPHENIDATE HYDROCHLORIDE 20 MG/1
TABLET ORAL
Qty: 90 TABLET | Refills: 0 | Status: SHIPPED | OUTPATIENT
Start: 2025-07-21 | End: 2025-08-20

## (undated) DEVICE — SODIUM CHL IRRIGATION 0.9% 1000ML (12EA/CA)

## (undated) DEVICE — NEPTUNE 4 PORT MANIFOLD - (20/PK)

## (undated) DEVICE — SLEEVE, VASO, THIGH, MED

## (undated) DEVICE — SPONGE GAUZESTER. 2X2 4-PL - (2/PK 50PK/BX 30BX/CS)

## (undated) DEVICE — LACTATED RINGERS INJ 1000 ML - (14EA/CA 60CA/PF)

## (undated) DEVICE — BANDAID SHEER STRIP 3/4 IN (100EA/BX 12BX/CA)

## (undated) DEVICE — DRESSING TRANSPARENT FILM TEGADERM 2.375 X 2.75"  (100EA/BX)"

## (undated) DEVICE — TOWEL STOP TIMEOUT SAFETY FLAG (40EA/CA)

## (undated) DEVICE — GOWN SURGEONS X-LARGE - DISP. (30/CA)

## (undated) DEVICE — SET EXTENSION WITH 2 PORTS (48EA/CA) ***PART #2C8610 IS A SUBSTITUTE*****

## (undated) DEVICE — MAT PATIENT POSITIONING PREVALON (10EA/CA)

## (undated) DEVICE — CHLORAPREP 26 ML APPLICATOR - ORANGE TINT(25/CA)

## (undated) DEVICE — UTERINE MANIP V-CARE STANDARD DAVINCI (8EA/CA)

## (undated) DEVICE — SET TUBING PNEUMOCLEAR HIGH FLOW SMOKE EVACUATION (10EA/BX)

## (undated) DEVICE — PACK LAPAROSCOPY - (1/CA)

## (undated) DEVICE — ELECTRODE DUAL RETURN W/ CORD - (50/PK)

## (undated) DEVICE — GOWN WARMING STANDARD FLEX - (30/CA)

## (undated) DEVICE — SUTURE 0 VICRYL PLUS UR-6 - 27 INCH (36/BX)

## (undated) DEVICE — MASK ANESTHESIA ADULT  - (100/CA)

## (undated) DEVICE — SUTURE 3-0 ENDO STITCH ABSORBALE 8 20CM (6EA/CA)"

## (undated) DEVICE — GLOVE BIOGEL PI INDICATOR SZ 6.5 SURGICAL PF LF - (50/BX 4BX/CA)

## (undated) DEVICE — PROTECTOR ULNA NERVE - (36PR/CA)

## (undated) DEVICE — CANISTER SUCTION RIGID RED 1500CC (40EA/CA)

## (undated) DEVICE — SUCTION INSTRUMENT YANKAUER BULBOUS TIP W/O VENT (50EA/CA)

## (undated) DEVICE — DRAPE VAGINAL BIB W/ POUCH (10EA/CA)

## (undated) DEVICE — ELECTRODE 850 FOAM ADHESIVE - HYDROGEL RADIOTRNSPRNT (50/PK)

## (undated) DEVICE — TROCAR LAPSCP 100MM 12MM NTHRD - (6/BX)

## (undated) DEVICE — TROCAR 5X100 NON BLADED Z-TH - READ KII (6/BX)

## (undated) DEVICE — SET SUCTION/IRRIGATION WITH DISPOSABLE TIP (6/CA )PART #0250-070-520 IS A SUB

## (undated) DEVICE — CATHETER IV 20 GA X 1-1/4 ---SURG.& SDS ONLY--- (50EA/BX)

## (undated) DEVICE — GLOVE SZ 7.5 BIOGEL PI MICRO - PF LF (50PR/BX)

## (undated) DEVICE — CANNULA W/SEAL 5X100 Z-THRE - ADED KII (12/BX)

## (undated) DEVICE — TUBE E-T HI-LO CUFF 7.0MM (10EA/PK)

## (undated) DEVICE — TUBING CLEARLINK DUO-VENT - C-FLO (48EA/CA)

## (undated) DEVICE — BAG RETRIEVAL 10ML (10EA/BX)

## (undated) DEVICE — HEAD HOLDER JUNIOR/ADULT

## (undated) DEVICE — MANIFOLD NEPTUNE 1 PORT (20/PK)

## (undated) DEVICE — GLOVE BIOGEL SZ 6.5 SURGICAL PF LTX (50PR/BX 4BX/CA)

## (undated) DEVICE — ARMREST CRADLE FOAM - (2PR/PK 12PR/CA)

## (undated) DEVICE — SUTURE 0 VICRYL PLUS CT-2 - 27 INCH (36/BX)

## (undated) DEVICE — TUBE CONNECTING SUCTION - CLEAR PLASTIC STERILE 72 IN (50EA/CA)

## (undated) DEVICE — SET IRRIGATION CYSTOSCOPY TUBE L80 IN (20EA/CA)

## (undated) DEVICE — TROCAR Z THREAD12MM OPTICAL - NON BLADED (6/BX)

## (undated) DEVICE — NEEDLE INSUFFLATION FOR STEP - (12/BX)

## (undated) DEVICE — BANDAID X-LARGE 2 X 4 IN LF (50EA/BX)

## (undated) DEVICE — LIGASURE VESSEL SEAL LAP 10MM - SINGLE USE (6EA/CA)

## (undated) DEVICE — PAD SANITARY 11IN MAXI IND WRAPPED  (12EA/PK 24PK/CA)

## (undated) DEVICE — SUTURE 4-0 VICRYL PLUS FS-2 - 27 INCH (36/BX)

## (undated) DEVICE — GLOVE BIOGEL SZ 7.5 SURGICAL PF LTX - (50PR/BX 4BX/CA)

## (undated) DEVICE — Device

## (undated) DEVICE — KIT  I.V. START (100EA/CA)

## (undated) DEVICE — GLOVE BIOGEL INDICATOR SZ 6.5 SURGICAL PF LTX - (50PR/BX 4BX/CA)

## (undated) DEVICE — SENSOR SPO2 NEO LNCS ADHESIVE (20/BX) SEE USER NOTES

## (undated) DEVICE — NEEDLE SPINAL NON-SAFETY 22 GA X 3 (25EA/BX)"

## (undated) DEVICE — SYRINGE 10 ML CONTROL LL (25EA/BX 4BX/CA)

## (undated) DEVICE — SUTURE 2-0 VICRYL PLUS CT-1 36 (36PK/BX)"

## (undated) DEVICE — DRESSING NON ADHERENT 3 X 4 - STERILE (100/BX 12BX/CA)

## (undated) DEVICE — SUTURE GENERAL

## (undated) DEVICE — TROCAR SEPARATOR 15MMZTHREAD - (6/BX)

## (undated) DEVICE — TUBING LAPAROSCOPIC PLUME DEVICE (10EA/CA)

## (undated) DEVICE — RELOAD WITH GRIPPING SURGACE TECHNOLOGY GREEN 60MM (12EA/BX)

## (undated) DEVICE — KIT ANESTHESIA W/CIRCUIT & 3/LT BAG W/FILTER (20EA/CA)

## (undated) DEVICE — NEEDLE INSFL 120MM 14GA VRRS - (20/BX)

## (undated) DEVICE — SUTURE 0 VICRYL PLUS CT-1 - 36 INCH (36/BX)

## (undated) DEVICE — SODIUM CHL. IRRIGATION 0.9% 3000ML (4EA/CA 65CA/PF)

## (undated) DEVICE — ENDOSTITCH10MM SUTURING DEVIC - (3/CA)

## (undated) DEVICE — DRAPESURG STERI-DRAPE LONG - (10/BX 4BX/CA)

## (undated) DEVICE — TROCAR STEP 11MM - (3/CA)

## (undated) DEVICE — GLOVE BIOGEL SZ 6 PF LATEX - (50EA/BX 4BX/CA)

## (undated) DEVICE — KIT HTA GENESYS - (5/BX)

## (undated) DEVICE — CANISTER SUCTION 3000ML MECHANICAL FILTER AUTO SHUTOFF MEDI-VAC NONSTERILE LF DISP  (40EA/CA)

## (undated) DEVICE — GLOVE BIOGEL INDICATOR SZ 7SURGICAL PF LTX - (50/BX 4BX/CA)

## (undated) DEVICE — TRAY SRGPRP PVP IOD WT PRP - (20/CA)

## (undated) DEVICE — DERMABOND ADVANCED - (12EA/BX)

## (undated) DEVICE — SEALER VESSEL HARMONIC ACE PLUS WITH ADVANCED HEMOSTASIS 36CM (1/EA)

## (undated) DEVICE — SET LEADWIRE 5 LEAD BEDSIDE DISPOSABLE ECG (1SET OF 5/EA)

## (undated) DEVICE — BAG SPONGE COUNT 10.25 X 32 - BLUE (250/CA)

## (undated) DEVICE — GLOVE BIOGEL M SZ 8 SURGICAL PF LTX - (50/BX 4BX/CA)

## (undated) DEVICE — UTERINE MANIP RUMI 6.7X8 - (5/BX)

## (undated) DEVICE — SUTURE 4-0 MONOCRYL PLUS PS-2 - 27 INCH (36/BX)

## (undated) DEVICE — WATER IRRIGATION STERILE 1000ML (12EA/CA)

## (undated) DEVICE — TUBING SETDISPOS HIGH FLOW II - (10/BX)

## (undated) DEVICE — TROCAR STEP 5MM - (3/CA)

## (undated) DEVICE — SCISSORS 5MM CVD (6EA/BX)

## (undated) DEVICE — GLOVE SZ 7 BIOGEL PI MICRO - PF LF (50PR/BX 4BX/CA)

## (undated) DEVICE — STAPLE 45MM BLUE 4.5MM (12EA/BX)

## (undated) DEVICE — TUBING INSUFFLATION PNEUMOCLEAR HIGH-FLOW (10EA/BX)

## (undated) DEVICE — HUMID-VENT HEAT AND MOISTURE EXCHANGE- (50/BX)

## (undated) DEVICE — GLOVE BIOGEL PI INDICATOR SZ 8.0 SURGICAL PF LF -(50/BX 4BX/CA)

## (undated) DEVICE — STAPLE 45MM VASCULAR WHITE 2.5MM (12EA/BX)

## (undated) DEVICE — ENDOSTITCH LOAD UNIT 0 SURGI - 12/CA

## (undated) DEVICE — GLOVE SZ 6.5 BIOGEL PI MICRO - PF LF (50PR/BX)

## (undated) DEVICE — RELOAD WITH GRIPPING SURFACE TECHNOLOGY GOLD 60MM (12EA/BX)

## (undated) DEVICE — TRAY FOLEY CATHETER STATLOCK 16FR SURESTEP  (10EA/CA)

## (undated) DEVICE — PACK GASTRIC BANDING OR - (1/CA)

## (undated) DEVICE — GLOVE BIOGEL PI INDICATOR SZ 7.5 SURGICAL PF LF -(50/BX 4BX/CA)

## (undated) DEVICE — BLADE SURGICAL #15 - (50/BX 3BX/CA)

## (undated) DEVICE — LEAD SET 6 DISP. EKG NIHON KOHDEN (100EA/CA)

## (undated) DEVICE — STAPLER POWERED 60MM (3EA/BX)

## (undated) DEVICE — PACK LAP CHOLE OR - (2EA/CA)